# Patient Record
Sex: FEMALE | Race: WHITE | NOT HISPANIC OR LATINO | Employment: OTHER | URBAN - METROPOLITAN AREA
[De-identification: names, ages, dates, MRNs, and addresses within clinical notes are randomized per-mention and may not be internally consistent; named-entity substitution may affect disease eponyms.]

---

## 2017-08-07 ENCOUNTER — APPOINTMENT (OUTPATIENT)
Dept: WOMENS IMAGING | Facility: HOSPITAL | Age: 72
End: 2017-08-07

## 2017-08-07 PROCEDURE — G0202 SCR MAMMO BI INCL CAD: HCPCS | Performed by: RADIOLOGY

## 2017-08-07 PROCEDURE — 77063 BREAST TOMOSYNTHESIS BI: CPT | Performed by: RADIOLOGY

## 2017-08-07 PROCEDURE — MDREVIEWSP: Performed by: RADIOLOGY

## 2018-08-21 ENCOUNTER — APPOINTMENT (OUTPATIENT)
Dept: WOMENS IMAGING | Facility: HOSPITAL | Age: 73
End: 2018-08-21

## 2018-08-21 PROCEDURE — 77063 BREAST TOMOSYNTHESIS BI: CPT | Performed by: RADIOLOGY

## 2018-08-21 PROCEDURE — MDREVIEWSP: Performed by: RADIOLOGY

## 2018-08-21 PROCEDURE — 77067 SCR MAMMO BI INCL CAD: CPT | Performed by: RADIOLOGY

## 2019-06-19 RX ORDER — ATORVASTATIN CALCIUM 10 MG/1
TABLET, FILM COATED ORAL
Qty: 90 TABLET | Refills: 0 | Status: SHIPPED | OUTPATIENT
Start: 2019-06-19 | End: 2019-09-16 | Stop reason: SDUPTHER

## 2019-07-25 ENCOUNTER — TRANSCRIBE ORDERS (OUTPATIENT)
Dept: INTERNAL MEDICINE | Facility: CLINIC | Age: 74
End: 2019-07-25

## 2019-09-16 RX ORDER — ATORVASTATIN CALCIUM 10 MG/1
TABLET, FILM COATED ORAL
Qty: 90 TABLET | Refills: 0 | Status: SHIPPED | OUTPATIENT
Start: 2019-09-16 | End: 2019-12-11 | Stop reason: SDUPTHER

## 2019-10-10 ENCOUNTER — APPOINTMENT (OUTPATIENT)
Dept: WOMENS IMAGING | Facility: HOSPITAL | Age: 74
End: 2019-10-10

## 2019-10-10 PROCEDURE — 77063 BREAST TOMOSYNTHESIS BI: CPT | Performed by: RADIOLOGY

## 2019-10-10 PROCEDURE — 77067 SCR MAMMO BI INCL CAD: CPT | Performed by: RADIOLOGY

## 2019-10-10 PROCEDURE — MDREVIEWSP: Performed by: RADIOLOGY

## 2019-10-15 ENCOUNTER — TELEPHONE (OUTPATIENT)
Dept: INTERNAL MEDICINE | Facility: CLINIC | Age: 74
End: 2019-10-15

## 2019-10-15 RX ORDER — OMEPRAZOLE 40 MG/1
40 CAPSULE, DELAYED RELEASE ORAL DAILY
Qty: 30 CAPSULE | Refills: 1 | Status: SHIPPED | OUTPATIENT
Start: 2019-10-15 | End: 2019-10-15 | Stop reason: SDUPTHER

## 2019-10-15 RX ORDER — OMEPRAZOLE 40 MG/1
40 CAPSULE, DELAYED RELEASE ORAL DAILY
Qty: 90 CAPSULE | Refills: 1 | Status: SHIPPED | OUTPATIENT
Start: 2019-10-15 | End: 2020-12-10

## 2019-10-15 NOTE — TELEPHONE ENCOUNTER
Patient states she has severe heartburn and acid reflux and has never had anything like this before.  She has been taking Tums, and pepto bismol without relief.    She will be going out of town for 10 days and she would like a prescription like Nexium or Prilosec that is stronger than something over the counter.

## 2019-10-25 PROBLEM — E55.9 VITAMIN D DEFICIENCY: Status: ACTIVE | Noted: 2019-10-25

## 2019-10-25 PROBLEM — K12.0 APHTHOUS ULCER: Status: ACTIVE | Noted: 2019-10-25

## 2019-10-25 PROBLEM — R01.1 HEART MURMUR: Status: ACTIVE | Noted: 2019-10-25

## 2019-10-25 PROBLEM — E03.9 HYPOTHYROIDISM: Status: ACTIVE | Noted: 2019-10-25

## 2019-10-25 PROBLEM — I10 BENIGN ESSENTIAL HYPERTENSION: Status: ACTIVE | Noted: 2019-10-25

## 2019-10-25 PROBLEM — Z85.828 HISTORY OF SQUAMOUS CELL CARCINOMA OF SKIN: Status: ACTIVE | Noted: 2019-10-25

## 2019-10-25 PROBLEM — I73.9 PERIPHERAL VASCULAR DISEASE: Status: ACTIVE | Noted: 2019-10-25

## 2019-10-25 PROBLEM — R05.9 COUGH: Status: ACTIVE | Noted: 2019-10-25

## 2019-10-25 PROBLEM — R73.01 IMPAIRED FASTING GLUCOSE: Status: ACTIVE | Noted: 2019-10-25

## 2019-10-25 PROBLEM — S90.422A: Status: ACTIVE | Noted: 2019-10-25

## 2019-10-25 PROBLEM — Z00.00 MEDICARE ANNUAL WELLNESS VISIT, SUBSEQUENT: Status: ACTIVE | Noted: 2019-10-25

## 2019-10-25 PROBLEM — N64.4 NIPPLE PAIN: Status: ACTIVE | Noted: 2019-10-25

## 2019-10-25 PROBLEM — E78.5 HYPERLIPIDEMIA LDL GOAL <100: Status: ACTIVE | Noted: 2019-10-25

## 2019-10-30 RX ORDER — LISINOPRIL AND HYDROCHLOROTHIAZIDE 20; 12.5 MG/1; MG/1
TABLET ORAL
Qty: 90 TABLET | Refills: 0 | OUTPATIENT
Start: 2019-10-30

## 2019-10-30 RX ORDER — LISINOPRIL AND HYDROCHLOROTHIAZIDE 20; 12.5 MG/1; MG/1
TABLET ORAL
Qty: 30 TABLET | Refills: 0 | Status: SHIPPED | OUTPATIENT
Start: 2019-10-30 | End: 2019-11-26 | Stop reason: SDUPTHER

## 2019-11-13 ENCOUNTER — TELEPHONE (OUTPATIENT)
Dept: INTERNAL MEDICINE | Facility: CLINIC | Age: 74
End: 2019-11-13

## 2019-11-13 DIAGNOSIS — R73.01 IMPAIRED FASTING GLUCOSE: ICD-10-CM

## 2019-11-13 DIAGNOSIS — E78.5 HYPERLIPIDEMIA LDL GOAL <100: ICD-10-CM

## 2019-11-13 DIAGNOSIS — I10 BENIGN ESSENTIAL HYPERTENSION: Primary | ICD-10-CM

## 2019-11-13 DIAGNOSIS — E55.9 VITAMIN D DEFICIENCY: ICD-10-CM

## 2019-11-13 NOTE — TELEPHONE ENCOUNTER
Topher from [facility] called stating that patient wanted to know if she needs to make an appt to get her labs done   I informed her that she doesn't the orders just have to be put in and I will ask Dr. Roman to put the orders in and to also tell patient someone will give her a call when they are in     Needs orders put in

## 2019-11-13 NOTE — TELEPHONE ENCOUNTER
PT CALLED FOLLOW UP WITH LAB ORDERS SHE NEEDS TO GO TOMORROW TO HAVE THEM DRAWN SHE WILL BE OUT OF TOWN fRIDAY     PLEASE ADVISE AND GIVE PT  CALL BACK

## 2019-11-13 NOTE — TELEPHONE ENCOUNTER
She does need appt for her medciare wellness with me before the end of the year and when that is made let me know and I will put in order for her labs to ge and s tell her where she needs to go. Since she will do her labs before visit she can have afternoon appt

## 2019-11-14 ENCOUNTER — LAB (OUTPATIENT)
Dept: LAB | Facility: HOSPITAL | Age: 74
End: 2019-11-14

## 2019-11-14 DIAGNOSIS — R73.01 IMPAIRED FASTING GLUCOSE: ICD-10-CM

## 2019-11-14 DIAGNOSIS — E78.5 HYPERLIPIDEMIA LDL GOAL <100: ICD-10-CM

## 2019-11-14 DIAGNOSIS — I10 BENIGN ESSENTIAL HYPERTENSION: ICD-10-CM

## 2019-11-14 DIAGNOSIS — E55.9 VITAMIN D DEFICIENCY: ICD-10-CM

## 2019-11-14 LAB
25(OH)D3 SERPL-MCNC: 21.5 NG/ML (ref 30–100)
ALBUMIN SERPL-MCNC: 4.5 G/DL (ref 3.5–5.2)
ALBUMIN UR-MCNC: <1.2 MG/DL
ALBUMIN/GLOB SERPL: 1.5 G/DL
ALP SERPL-CCNC: 70 U/L (ref 39–117)
ALT SERPL W P-5'-P-CCNC: 12 U/L (ref 1–33)
ANION GAP SERPL CALCULATED.3IONS-SCNC: 14.5 MMOL/L (ref 5–15)
AST SERPL-CCNC: 21 U/L (ref 1–32)
BASOPHILS # BLD AUTO: 0.02 10*3/MM3 (ref 0–0.2)
BASOPHILS NFR BLD AUTO: 0.3 % (ref 0–1.5)
BILIRUB SERPL-MCNC: 0.5 MG/DL (ref 0.2–1.2)
BUN BLD-MCNC: 12 MG/DL (ref 8–23)
BUN/CREAT SERPL: 13.2 (ref 7–25)
CALCIUM SPEC-SCNC: 9.9 MG/DL (ref 8.6–10.5)
CHLORIDE SERPL-SCNC: 96 MMOL/L (ref 98–107)
CHOLEST SERPL-MCNC: 177 MG/DL (ref 0–200)
CO2 SERPL-SCNC: 26.5 MMOL/L (ref 22–29)
CREAT BLD-MCNC: 0.91 MG/DL (ref 0.57–1)
CREAT UR-MCNC: 143.9 MG/DL
DEPRECATED RDW RBC AUTO: 41.8 FL (ref 37–54)
EOSINOPHIL # BLD AUTO: 0.29 10*3/MM3 (ref 0–0.4)
EOSINOPHIL NFR BLD AUTO: 4.4 % (ref 0.3–6.2)
ERYTHROCYTE [DISTWIDTH] IN BLOOD BY AUTOMATED COUNT: 11.8 % (ref 12.3–15.4)
GFR SERPL CREATININE-BSD FRML MDRD: 61 ML/MIN/1.73
GLOBULIN UR ELPH-MCNC: 3.1 GM/DL
GLUCOSE BLD-MCNC: 93 MG/DL (ref 65–99)
HBA1C MFR BLD: 5.4 % (ref 4.8–5.6)
HCT VFR BLD AUTO: 37.1 % (ref 34–46.6)
HDLC SERPL-MCNC: 73 MG/DL (ref 40–60)
HGB BLD-MCNC: 12.7 G/DL (ref 12–15.9)
IMM GRANULOCYTES # BLD AUTO: 0.03 10*3/MM3 (ref 0–0.05)
IMM GRANULOCYTES NFR BLD AUTO: 0.5 % (ref 0–0.5)
LDLC SERPL CALC-MCNC: 85 MG/DL (ref 0–100)
LDLC/HDLC SERPL: 1.17 {RATIO}
LYMPHOCYTES # BLD AUTO: 1.72 10*3/MM3 (ref 0.7–3.1)
LYMPHOCYTES NFR BLD AUTO: 25.8 % (ref 19.6–45.3)
MCH RBC QN AUTO: 33.2 PG (ref 26.6–33)
MCHC RBC AUTO-ENTMCNC: 34.2 G/DL (ref 31.5–35.7)
MCV RBC AUTO: 96.9 FL (ref 79–97)
MICROALBUMIN/CREAT UR: NORMAL MG/G{CREAT}
MONOCYTES # BLD AUTO: 0.88 10*3/MM3 (ref 0.1–0.9)
MONOCYTES NFR BLD AUTO: 13.2 % (ref 5–12)
NEUTROPHILS # BLD AUTO: 3.72 10*3/MM3 (ref 1.7–7)
NEUTROPHILS NFR BLD AUTO: 55.8 % (ref 42.7–76)
NRBC BLD AUTO-RTO: 0 /100 WBC (ref 0–0.2)
PLATELET # BLD AUTO: 318 10*3/MM3 (ref 140–450)
PMV BLD AUTO: 9.4 FL (ref 6–12)
POTASSIUM BLD-SCNC: 4.4 MMOL/L (ref 3.5–5.2)
PROT SERPL-MCNC: 7.6 G/DL (ref 6–8.5)
RBC # BLD AUTO: 3.83 10*6/MM3 (ref 3.77–5.28)
SODIUM BLD-SCNC: 137 MMOL/L (ref 136–145)
T4 FREE SERPL-MCNC: 1.08 NG/DL (ref 0.93–1.7)
TRIGL SERPL-MCNC: 94 MG/DL (ref 0–150)
TSH SERPL DL<=0.05 MIU/L-ACNC: 6.74 UIU/ML (ref 0.27–4.2)
VLDLC SERPL-MCNC: 18.8 MG/DL (ref 5–40)
WBC NRBC COR # BLD: 6.66 10*3/MM3 (ref 3.4–10.8)

## 2019-11-14 PROCEDURE — 84439 ASSAY OF FREE THYROXINE: CPT

## 2019-11-14 PROCEDURE — 85025 COMPLETE CBC W/AUTO DIFF WBC: CPT

## 2019-11-14 PROCEDURE — 82306 VITAMIN D 25 HYDROXY: CPT

## 2019-11-14 PROCEDURE — 82043 UR ALBUMIN QUANTITATIVE: CPT

## 2019-11-14 PROCEDURE — 84443 ASSAY THYROID STIM HORMONE: CPT

## 2019-11-14 PROCEDURE — 80053 COMPREHEN METABOLIC PANEL: CPT

## 2019-11-14 PROCEDURE — 80061 LIPID PANEL: CPT

## 2019-11-14 PROCEDURE — 82570 ASSAY OF URINE CREATININE: CPT

## 2019-11-14 PROCEDURE — 83036 HEMOGLOBIN GLYCOSYLATED A1C: CPT

## 2019-11-19 ENCOUNTER — OFFICE VISIT (OUTPATIENT)
Dept: INTERNAL MEDICINE | Facility: CLINIC | Age: 74
End: 2019-11-19

## 2019-11-19 VITALS
DIASTOLIC BLOOD PRESSURE: 60 MMHG | HEIGHT: 68 IN | SYSTOLIC BLOOD PRESSURE: 122 MMHG | BODY MASS INDEX: 21.52 KG/M2 | WEIGHT: 142 LBS | HEART RATE: 80 BPM

## 2019-11-19 DIAGNOSIS — I10 BENIGN ESSENTIAL HYPERTENSION: ICD-10-CM

## 2019-11-19 DIAGNOSIS — R73.01 IMPAIRED FASTING GLUCOSE: ICD-10-CM

## 2019-11-19 DIAGNOSIS — E78.5 HYPERLIPIDEMIA LDL GOAL <100: ICD-10-CM

## 2019-11-19 DIAGNOSIS — I73.9 PERIPHERAL VASCULAR DISEASE (HCC): ICD-10-CM

## 2019-11-19 DIAGNOSIS — R42 DIZZY: ICD-10-CM

## 2019-11-19 DIAGNOSIS — Z23 NEED FOR VACCINATION: ICD-10-CM

## 2019-11-19 DIAGNOSIS — Z85.828 HISTORY OF SQUAMOUS CELL CARCINOMA OF SKIN: ICD-10-CM

## 2019-11-19 DIAGNOSIS — E55.9 VITAMIN D DEFICIENCY: ICD-10-CM

## 2019-11-19 DIAGNOSIS — L50.9 HIVES: ICD-10-CM

## 2019-11-19 DIAGNOSIS — Z00.00 MEDICARE ANNUAL WELLNESS VISIT, SUBSEQUENT: Primary | ICD-10-CM

## 2019-11-19 PROCEDURE — 93000 ELECTROCARDIOGRAM COMPLETE: CPT | Performed by: INTERNAL MEDICINE

## 2019-11-19 PROCEDURE — G0439 PPPS, SUBSEQ VISIT: HCPCS | Performed by: INTERNAL MEDICINE

## 2019-11-19 PROCEDURE — 99213 OFFICE O/P EST LOW 20 MIN: CPT | Performed by: INTERNAL MEDICINE

## 2019-11-19 RX ORDER — CETIRIZINE HYDROCHLORIDE 5 MG/1
5 TABLET ORAL DAILY
Qty: 30 TABLET | Refills: 11 | Status: SHIPPED | OUTPATIENT
Start: 2019-11-19 | End: 2020-12-10

## 2019-11-19 NOTE — PROGRESS NOTES
The ABCs of the Annual Wellness Visit  Subsequent Medicare Wellness Visit    Chief Complaint   Patient presents with   • Medicare Wellness-subsequent     Labs are done   • Hypertension   • Hyperlipidemia     HIVES and dizziness  Subjective   History of Present Illness:  Sandy Brownlee is a 73 y.o. female who presents for a Subsequent Medicare Wellness Visit.She has good hearing and declined hearing evaluation. She has good vision overall. She denies chest pain and her breathing is ok. She has good get up and go. Her clock was good. Her memory is good and her mood is good. She enjoys time with family. I have gone over her medications. Her colonoscopy was 2017 and will repeat in 2022 with Dr Hatch. Her mammogram was ok last month. She has some mild dizziness at times but has not passed out. She has acute issue of hives and itching. It is relieved some with benadryl.     HEALTH RISK ASSESSMENT    Recent Hospitalizations:  No hospitalization(s) within the last year.    Current Medical Providers:  Patient Care Team:  Sylvester Roman MD as PCP - General (Internal Medicine)    Smoking Status:  Social History     Tobacco Use   Smoking Status Never Smoker   Smokeless Tobacco Never Used       Alcohol Consumption:  Social History     Substance and Sexual Activity   Alcohol Use Yes   • Frequency: 4 or more times a week   • Drinks per session: 1 or 2   • Binge frequency: Never       Depression Screen:   PHQ-2/PHQ-9 Depression Screening 11/19/2019   Little interest or pleasure in doing things 0   Feeling down, depressed, or hopeless 0   Total Score 0       Fall Risk Screen:  FARHEEN Fall Risk Assessment was completed, and patient is at LOW risk for falls.Assessment completed on:11/19/2019    Health Habits and Functional and Cognitive Screening:  Functional & Cognitive Status 11/19/2019   Do you have difficulty preparing food and eating? No   Do you have difficulty bathing yourself, getting dressed or grooming yourself? No    Do you have difficulty using the toilet? No   Do you have difficulty moving around from place to place? No   Do you have trouble with steps or getting out of a bed or a chair? No   Current Diet Well Balanced Diet   Dental Exam Up to date   Eye Exam Up to date   Exercise (times per week) 4 times per week   Current Exercise Activities Include Light Weight/Kettebells   Do you need help using the phone?  No   Are you deaf or do you have serious difficulty hearing?  No   Do you need help with transportation? No   Do you need help shopping? No   Do you need help preparing meals?  No   Do you need help with housework?  No   Do you need help with laundry? No   Do you need help taking your medications? No   Do you need help managing money? No   Do you ever drive or ride in a car without wearing a seat belt? No   Have you felt unusual stress, anger or loneliness in the last month? No   Who do you live with? Spouse   If you need help, do you have trouble finding someone available to you? No   Have you been bothered in the last four weeks by sexual problems? No   Do you have difficulty concentrating, remembering or making decisions? No         Does the patient have evidence of cognitive impairment? No    Asprin use counseling:Does not need ASA (and currently is not on it)    Age-appropriate Screening Schedule:  Refer to the list below for future screening recommendations based on patient's age, sex and/or medical conditions. Orders for these recommended tests are listed in the plan section. The patient has been provided with a written plan.    Health Maintenance   Topic Date Due   • ZOSTER VACCINE (3 of 3) 10/15/2019   • LIPID PANEL  11/14/2020   • TDAP/TD VACCINES (2 - Td) 03/22/2021   • MAMMOGRAM  10/10/2021   • COLONOSCOPY  08/30/2027   • INFLUENZA VACCINE  Completed   • PNEUMOCOCCAL VACCINES (65+ LOW/MEDIUM RISK)  Completed          The following portions of the patient's history were reviewed and updated as appropriate:  allergies, current medications, past family history, past medical history, past social history, past surgical history and problem list.    Outpatient Medications Prior to Visit   Medication Sig Dispense Refill   • atorvastatin (LIPITOR) 10 MG tablet TAKE 1 TABLET BY MOUTH AT BEDTIME 90 tablet 0   • lisinopril-hydrochlorothiazide (PRINZIDE,ZESTORETIC) 20-12.5 MG per tablet TAKE 1 TABLET BY MOUTH EVERY DAY 30 tablet 0   • omeprazole (priLOSEC) 40 MG capsule TAKE 1 CAPSULE BY MOUTH DAILY 90 capsule 1     No facility-administered medications prior to visit.        Patient Active Problem List   Diagnosis   • Blister of great toe of left foot, initial encounter   • Aphthous ulcer   • Benign essential hypertension   • Cough   • Heart murmur   • History of squamous cell carcinoma of skin   • Hyperlipidemia LDL goal <100   • Hypothyroidism   • Impaired fasting glucose   • Medicare annual wellness visit, subsequent   • Nipple pain   • Peripheral vascular disease (CMS/HCC)   • Vitamin D deficiency       Advanced Care Planning:  Patient does not have an advance directive - information provided to the patient today    Review of Systems   Constitutional: Negative for chills, fatigue and fever.   HENT: Negative for congestion, ear pain, hearing loss and sinus pressure.    Eyes: Negative for visual disturbance.   Respiratory: Negative for cough, chest tightness, shortness of breath and wheezing.    Cardiovascular: Negative for chest pain and palpitations.   Gastrointestinal: Negative for abdominal pain, blood in stool and constipation.   Endocrine: Negative for polyuria.   Genitourinary: Negative for dysuria.   Skin: Negative for color change.   Allergic/Immunologic: Positive for environmental allergies.   Neurological: Negative for dizziness, speech difficulty and headaches.   Hematological: Negative for adenopathy.   Psychiatric/Behavioral: Negative for confusion and dysphoric mood. The patient is not nervous/anxious.   "      Compared to one year ago, the patient feels her physical health is the same.  Compared to one year ago, the patient feels her mental health is the same.    Reviewed chart for potential of high risk medication in the elderly: yes  Reviewed chart for potential of harmful drug interactions in the elderly:yes    Objective         Vitals:    11/19/19 1551   BP: 122/60   BP Location: Left arm   Patient Position: Sitting   Cuff Size: Adult   Pulse: 80   Weight: 64.4 kg (142 lb)   Height: 171.5 cm (67.5\")   PainSc: 0-No pain       Body mass index is 21.91 kg/m².  Discussed the patient's BMI with her. The BMI is in the acceptable range.    Physical Exam   Constitutional: She is oriented to person, place, and time. She appears well-developed and well-nourished.   HENT:   Head: Normocephalic.   Right Ear: External ear normal.   Left Ear: External ear normal.   Mouth/Throat: Oropharynx is clear and moist.   Eyes: Conjunctivae and EOM are normal.   Neck: No JVD present.   Cardiovascular: Normal rate, regular rhythm and normal heart sounds.   No murmur heard.  Bilateral carotids ok   Pulmonary/Chest: Effort normal and breath sounds normal. No respiratory distress.   Abdominal: Soft. Bowel sounds are normal. There is no tenderness.   Musculoskeletal: She exhibits no edema.   Lymphadenopathy:     She has no cervical adenopathy.   Neurological: She is alert and oriented to person, place, and time.   Good get up and go and good recall 3 of 3 and good clock and low TUG   Skin: Skin is warm and dry.   Psychiatric: She has a normal mood and affect. Her behavior is normal.   Nursing note and vitals reviewed.      Lab Results   Component Value Date    TRIG 94 11/14/2019    HDL 73 (H) 11/14/2019    LDL 85 11/14/2019    VLDL 18.8 11/14/2019    HGBA1C 5.40 11/14/2019      ECG 12 Lead  Date/Time: 11/19/2019 5:00 PM  Performed by: Sylvester Roman MD  Authorized by: Sylvester Roman MD   Comparison: compared with previous ECG from " 1/2/2018  Similar to previous ECG  Rhythm: sinus rhythm  Rate: normal  QRS axis: normal    Clinical impression: normal ECG          Assessment/Plan   Medicare Risks and Personalized Health Plan  CMS Preventative Services Quick Reference  Advance Directive Discussion  Cardiovascular risk  Diabetic Lab Screening   Fall Risk  Glaucoma Risk  Hearing Problem  Polypharmacy    The above risks/problems have been discussed with the patient.  Pertinent information has been shared with the patient in the After Visit Summary.  Follow up plans and orders are seen below in the Assessment/Plan Section.    Diagnoses and all orders for this visit:    1. Medicare annual wellness visit, subsequent (Primary)  Assessment & Plan:  She has good hearing and declined hearing evaluation. She has good vision overall. She denies chest pain and her breathing is ok. She has good get up and go. Her clock was good. Her memory is good and her mood is good. She enjoys time with family. I have gone over her medications. Her colonoscopy was 2017 and will repeat in 2022 with Dr Hatch. Her mammogram was ok last month. She has some mild dizziness at times but has not passed out. She has acute issue of hives and itching. It is relieved some with benadryl. Age-appropriate Counseling:  Discussed preventative medicine issues with patient including regular exercise, healthy diet, stress reduction, adequate sleep and recommended age-appropriate screening studies.  Immunizations reviewed.           2. Benign essential hypertension  Assessment & Plan:  Hypertension is improving with treatment.  Continue current treatment regimen.  Dietary sodium restriction.  Regular aerobic exercise.  Blood pressure will be reassessed at the next regular appointment.    Orders:  -     ECG 12 Lead  -     Duplex Carotid Ultrasound CAR; Future    3. Hives  Comments:  Acute issue and will add zyrtec and see Dr Pavon.   Orders:  -     cetirizine (zyrTEC) 5 MG tablet; Take 1 tablet by  mouth Daily.  Dispense: 30 tablet; Refill: 11    4. Vitamin D deficiency  Assessment & Plan:  Under replaced. Add 1000 IU of vitamin D 3 daily.       5. Impaired fasting glucose  Assessment & Plan:  Discussed decreasing bad carbohydrates, specifically sweets, breads, potatoes, corn and high caloric drinks (juices, sodas, sweet tea).  Also recommend increasing physical activity, ideally 150 minutes aerobic exercise weekly and resistance exercises 2-3x/week.      6. Hyperlipidemia LDL goal <100  Assessment & Plan:  Lipid abnormalities are unchanged.  Nutritional counseling was provided. and Pharmacotherapy as ordered.  Lipids will be reassessed in 1 year.    Orders:  -     Duplex Carotid Ultrasound CAR; Future    7. Peripheral vascular disease (CMS/HCC)  Assessment & Plan:  She gets regular exercise and denies claudication and take atorvastatin and proceed with carotid ultrasound.     Orders:  -     Duplex Carotid Ultrasound CAR; Future    8. Dizzy  Comments:  Acute issue and will get carotid ultrasound.   Orders:  -     Duplex Carotid Ultrasound CAR; Future    9. Need for vaccination  -     Cancel: Pneumococcal Polysaccharide Vaccine 23-Valent Greater Than or Equal To 3yo Subcutaneous / IM    10. History of squamous cell carcinoma of skin  Assessment & Plan:  Follow with Dr Sirisha Partida yearly.       Follow Up:  Return in about 1 year (around 11/19/2020) for Medicare Wellness.     An After Visit Summary and PPPS were given to the patient.

## 2019-11-20 NOTE — ASSESSMENT & PLAN NOTE
Hypertension is improving with treatment.  Continue current treatment regimen.  Dietary sodium restriction.  Regular aerobic exercise.  Blood pressure will be reassessed at the next regular appointment.

## 2019-11-20 NOTE — ASSESSMENT & PLAN NOTE
She has good hearing and declined hearing evaluation. She has good vision overall. She denies chest pain and her breathing is ok. She has good get up and go. Her clock was good. Her memory is good and her mood is good. She enjoys time with family. I have gone over her medications. Her colonoscopy was 2017 and will repeat in 2022 with Dr Hatch. Her mammogram was ok last month. She has some mild dizziness at times but has not passed out. She has acute issue of hives and itching. It is relieved some with benadryl. Age-appropriate Counseling:  Discussed preventative medicine issues with patient including regular exercise, healthy diet, stress reduction, adequate sleep and recommended age-appropriate screening studies.  Immunizations reviewed.

## 2019-11-20 NOTE — ASSESSMENT & PLAN NOTE
She gets regular exercise and denies claudication and take atorvastatin and proceed with carotid ultrasound.

## 2019-11-26 ENCOUNTER — HOSPITAL ENCOUNTER (OUTPATIENT)
Dept: CARDIOLOGY | Facility: HOSPITAL | Age: 74
Discharge: HOME | End: 2019-11-26
Admitting: INTERNAL MEDICINE

## 2019-11-26 ENCOUNTER — OFFICE (OUTPATIENT)
Dept: URBAN - METROPOLITAN AREA CLINIC 4 | Facility: CLINIC | Age: 74
End: 2019-11-26
Payer: COMMERCIAL

## 2019-11-26 VITALS — WEIGHT: 142 LBS | BODY MASS INDEX: 22.29 KG/M2 | HEIGHT: 67 IN

## 2019-11-26 VITALS — SYSTOLIC BLOOD PRESSURE: 146 MMHG | WEIGHT: 141 LBS | DIASTOLIC BLOOD PRESSURE: 75 MMHG | HEIGHT: 69 IN

## 2019-11-26 DIAGNOSIS — I73.9 PERIPHERAL VASCULAR DISEASE (HCC): ICD-10-CM

## 2019-11-26 DIAGNOSIS — L28.2 OTHER PRURIGO: ICD-10-CM

## 2019-11-26 DIAGNOSIS — K21.9 GASTRO-ESOPHAGEAL REFLUX DISEASE WITHOUT ESOPHAGITIS: ICD-10-CM

## 2019-11-26 DIAGNOSIS — R10.31 RIGHT LOWER QUADRANT PAIN: ICD-10-CM

## 2019-11-26 DIAGNOSIS — R42 DIZZY: ICD-10-CM

## 2019-11-26 DIAGNOSIS — R19.4 CHANGE IN BOWEL HABIT: ICD-10-CM

## 2019-11-26 DIAGNOSIS — K59.00 CONSTIPATION, UNSPECIFIED: ICD-10-CM

## 2019-11-26 DIAGNOSIS — E78.5 HYPERLIPIDEMIA LDL GOAL <100: ICD-10-CM

## 2019-11-26 DIAGNOSIS — I10 BENIGN ESSENTIAL HYPERTENSION: ICD-10-CM

## 2019-11-26 LAB
BH CV ECHO MEAS - BSA(HAYCOCK): 1.7 M^2
BH CV ECHO MEAS - BSA: 1.7 M^2
BH CV ECHO MEAS - BZI_BMI: 22.2 KILOGRAMS/M^2
BH CV ECHO MEAS - BZI_METRIC_HEIGHT: 170.2 CM
BH CV ECHO MEAS - BZI_METRIC_WEIGHT: 64.4 KG
BH CV XLRA MEAS LEFT CCA RATIO VEL: 69.1 CM/SEC
BH CV XLRA MEAS LEFT DIST CCA EDV: 17.6 CM/SEC
BH CV XLRA MEAS LEFT DIST CCA PSV: 69.8 CM/SEC
BH CV XLRA MEAS LEFT DIST ICA EDV: 32.1 CM/SEC
BH CV XLRA MEAS LEFT DIST ICA PSV: 99.2 CM/SEC
BH CV XLRA MEAS LEFT ICA RATIO VEL: 73.9 CM/SEC
BH CV XLRA MEAS LEFT ICA/CCA RATIO: 1.1
BH CV XLRA MEAS LEFT MID CCA EDV: 15.7 CM/SEC
BH CV XLRA MEAS LEFT MID CCA PSV: 66.6 CM/SEC
BH CV XLRA MEAS LEFT MID ICA EDV: 24 CM/SEC
BH CV XLRA MEAS LEFT MID ICA PSV: 74.3 CM/SEC
BH CV XLRA MEAS LEFT PROX CCA EDV: 20.1 CM/SEC
BH CV XLRA MEAS LEFT PROX CCA PSV: 96.8 CM/SEC
BH CV XLRA MEAS LEFT PROX ECA PSV: 63.5 CM/SEC
BH CV XLRA MEAS LEFT PROX ICA EDV: 14.5 CM/SEC
BH CV XLRA MEAS LEFT PROX ICA PSV: 46.4 CM/SEC
BH CV XLRA MEAS LEFT PROX SCLA PSV: 204.3 CM/SEC
BH CV XLRA MEAS LEFT VERTEBRAL A EDV: 13.3 CM/SEC
BH CV XLRA MEAS LEFT VERTEBRAL A PSV: 61.5 CM/SEC
BH CV XLRA MEAS RIGHT CCA RATIO VEL: 65.4 CM/SEC
BH CV XLRA MEAS RIGHT DIST CCA EDV: 17.6 CM/SEC
BH CV XLRA MEAS RIGHT DIST CCA PSV: 66 CM/SEC
BH CV XLRA MEAS RIGHT DIST ICA EDV: 23.9 CM/SEC
BH CV XLRA MEAS RIGHT DIST ICA PSV: 80.5 CM/SEC
BH CV XLRA MEAS RIGHT ICA RATIO VEL: 61.6 CM/SEC
BH CV XLRA MEAS RIGHT ICA/CCA RATIO: 0.94
BH CV XLRA MEAS RIGHT MID CCA EDV: 21.2 CM/SEC
BH CV XLRA MEAS RIGHT MID CCA PSV: 80.1 CM/SEC
BH CV XLRA MEAS RIGHT MID ICA EDV: 23.9 CM/SEC
BH CV XLRA MEAS RIGHT MID ICA PSV: 62.2 CM/SEC
BH CV XLRA MEAS RIGHT PROX CCA EDV: 14.1 CM/SEC
BH CV XLRA MEAS RIGHT PROX CCA PSV: 88 CM/SEC
BH CV XLRA MEAS RIGHT PROX ECA PSV: 71 CM/SEC
BH CV XLRA MEAS RIGHT PROX ICA EDV: 19.5 CM/SEC
BH CV XLRA MEAS RIGHT PROX ICA PSV: 68.5 CM/SEC
BH CV XLRA MEAS RIGHT PROX SCLA PSV: 180.7 CM/SEC
BH CV XLRA MEAS RIGHT VERTEBRAL A EDV: 17.6 CM/SEC
BH CV XLRA MEAS RIGHT VERTEBRAL A PSV: 86.7 CM/SEC
LEFT ARM BP: NORMAL MMHG
RIGHT ARM BP: NORMAL MMHG

## 2019-11-26 PROCEDURE — 93880 EXTRACRANIAL BILAT STUDY: CPT | Performed by: INTERNAL MEDICINE

## 2019-11-26 PROCEDURE — 99213 OFFICE O/P EST LOW 20 MIN: CPT | Performed by: NURSE PRACTITIONER

## 2019-11-26 PROCEDURE — 93880 EXTRACRANIAL BILAT STUDY: CPT

## 2019-11-26 RX ORDER — PANTOPRAZOLE SODIUM 20 MG/1
20 TABLET, DELAYED RELEASE ORAL
Qty: 30 | Refills: 5 | Status: ACTIVE
Start: 2019-11-26

## 2019-11-26 RX ORDER — LISINOPRIL AND HYDROCHLOROTHIAZIDE 20; 12.5 MG/1; MG/1
TABLET ORAL
Qty: 30 TABLET | Refills: 5 | Status: SHIPPED | OUTPATIENT
Start: 2019-11-26 | End: 2020-05-26

## 2019-12-11 RX ORDER — ATORVASTATIN CALCIUM 10 MG/1
TABLET, FILM COATED ORAL
Qty: 90 TABLET | Refills: 3 | Status: SHIPPED | OUTPATIENT
Start: 2019-12-11 | End: 2020-11-30

## 2020-03-04 RX ORDER — OSELTAMIVIR PHOSPHATE 75 MG/1
75 CAPSULE ORAL DAILY
Qty: 10 CAPSULE | Refills: 0 | Status: SHIPPED | OUTPATIENT
Start: 2020-03-04 | End: 2020-12-10

## 2020-03-04 NOTE — TELEPHONE ENCOUNTER
PATIENT HAS CALLED TO REQUEST TAMIFLU TO BE SENT TO HER PHARMACY. SHE STATES SHE WANTED TO GET IT AS A PRECAUTION. HER SYMPTOMS INCLUDE HEADACHE.   SHE WOULD LIKE IT SENT TO THE [facility] ON [address]    [phone]

## 2020-03-04 NOTE — TELEPHONE ENCOUNTER
If she thinks she has the flu, she should come in and be tested.  We prescribe tamiflu as a precaution when someone has had close contact with someone with the flu, but not for what she describes.

## 2020-03-04 NOTE — TELEPHONE ENCOUNTER
Pt is in [place] and states she had dinner with a friend who tested positive and wants it as precaution

## 2020-03-18 ENCOUNTER — TELEPHONE (OUTPATIENT)
Dept: INTERNAL MEDICINE | Facility: CLINIC | Age: 75
End: 2020-03-18

## 2020-03-18 RX ORDER — AMLODIPINE BESYLATE 5 MG/1
5 TABLET ORAL DAILY
Qty: 30 TABLET | Refills: 5 | Status: SHIPPED | OUTPATIENT
Start: 2020-03-18 | End: 2020-12-10

## 2020-03-18 NOTE — TELEPHONE ENCOUNTER
PT CALLED AND STATED THAT SHE WAS ADVISED BY ANOTHER MD THAT IF SHE WAS TO GET COVID19 THAT THE FOLLOWING MEDICATION lisinopril-hydrochlorothiazide (PRINZIDE,ZESTORETIC) 20-12.5 MG per tablet  COULD INCREASE THE SYMPTOMS  COVID19.  PATIENT WOULD LIKE FOR DR ALBARADO TO REVIEW AND RECOMMEND ANOTHER BP    PT CALL BACK [phone]    WINNIE VARGHESE - CONFIRMED

## 2020-03-18 NOTE — TELEPHONE ENCOUNTER
I will switch to amlodipine 5 mg but does not have some of the same advantages for heart and kidney protection so in a few months recommend switching back to lisinopril hctz

## 2020-05-26 RX ORDER — LISINOPRIL AND HYDROCHLOROTHIAZIDE 20; 12.5 MG/1; MG/1
TABLET ORAL
Qty: 30 TABLET | Refills: 5 | Status: SHIPPED | OUTPATIENT
Start: 2020-05-26 | End: 2020-10-20

## 2020-06-11 ENCOUNTER — TELEPHONE (OUTPATIENT)
Dept: INTERNAL MEDICINE | Facility: CLINIC | Age: 75
End: 2020-06-11

## 2020-06-11 NOTE — TELEPHONE ENCOUNTER
PT CALLED AND SAID SHE THINKS SHE HAS A BLADDER INFECTION; ONLY REPORTING BURNING DURING URINATION; PT WOULD LIKE SOMETHING CALLED INTO PHARMACY    [place]:     [address]

## 2020-10-05 ENCOUNTER — TELEPHONE (OUTPATIENT)
Dept: INTERNAL MEDICINE | Facility: CLINIC | Age: 75
End: 2020-10-05

## 2020-10-05 DIAGNOSIS — E78.5 HYPERLIPIDEMIA LDL GOAL <100: ICD-10-CM

## 2020-10-05 DIAGNOSIS — E55.9 VITAMIN D DEFICIENCY: ICD-10-CM

## 2020-10-05 DIAGNOSIS — I10 BENIGN ESSENTIAL HYPERTENSION: ICD-10-CM

## 2020-10-05 DIAGNOSIS — Z11.59 ENCOUNTER FOR HEPATITIS C SCREENING TEST FOR LOW RISK PATIENT: Primary | ICD-10-CM

## 2020-10-05 DIAGNOSIS — R73.01 IMPAIRED FASTING GLUCOSE: ICD-10-CM

## 2020-10-05 NOTE — TELEPHONE ENCOUNTER
PT WOULD LIKE TO HAVE LABS DOWN BEFORE HER WELLNESS VISIT  SCHEDULED IN December.    PLEASE ADVISE.  CALL BACK:[phone]

## 2020-10-20 RX ORDER — LISINOPRIL AND HYDROCHLOROTHIAZIDE 20; 12.5 MG/1; MG/1
TABLET ORAL
Qty: 30 TABLET | Refills: 5 | Status: SHIPPED | OUTPATIENT
Start: 2020-10-20 | End: 2021-12-01

## 2020-11-30 RX ORDER — ATORVASTATIN CALCIUM 10 MG/1
TABLET, FILM COATED ORAL
Qty: 90 TABLET | Refills: 0 | Status: SHIPPED | OUTPATIENT
Start: 2020-11-30 | End: 2021-03-08

## 2020-12-10 ENCOUNTER — OFFICE VISIT (OUTPATIENT)
Dept: INTERNAL MEDICINE | Facility: CLINIC | Age: 75
End: 2020-12-10

## 2020-12-10 VITALS
DIASTOLIC BLOOD PRESSURE: 62 MMHG | WEIGHT: 131 LBS | HEIGHT: 67 IN | HEART RATE: 79 BPM | TEMPERATURE: 97.1 F | BODY MASS INDEX: 20.56 KG/M2 | SYSTOLIC BLOOD PRESSURE: 128 MMHG

## 2020-12-10 DIAGNOSIS — I73.9 PERIPHERAL VASCULAR DISEASE (HCC): ICD-10-CM

## 2020-12-10 DIAGNOSIS — E78.5 HYPERLIPIDEMIA LDL GOAL <100: ICD-10-CM

## 2020-12-10 DIAGNOSIS — Z00.00 MEDICARE ANNUAL WELLNESS VISIT, SUBSEQUENT: Primary | ICD-10-CM

## 2020-12-10 DIAGNOSIS — I10 BENIGN ESSENTIAL HYPERTENSION: ICD-10-CM

## 2020-12-10 DIAGNOSIS — R73.01 IMPAIRED FASTING GLUCOSE: ICD-10-CM

## 2020-12-10 PROCEDURE — 93000 ELECTROCARDIOGRAM COMPLETE: CPT | Performed by: INTERNAL MEDICINE

## 2020-12-10 PROCEDURE — G0439 PPPS, SUBSEQ VISIT: HCPCS | Performed by: INTERNAL MEDICINE

## 2020-12-10 RX ORDER — VITAMIN B COMPLEX
CAPSULE ORAL DAILY
COMMUNITY
End: 2021-05-27

## 2020-12-10 RX ORDER — CHOLECALCIFEROL (VITAMIN D3) 125 MCG
500 CAPSULE ORAL DAILY
COMMUNITY
End: 2021-05-27

## 2020-12-10 RX ORDER — OMEGA-3S/DHA/EPA/FISH OIL/D3 300MG-1000
400 CAPSULE ORAL DAILY
COMMUNITY
End: 2021-04-30

## 2020-12-10 RX ORDER — ASPIRIN 81 MG/1
81 TABLET ORAL DAILY
Start: 2020-12-10

## 2020-12-10 NOTE — PROGRESS NOTES
QUICK REFERENCE INFORMATION:  The ABCs of the Annual Wellness Visit    Subsequent Medicare Wellness Visit    HEALTH RISK ASSESSMENT    1945    Recent Hospitalizations:  No hospitalization(s) within the last year..        Current Medical Providers:  Patient Care Team:  Sylvester Roman MD as PCP - General (Internal Medicine)  Sirisha Partida MD as Consulting Physician (Dermatology)  Daniel Pascal MD as Consulting Physician (Ophthalmology)        Smoking Status:  Social History     Tobacco Use   Smoking Status Never Smoker   Smokeless Tobacco Never Used       Alcohol Consumption:  Social History     Substance and Sexual Activity   Alcohol Use Yes   • Frequency: 4 or more times a week   • Drinks per session: 1 or 2   • Binge frequency: Never       Depression Screen:   PHQ-2/PHQ-9 Depression Screening 12/10/2020   Little interest or pleasure in doing things 0   Feeling down, depressed, or hopeless 0   Total Score 0       Health Habits and Functional and Cognitive Screening:  Functional & Cognitive Status 12/10/2020   Do you have difficulty preparing food and eating? No   Do you have difficulty bathing yourself, getting dressed or grooming yourself? No   Do you have difficulty using the toilet? No   Do you have difficulty moving around from place to place? No   Do you have trouble with steps or getting out of a bed or a chair? No   Current Diet Well Balanced Diet   Dental Exam Up to date   Eye Exam Up to date   Exercise (times per week) 7 times per week   Current Exercise Activities Include Walking   Do you need help using the phone?  No   Are you deaf or do you have serious difficulty hearing?  No   Do you need help with transportation? No   Do you need help shopping? No   Do you need help preparing meals?  No   Do you need help with housework?  No   Do you need help with laundry? No   Do you need help taking your medications? No   Do you need help managing money? No   Do you ever drive or ride in a  car without wearing a seat belt? Yes   Have you felt unusual stress, anger or loneliness in the last month? No   Who do you live with? Spouse   If you need help, do you have trouble finding someone available to you? No   Have you been bothered in the last four weeks by sexual problems? No   Do you have difficulty concentrating, remembering or making decisions? No       Fall Risk Screen:  FARHEEN Fall Risk Assessment was completed, and patient is at LOW risk for falls.Assessment completed on:12/10/2020    ACE III MINI        Does the patient have evidence of cognitive impairment? Yes    Aspirin use counseling: Taking ASA appropriately as indicated    Recent Lab Results:  CMP:  Lab Results   Component Value Date    BUN 12 11/14/2019    CREATININE 0.91 11/14/2019    EGFRIFNONA 61 11/14/2019    BCR 13.2 11/14/2019     11/14/2019    K 4.4 11/14/2019    CO2 26.5 11/14/2019    CALCIUM 9.9 11/14/2019    ALBUMIN 4.50 11/14/2019    BILITOT 0.5 11/14/2019    ALKPHOS 70 11/14/2019    AST 21 11/14/2019    ALT 12 11/14/2019     HbA1c:  Lab Results   Component Value Date    HGBA1C 5.40 11/14/2019     Microalbumin:  Lab Results   Component Value Date    MICROALBUR <1.2 11/14/2019     Lipid Panel  Lab Results   Component Value Date    CHOL 177 11/14/2019    TRIG 94 11/14/2019    HDL 73 (H) 11/14/2019    LDL 85 11/14/2019    AST 21 11/14/2019    ALT 12 11/14/2019       Visual Acuity:  No exam data present    Age-appropriate Screening Schedule:  Refer to the list below for future screening recommendations based on patient's age, sex and/or medical conditions. Orders for these recommended tests are listed in the plan section. The patient has been provided with a written plan.    Health Maintenance   Topic Date Due   • ZOSTER VACCINE (3 of 3) 10/15/2019   • LIPID PANEL  11/14/2020   • TDAP/TD VACCINES (2 - Td) 03/22/2021   • MAMMOGRAM  11/11/2022   • COLONOSCOPY  08/30/2027   • INFLUENZA VACCINE  Completed        Subjective    History of Present Illness    Sandy Brownlee is a 74 y.o. female who presents for a Subsequent Wellness Visit.She denies chest pain or shortness of air.     CHRONIC CONDITIONS    The following portions of the patient's history were reviewed and updated as appropriate: allergies, current medications, past family history, past medical history, past social history, past surgical history and problem list.    Outpatient Medications Prior to Visit   Medication Sig Dispense Refill   • atorvastatin (LIPITOR) 10 MG tablet TAKE 1 TABLET BY MOUTH EVERY NIGHT AT BEDTIME 90 tablet 0   • B Complex Vitamins (vitamin b complex) capsule capsule Take  by mouth Daily.     • cholecalciferol (VITAMIN D3) 10 MCG (400 UNIT) tablet Take 400 Units by mouth Daily.     • lisinopril-hydrochlorothiazide (PRINZIDE,ZESTORETIC) 20-12.5 MG per tablet TAKE 1 TABLET BY MOUTH DAILY 30 tablet 5   • magnesium oxide (MAGOX) 400 (241.3 Mg) MG tablet tablet Take 400 mg by mouth Daily.     • vitamin B-12 (CYANOCOBALAMIN) 500 MCG tablet Take 500 mcg by mouth Daily.     • amLODIPine (NORVASC) 5 MG tablet Take 1 tablet by mouth Daily. 30 tablet 5   • cetirizine (zyrTEC) 5 MG tablet Take 1 tablet by mouth Daily. 30 tablet 11   • omeprazole (priLOSEC) 40 MG capsule TAKE 1 CAPSULE BY MOUTH DAILY 90 capsule 1   • oseltamivir (TAMIFLU) 75 MG capsule Take 1 capsule by mouth Daily. 10 capsule 0     No facility-administered medications prior to visit.        Patient Active Problem List   Diagnosis   • Blister of great toe of left foot, initial encounter   • Aphthous ulcer   • Benign essential hypertension   • Cough   • Heart murmur   • History of squamous cell carcinoma of skin   • Hyperlipidemia LDL goal <100   • Hypothyroidism   • Impaired fasting glucose   • Medicare annual wellness visit, subsequent   • Nipple pain   • Peripheral vascular disease (CMS/HCC)   • Vitamin D deficiency       Advance Care Planning:  ACP discussion was held with the patient during  this visit. Patient has an advance directive in EMR which is still valid.     Identification of Risk Factors:  Risk factors include: Advance Directive Discussion  Cardiovascular risk  Fall Risk  Osteoprorosis Risk  Polypharmacy.    Review of Systems   Constitutional: Negative for chills and fever.   HENT: Negative for hearing loss, postnasal drip and sore throat.    Respiratory: Negative for cough and shortness of breath.    Cardiovascular: Negative for chest pain and palpitations.   Gastrointestinal: Negative for abdominal pain, nausea and vomiting.   Musculoskeletal: Negative for back pain and gait problem.   Skin: Negative for rash.   Neurological: Negative for dizziness, weakness, light-headedness and headaches.   Hematological: Negative for adenopathy.   Psychiatric/Behavioral: Negative for dysphoric mood and sleep disturbance. The patient is not nervous/anxious.    All other systems reviewed and are negative.      Compared to one year ago, the patient feels her physical health is the same.  Compared to one year ago, the patient feels her mental health is the same.    Objective     Physical Exam  Vitals signs and nursing note reviewed.   Constitutional:       Appearance: Normal appearance. She is well-developed.   HENT:      Head: Normocephalic.      Right Ear: Tympanic membrane and ear canal normal.      Left Ear: Tympanic membrane and ear canal normal.   Eyes:      Extraocular Movements: Extraocular movements intact.      Conjunctiva/sclera: Conjunctivae normal.   Cardiovascular:      Rate and Rhythm: Normal rate and regular rhythm.      Heart sounds: Normal heart sounds.   Pulmonary:      Effort: Pulmonary effort is normal. No respiratory distress.      Breath sounds: Normal breath sounds.   Abdominal:      General: Bowel sounds are normal.      Palpations: Abdomen is soft.      Tenderness: There is no abdominal tenderness.   Musculoskeletal:         General: No swelling.   Skin:     General: Skin is warm  "and dry.   Neurological:      General: No focal deficit present.      Mental Status: She is alert and oriented to person, place, and time.      Comments: Good get up and go and good clock and good recall 3 of 3   Psychiatric:         Mood and Affect: Mood normal.         Behavior: Behavior normal.            ECG 12 Lead    Date/Time: 12/10/2020 11:19 AM  Performed by: Sylvester Roman MD  Authorized by: Sylvester Roman MD   Comparison: compared with previous ECG from 11/25/2019  Similar to previous ECG  Rhythm: sinus rhythm  Rate: normal  QRS axis: normal    Clinical impression: normal ECG             Vitals:    12/10/20 1021   BP: 128/62   Pulse: 79   Temp: 97.1 °F (36.2 °C)   Weight: 59.4 kg (131 lb)   Height: 170.2 cm (67.01\")   PainSc: 0-No pain       Patient's Body mass index is 20.51 kg/m². BMI is within normal parameters. No follow-up required..      Assessment/Plan   Problem List Items Addressed This Visit        Cardiovascular and Mediastinum    Benign essential hypertension    Current Assessment & Plan     Hypertension is improving with treatment.  Continue current treatment regimen.  Regular aerobic exercise.  Blood pressure will be reassessed at the next regular appointment.         Relevant Medications    lisinopril-hydrochlorothiazide (PRINZIDE,ZESTORETIC) 20-12.5 MG per tablet    Other Relevant Orders    ECG 12 Lead    Hyperlipidemia LDL goal <100    Current Assessment & Plan     Lipid abnormalities are improving with treatment.  Nutritional counseling was provided. and Pharmacotherapy as ordered.  Lipids will be reassessed in 1 year.         Relevant Medications    atorvastatin (LIPITOR) 10 MG tablet    Peripheral vascular disease (CMS/HCC)    Current Assessment & Plan     Doing ok and with exercise.             Endocrine    Impaired fasting glucose    Current Assessment & Plan     Discussed decreasing bad carbohydrates, specifically sweets, breads, potatoes, corn and high caloric drinks " (juices, sodas, sweet tea).  Also recommend increasing physical activity, ideally 150 minutes aerobic exercise weekly and resistance exercises 2-3x/week.            Other    Medicare annual wellness visit, subsequent - Primary    Current Assessment & Plan     She has good get up and go and good hearing. Her vision is ok. She is doing ok with medications and her memory is good with 3 of 3 recall and good clock. She saw Dr Partida for skin exam and ok. Her mood is good overall. Age-appropriate Counseling:  Discussed preventative medicine issues with patient including regular exercise, healthy diet, stress reduction, adequate sleep and recommended age-appropriate screening studies.  Immunizations reviewed.                  Patient Self-Management and Personalized Health Advice  The patient has been provided with information about: diet, exercise, weight management, prevention of cardiac or vascular disease and fall prevention and preventive services including:   · Annual Wellness Visit (AWV).    Outpatient Encounter Medications as of 12/10/2020   Medication Sig Dispense Refill   • atorvastatin (LIPITOR) 10 MG tablet TAKE 1 TABLET BY MOUTH EVERY NIGHT AT BEDTIME 90 tablet 0   • B Complex Vitamins (vitamin b complex) capsule capsule Take  by mouth Daily.     • cholecalciferol (VITAMIN D3) 10 MCG (400 UNIT) tablet Take 400 Units by mouth Daily.     • lisinopril-hydrochlorothiazide (PRINZIDE,ZESTORETIC) 20-12.5 MG per tablet TAKE 1 TABLET BY MOUTH DAILY 30 tablet 5   • magnesium oxide (MAGOX) 400 (241.3 Mg) MG tablet tablet Take 400 mg by mouth Daily.     • vitamin B-12 (CYANOCOBALAMIN) 500 MCG tablet Take 500 mcg by mouth Daily.     • aspirin (aspirin) 81 MG EC tablet Take 1 tablet by mouth Daily.     • [DISCONTINUED] amLODIPine (NORVASC) 5 MG tablet Take 1 tablet by mouth Daily. 30 tablet 5   • [DISCONTINUED] cetirizine (zyrTEC) 5 MG tablet Take 1 tablet by mouth Daily. 30 tablet 11   • [DISCONTINUED] omeprazole  (priLOSEC) 40 MG capsule TAKE 1 CAPSULE BY MOUTH DAILY 90 capsule 1   • [DISCONTINUED] oseltamivir (TAMIFLU) 75 MG capsule Take 1 capsule by mouth Daily. 10 capsule 0     No facility-administered encounter medications on file as of 12/10/2020.        Reviewed use of high risk medication in the elderly: yes  Reviewed for potential of harmful drug interactions in the elderly: yes    Follow Up:  Return in about 1 year (around 12/10/2021) for Medicare Wellness.     There are no Patient Instructions on file for this visit.    An After Visit Summary and PPPS with all of these plans were given to the patient.

## 2020-12-11 NOTE — ASSESSMENT & PLAN NOTE
She has good get up and go and good hearing. Her vision is ok. She is doing ok with medications and her memory is good with 3 of 3 recall and good clock. She saw Dr Partida for skin exam and ok. Her mood is good overall. Age-appropriate Counseling:  Discussed preventative medicine issues with patient including regular exercise, healthy diet, stress reduction, adequate sleep and recommended age-appropriate screening studies.  Immunizations reviewed.

## 2021-01-05 ENCOUNTER — LAB (OUTPATIENT)
Dept: LAB | Facility: HOSPITAL | Age: 76
End: 2021-01-05

## 2021-01-05 DIAGNOSIS — Z11.59 ENCOUNTER FOR HEPATITIS C SCREENING TEST FOR LOW RISK PATIENT: ICD-10-CM

## 2021-01-05 DIAGNOSIS — R73.01 IMPAIRED FASTING GLUCOSE: ICD-10-CM

## 2021-01-05 DIAGNOSIS — E55.9 VITAMIN D DEFICIENCY: ICD-10-CM

## 2021-01-05 DIAGNOSIS — E78.5 HYPERLIPIDEMIA LDL GOAL <100: ICD-10-CM

## 2021-01-05 DIAGNOSIS — I10 BENIGN ESSENTIAL HYPERTENSION: ICD-10-CM

## 2021-01-05 LAB
ALBUMIN UR-MCNC: <1.2 MG/DL
CREAT UR-MCNC: 129.6 MG/DL
HBA1C MFR BLD: 5.1 % (ref 4.8–5.6)
MICROALBUMIN/CREAT UR: NORMAL MG/G{CREAT}

## 2021-01-05 PROCEDURE — 82306 VITAMIN D 25 HYDROXY: CPT

## 2021-01-05 PROCEDURE — 84439 ASSAY OF FREE THYROXINE: CPT

## 2021-01-05 PROCEDURE — 84443 ASSAY THYROID STIM HORMONE: CPT

## 2021-01-05 PROCEDURE — 86803 HEPATITIS C AB TEST: CPT

## 2021-01-05 PROCEDURE — 82043 UR ALBUMIN QUANTITATIVE: CPT

## 2021-01-05 PROCEDURE — 83036 HEMOGLOBIN GLYCOSYLATED A1C: CPT

## 2021-01-05 PROCEDURE — 82570 ASSAY OF URINE CREATININE: CPT

## 2021-01-05 PROCEDURE — 80061 LIPID PANEL: CPT

## 2021-01-05 PROCEDURE — 80053 COMPREHEN METABOLIC PANEL: CPT

## 2021-01-05 PROCEDURE — 85025 COMPLETE CBC W/AUTO DIFF WBC: CPT

## 2021-01-06 LAB
25(OH)D3 SERPL-MCNC: 30.1 NG/ML (ref 30–100)
ALBUMIN SERPL-MCNC: 4.9 G/DL (ref 3.5–5.2)
ALBUMIN/GLOB SERPL: 2.1 G/DL
ALP SERPL-CCNC: 74 U/L (ref 39–117)
ALT SERPL W P-5'-P-CCNC: 11 U/L (ref 1–33)
ANION GAP SERPL CALCULATED.3IONS-SCNC: 11.1 MMOL/L (ref 5–15)
AST SERPL-CCNC: 26 U/L (ref 1–32)
BASOPHILS # BLD AUTO: 0.07 10*3/MM3 (ref 0–0.2)
BASOPHILS NFR BLD AUTO: 1.5 % (ref 0–1.5)
BILIRUB SERPL-MCNC: 0.5 MG/DL (ref 0–1.2)
BUN SERPL-MCNC: 18 MG/DL (ref 8–23)
BUN/CREAT SERPL: 23.4 (ref 7–25)
CALCIUM SPEC-SCNC: 9.7 MG/DL (ref 8.6–10.5)
CHLORIDE SERPL-SCNC: 97 MMOL/L (ref 98–107)
CHOLEST SERPL-MCNC: 207 MG/DL (ref 0–200)
CO2 SERPL-SCNC: 27.9 MMOL/L (ref 22–29)
CREAT SERPL-MCNC: 0.77 MG/DL (ref 0.57–1)
DEPRECATED RDW RBC AUTO: 41 FL (ref 37–54)
EOSINOPHIL # BLD AUTO: 0.17 10*3/MM3 (ref 0–0.4)
EOSINOPHIL NFR BLD AUTO: 3.6 % (ref 0.3–6.2)
ERYTHROCYTE [DISTWIDTH] IN BLOOD BY AUTOMATED COUNT: 11.7 % (ref 12.3–15.4)
GFR SERPL CREATININE-BSD FRML MDRD: 73 ML/MIN/1.73
GLOBULIN UR ELPH-MCNC: 2.3 GM/DL
GLUCOSE SERPL-MCNC: 82 MG/DL (ref 65–99)
HCT VFR BLD AUTO: 39.7 % (ref 34–46.6)
HCV AB SER DONR QL: NORMAL
HDLC SERPL-MCNC: 119 MG/DL (ref 40–60)
HGB BLD-MCNC: 13.7 G/DL (ref 12–15.9)
IMM GRANULOCYTES # BLD AUTO: 0.01 10*3/MM3 (ref 0–0.05)
IMM GRANULOCYTES NFR BLD AUTO: 0.2 % (ref 0–0.5)
LDLC SERPL CALC-MCNC: 77 MG/DL (ref 0–100)
LDLC/HDLC SERPL: 0.64 {RATIO}
LYMPHOCYTES # BLD AUTO: 2.11 10*3/MM3 (ref 0.7–3.1)
LYMPHOCYTES NFR BLD AUTO: 44.5 % (ref 19.6–45.3)
MCH RBC QN AUTO: 33.5 PG (ref 26.6–33)
MCHC RBC AUTO-ENTMCNC: 34.5 G/DL (ref 31.5–35.7)
MCV RBC AUTO: 97.1 FL (ref 79–97)
MONOCYTES # BLD AUTO: 0.5 10*3/MM3 (ref 0.1–0.9)
MONOCYTES NFR BLD AUTO: 10.5 % (ref 5–12)
NEUTROPHILS NFR BLD AUTO: 1.88 10*3/MM3 (ref 1.7–7)
NEUTROPHILS NFR BLD AUTO: 39.7 % (ref 42.7–76)
NRBC BLD AUTO-RTO: 0.2 /100 WBC (ref 0–0.2)
PLATELET # BLD AUTO: 311 10*3/MM3 (ref 140–450)
PMV BLD AUTO: 9.4 FL (ref 6–12)
POTASSIUM SERPL-SCNC: 4.2 MMOL/L (ref 3.5–5.2)
PROT SERPL-MCNC: 7.2 G/DL (ref 6–8.5)
RBC # BLD AUTO: 4.09 10*6/MM3 (ref 3.77–5.28)
SODIUM SERPL-SCNC: 136 MMOL/L (ref 136–145)
T4 FREE SERPL-MCNC: 0.99 NG/DL (ref 0.93–1.7)
TRIGL SERPL-MCNC: 58 MG/DL (ref 0–150)
TSH SERPL DL<=0.05 MIU/L-ACNC: 7.66 UIU/ML (ref 0.27–4.2)
VLDLC SERPL-MCNC: 11 MG/DL (ref 5–40)
WBC # BLD AUTO: 4.74 10*3/MM3 (ref 3.4–10.8)

## 2021-03-08 RX ORDER — ATORVASTATIN CALCIUM 10 MG/1
TABLET, FILM COATED ORAL
Qty: 90 TABLET | Refills: 1 | Status: SHIPPED | OUTPATIENT
Start: 2021-03-08 | End: 2021-07-06

## 2021-04-30 ENCOUNTER — OFFICE VISIT (OUTPATIENT)
Dept: INTERNAL MEDICINE | Facility: CLINIC | Age: 76
End: 2021-04-30

## 2021-04-30 ENCOUNTER — TELEPHONE (OUTPATIENT)
Dept: INTERNAL MEDICINE | Facility: CLINIC | Age: 76
End: 2021-04-30

## 2021-04-30 VITALS
HEART RATE: 61 BPM | BODY MASS INDEX: 20.72 KG/M2 | HEIGHT: 67 IN | SYSTOLIC BLOOD PRESSURE: 121 MMHG | OXYGEN SATURATION: 99 % | DIASTOLIC BLOOD PRESSURE: 70 MMHG | TEMPERATURE: 99.1 F | WEIGHT: 132 LBS

## 2021-04-30 DIAGNOSIS — N76.0 ACUTE VAGINITIS: Primary | ICD-10-CM

## 2021-04-30 PROCEDURE — 99213 OFFICE O/P EST LOW 20 MIN: CPT | Performed by: PHYSICIAN ASSISTANT

## 2021-04-30 NOTE — TELEPHONE ENCOUNTER
Caller: Sandy Brownlee    Relationship to patient: Self    Best call back number: [phone]    How long have you been experiencing symptoms: BURNING AROUND HER VAGINA    Is it the symptoms constant or intermittent: [x] Constant  [] Intermittent    What therapies/medications have you tried: VASELINE.  PATIENT STATES THAT SHE IS HAVING VAGINAL IRRITATION AND WOULD LIKE TO SEE WHAT DR ALBARADO RECOMMENDED.    If a prescription is needed, what is your preferred pharmacy: [facility]

## 2021-04-30 NOTE — PROGRESS NOTES
"Patient Care Team:  Sylvester Roman MD as PCP - General (Internal Medicine)  Sirisha Partida MD as Consulting Physician (Dermatology)  Daniel Pascal MD as Consulting Physician (Ophthalmology)    Chief Complaint::   Chief Complaint   Patient presents with   • vaginal irritation        Subjective     HPI  Vaginitis  This is a new problem. The current episode started in the past 7 days. The problem occurs daily. The problem has been unchanged. Pertinent negatives include no abdominal pain, change in bowel habit or rash. Nothing aggravates the symptoms. Treatments tried: vaseline. The treatment provided mild relief.         The following portions of the patient's history were reviewed and updated as appropriate: active problem list, medication list, allergies, family history, social history    Review of Systems:   Review of Systems   Constitutional: Negative for activity change.   Gastrointestinal: Negative for abdominal pain and change in bowel habit.   Genitourinary: Positive for vaginal pain. Negative for decreased urine volume, difficulty urinating, dyspareunia, dysuria, frequency, genital sores, pelvic pain, pelvic pressure, urgency, urinary incontinence, vaginal bleeding and vaginal discharge.   Skin: Negative for rash.       Vital Signs  Vitals:    04/30/21 1600   BP: 121/70   BP Location: Left arm   Patient Position: Sitting   Cuff Size: Adult   Pulse: 61   Temp: 99.1 °F (37.3 °C)   TempSrc: Temporal   SpO2: 99%   Weight: 59.9 kg (132 lb)   Height: 170.2 cm (67.01\")   PainSc:   4     Body mass index is 20.67 kg/m².    Labs  No visits with results within 3 Month(s) from this visit.   Latest known visit with results is:   Lab on 01/05/2021   Component Date Value Ref Range Status   • Hepatitis C Ab 01/05/2021 Non-Reactive  Non-Reactive Final   • 25 Hydroxy, Vitamin D 01/05/2021 30.1  30.0 - 100.0 ng/ml Final   • Glucose 01/05/2021 82  65 - 99 mg/dL Final   • BUN 01/05/2021 18  8 - 23 mg/dL Final   • " Creatinine 01/05/2021 0.77  0.57 - 1.00 mg/dL Final   • Sodium 01/05/2021 136  136 - 145 mmol/L Final   • Potassium 01/05/2021 4.2  3.5 - 5.2 mmol/L Final   • Chloride 01/05/2021 97* 98 - 107 mmol/L Final   • CO2 01/05/2021 27.9  22.0 - 29.0 mmol/L Final   • Calcium 01/05/2021 9.7  8.6 - 10.5 mg/dL Final   • Total Protein 01/05/2021 7.2  6.0 - 8.5 g/dL Final   • Albumin 01/05/2021 4.90  3.50 - 5.20 g/dL Final   • ALT (SGPT) 01/05/2021 11  1 - 33 U/L Final   • AST (SGOT) 01/05/2021 26  1 - 32 U/L Final   • Alkaline Phosphatase 01/05/2021 74  39 - 117 U/L Final   • Total Bilirubin 01/05/2021 0.5  0.0 - 1.2 mg/dL Final   • eGFR Non  Amer 01/05/2021 73  >60 mL/min/1.73 Final   • Globulin 01/05/2021 2.3  gm/dL Final   • A/G Ratio 01/05/2021 2.1  g/dL Final   • BUN/Creatinine Ratio 01/05/2021 23.4  7.0 - 25.0 Final   • Anion Gap 01/05/2021 11.1  5.0 - 15.0 mmol/L Final   • Hemoglobin A1C 01/05/2021 5.10  4.80 - 5.60 % Final   • Total Cholesterol 01/05/2021 207* 0 - 200 mg/dL Final   • Triglycerides 01/05/2021 58  0 - 150 mg/dL Final   • HDL Cholesterol 01/05/2021 119* 40 - 60 mg/dL Final   • LDL Cholesterol  01/05/2021 77  0 - 100 mg/dL Final   • VLDL Cholesterol 01/05/2021 11  5 - 40 mg/dL Final   • LDL/HDL Ratio 01/05/2021 0.64   Final   • Microalbumin/Creatinine Ratio 01/05/2021    Final    Unable to calculate   • Creatinine, Urine 01/05/2021 129.6  mg/dL Final   • Microalbumin, Urine 01/05/2021 <1.2  mg/dL Final   • TSH 01/05/2021 7.660* 0.270 - 4.200 uIU/mL Final   • WBC 01/05/2021 4.74  3.40 - 10.80 10*3/mm3 Final   • RBC 01/05/2021 4.09  3.77 - 5.28 10*6/mm3 Final   • Hemoglobin 01/05/2021 13.7  12.0 - 15.9 g/dL Final   • Hematocrit 01/05/2021 39.7  34.0 - 46.6 % Final   • MCV 01/05/2021 97.1* 79.0 - 97.0 fL Final   • MCH 01/05/2021 33.5* 26.6 - 33.0 pg Final   • MCHC 01/05/2021 34.5  31.5 - 35.7 g/dL Final   • RDW 01/05/2021 11.7* 12.3 - 15.4 % Final   • RDW-SD 01/05/2021 41.0  37.0 - 54.0 fl Final   • MPV  01/05/2021 9.4  6.0 - 12.0 fL Final   • Platelets 01/05/2021 311  140 - 450 10*3/mm3 Final   • Neutrophil % 01/05/2021 39.7* 42.7 - 76.0 % Final   • Lymphocyte % 01/05/2021 44.5  19.6 - 45.3 % Final   • Monocyte % 01/05/2021 10.5  5.0 - 12.0 % Final   • Eosinophil % 01/05/2021 3.6  0.3 - 6.2 % Final   • Basophil % 01/05/2021 1.5  0.0 - 1.5 % Final   • Immature Grans % 01/05/2021 0.2  0.0 - 0.5 % Final   • Neutrophils, Absolute 01/05/2021 1.88  1.70 - 7.00 10*3/mm3 Final   • Lymphocytes, Absolute 01/05/2021 2.11  0.70 - 3.10 10*3/mm3 Final   • Monocytes, Absolute 01/05/2021 0.50  0.10 - 0.90 10*3/mm3 Final   • Eosinophils, Absolute 01/05/2021 0.17  0.00 - 0.40 10*3/mm3 Final   • Basophils, Absolute 01/05/2021 0.07  0.00 - 0.20 10*3/mm3 Final   • Immature Grans, Absolute 01/05/2021 0.01  0.00 - 0.05 10*3/mm3 Final   • nRBC 01/05/2021 0.2  0.0 - 0.2 /100 WBC Final   • Free T4 01/05/2021 0.99  0.93 - 1.70 ng/dL Final       Imaging  No radiology results for the last 30 days.      Current Outpatient Medications:   •  aspirin (aspirin) 81 MG EC tablet, Take 1 tablet by mouth Daily., Disp:  , Rfl:   •  atorvastatin (LIPITOR) 10 MG tablet, TAKE 1 TABLET BY MOUTH EVERY NIGHT AT BEDTIME, Disp: 90 tablet, Rfl: 1  •  B Complex Vitamins (vitamin b complex) capsule capsule, Take  by mouth Daily., Disp: , Rfl:   •  lisinopril-hydrochlorothiazide (PRINZIDE,ZESTORETIC) 20-12.5 MG per tablet, TAKE 1 TABLET BY MOUTH DAILY, Disp: 30 tablet, Rfl: 5  •  magnesium oxide (MAGOX) 400 (241.3 Mg) MG tablet tablet, Take 400 mg by mouth Daily., Disp: , Rfl:   •  vitamin B-12 (CYANOCOBALAMIN) 500 MCG tablet, Take 500 mcg by mouth Daily., Disp: , Rfl:     Physical Exam:    Physical Exam  Vitals and nursing note reviewed.   Constitutional:       Appearance: She is normal weight.   HENT:      Head: Normocephalic and atraumatic.   Abdominal:      General: Abdomen is flat. Bowel sounds are normal.   Genitourinary:     General: Normal vulva.       Vagina: Vaginal discharge present. No erythema, tenderness or bleeding.   Skin:     General: Skin is warm and dry.   Neurological:      General: No focal deficit present.      Mental Status: She is alert and oriented to person, place, and time. Mental status is at baseline.   Psychiatric:         Mood and Affect: Mood normal.         Behavior: Behavior normal.         Thought Content: Thought content normal.         Judgment: Judgment normal.         Procedures        Assessment/Plan   Problem List Items Addressed This Visit        Genitourinary and Reproductive     Acute vaginitis - Primary    Overview     Yellow-brown discharge noted  Culture obtained.  Will call patient with results on Monday/Tuesday.  Okay to use Vaseline or Aquaphor externally.         Relevant Orders    OneSwab - Kit, Vagina          Return if symptoms worsen or fail to improve.    Plan of care reviewed with patient at the conclusion of today's visit. Education was provided regarding diagnosis, management, and any prescribed or recommended OTC medications.Patient verbalizes understanding of and agreement with management plan.       Mar Soto PA-C    Please note that portions of this note were completed with a voice recognition program. Efforts were made to edit the dictations, but occasionally words are mistranscribed.

## 2021-05-05 ENCOUNTER — TELEPHONE (OUTPATIENT)
Dept: INTERNAL MEDICINE | Facility: CLINIC | Age: 76
End: 2021-05-05

## 2021-05-05 NOTE — TELEPHONE ENCOUNTER
Caller: Sandy Brownlee    Relationship: Self    Best call back number:[phone]    Caller requesting test results: SARMAD    What test was performed: LAB    Additional notes: LOOKING FOR RESULTS OF 04/30/21 LAB RESULTS

## 2021-05-06 ENCOUNTER — TELEPHONE (OUTPATIENT)
Dept: INTERNAL MEDICINE | Facility: CLINIC | Age: 76
End: 2021-05-06

## 2021-05-06 ENCOUNTER — OFFICE VISIT (OUTPATIENT)
Dept: INTERNAL MEDICINE | Facility: CLINIC | Age: 76
End: 2021-05-06

## 2021-05-06 VITALS
DIASTOLIC BLOOD PRESSURE: 70 MMHG | BODY MASS INDEX: 20.72 KG/M2 | HEART RATE: 68 BPM | OXYGEN SATURATION: 98 % | HEIGHT: 67 IN | SYSTOLIC BLOOD PRESSURE: 108 MMHG | WEIGHT: 132 LBS | TEMPERATURE: 97.5 F

## 2021-05-06 DIAGNOSIS — N76.0 ACUTE VAGINITIS: Primary | ICD-10-CM

## 2021-05-06 NOTE — PROGRESS NOTES
"Patient Care Team:  Sylvester Roman MD as PCP - General (Internal Medicine)  Sirisha Partida MD as Consulting Physician (Dermatology)  Daniel Pascal MD as Consulting Physician (Ophthalmology)    Chief Complaint::   Chief Complaint   Patient presents with   • repeat culture        Subjective     HPI  Anita is a 75-year-old female who presents today for follow-up of vaginitis.  A culture was performed on 4/30/2021.  However, there were complications with picking up the culture and results are not available.  She returns for repeat culture.  She continues to have vaginal burning and slight yellow discharge.        The following portions of the patient's history were reviewed and updated as appropriate: active problem list, medication list, allergies, family history, social history    Review of Systems:   Review of Systems   Constitutional: Negative for activity change and appetite change.   Eyes: Negative for blurred vision and double vision.   Respiratory: Negative for shortness of breath.    Cardiovascular: Negative for chest pain, palpitations and leg swelling.   Gastrointestinal: Negative for abdominal distention and abdominal pain.   Endocrine: Negative for cold intolerance and heat intolerance.   Genitourinary: Positive for vaginal discharge and vaginal pain. Negative for breast discharge, breast lump, breast pain and vaginal bleeding.   Hematological: Negative for adenopathy. Does not bruise/bleed easily.       Vital Signs  Vitals:    05/06/21 1557   BP: 108/70   BP Location: Left arm   Patient Position: Sitting   Cuff Size: Adult   Pulse: 68   Temp: 97.5 °F (36.4 °C)   TempSrc: Temporal   SpO2: 98%   Weight: 59.9 kg (132 lb)   Height: 170.2 cm (67.01\")   PainSc: 0-No pain     Body mass index is 20.67 kg/m².    Labs  No visits with results within 3 Month(s) from this visit.   Latest known visit with results is:   Lab on 01/05/2021   Component Date Value Ref Range Status   • Hepatitis C Ab " 01/05/2021 Non-Reactive  Non-Reactive Final   • 25 Hydroxy, Vitamin D 01/05/2021 30.1  30.0 - 100.0 ng/ml Final   • Glucose 01/05/2021 82  65 - 99 mg/dL Final   • BUN 01/05/2021 18  8 - 23 mg/dL Final   • Creatinine 01/05/2021 0.77  0.57 - 1.00 mg/dL Final   • Sodium 01/05/2021 136  136 - 145 mmol/L Final   • Potassium 01/05/2021 4.2  3.5 - 5.2 mmol/L Final   • Chloride 01/05/2021 97* 98 - 107 mmol/L Final   • CO2 01/05/2021 27.9  22.0 - 29.0 mmol/L Final   • Calcium 01/05/2021 9.7  8.6 - 10.5 mg/dL Final   • Total Protein 01/05/2021 7.2  6.0 - 8.5 g/dL Final   • Albumin 01/05/2021 4.90  3.50 - 5.20 g/dL Final   • ALT (SGPT) 01/05/2021 11  1 - 33 U/L Final   • AST (SGOT) 01/05/2021 26  1 - 32 U/L Final   • Alkaline Phosphatase 01/05/2021 74  39 - 117 U/L Final   • Total Bilirubin 01/05/2021 0.5  0.0 - 1.2 mg/dL Final   • eGFR Non  Amer 01/05/2021 73  >60 mL/min/1.73 Final   • Globulin 01/05/2021 2.3  gm/dL Final   • A/G Ratio 01/05/2021 2.1  g/dL Final   • BUN/Creatinine Ratio 01/05/2021 23.4  7.0 - 25.0 Final   • Anion Gap 01/05/2021 11.1  5.0 - 15.0 mmol/L Final   • Hemoglobin A1C 01/05/2021 5.10  4.80 - 5.60 % Final   • Total Cholesterol 01/05/2021 207* 0 - 200 mg/dL Final   • Triglycerides 01/05/2021 58  0 - 150 mg/dL Final   • HDL Cholesterol 01/05/2021 119* 40 - 60 mg/dL Final   • LDL Cholesterol  01/05/2021 77  0 - 100 mg/dL Final   • VLDL Cholesterol 01/05/2021 11  5 - 40 mg/dL Final   • LDL/HDL Ratio 01/05/2021 0.64   Final   • Microalbumin/Creatinine Ratio 01/05/2021    Final    Unable to calculate   • Creatinine, Urine 01/05/2021 129.6  mg/dL Final   • Microalbumin, Urine 01/05/2021 <1.2  mg/dL Final   • TSH 01/05/2021 7.660* 0.270 - 4.200 uIU/mL Final   • WBC 01/05/2021 4.74  3.40 - 10.80 10*3/mm3 Final   • RBC 01/05/2021 4.09  3.77 - 5.28 10*6/mm3 Final   • Hemoglobin 01/05/2021 13.7  12.0 - 15.9 g/dL Final   • Hematocrit 01/05/2021 39.7  34.0 - 46.6 % Final   • MCV 01/05/2021 97.1* 79.0 - 97.0  fL Final   • MCH 01/05/2021 33.5* 26.6 - 33.0 pg Final   • MCHC 01/05/2021 34.5  31.5 - 35.7 g/dL Final   • RDW 01/05/2021 11.7* 12.3 - 15.4 % Final   • RDW-SD 01/05/2021 41.0  37.0 - 54.0 fl Final   • MPV 01/05/2021 9.4  6.0 - 12.0 fL Final   • Platelets 01/05/2021 311  140 - 450 10*3/mm3 Final   • Neutrophil % 01/05/2021 39.7* 42.7 - 76.0 % Final   • Lymphocyte % 01/05/2021 44.5  19.6 - 45.3 % Final   • Monocyte % 01/05/2021 10.5  5.0 - 12.0 % Final   • Eosinophil % 01/05/2021 3.6  0.3 - 6.2 % Final   • Basophil % 01/05/2021 1.5  0.0 - 1.5 % Final   • Immature Grans % 01/05/2021 0.2  0.0 - 0.5 % Final   • Neutrophils, Absolute 01/05/2021 1.88  1.70 - 7.00 10*3/mm3 Final   • Lymphocytes, Absolute 01/05/2021 2.11  0.70 - 3.10 10*3/mm3 Final   • Monocytes, Absolute 01/05/2021 0.50  0.10 - 0.90 10*3/mm3 Final   • Eosinophils, Absolute 01/05/2021 0.17  0.00 - 0.40 10*3/mm3 Final   • Basophils, Absolute 01/05/2021 0.07  0.00 - 0.20 10*3/mm3 Final   • Immature Grans, Absolute 01/05/2021 0.01  0.00 - 0.05 10*3/mm3 Final   • nRBC 01/05/2021 0.2  0.0 - 0.2 /100 WBC Final   • Free T4 01/05/2021 0.99  0.93 - 1.70 ng/dL Final       Imaging  No radiology results for the last 30 days.      Current Outpatient Medications:   •  aspirin (aspirin) 81 MG EC tablet, Take 1 tablet by mouth Daily., Disp:  , Rfl:   •  atorvastatin (LIPITOR) 10 MG tablet, TAKE 1 TABLET BY MOUTH EVERY NIGHT AT BEDTIME, Disp: 90 tablet, Rfl: 1  •  B Complex Vitamins (vitamin b complex) capsule capsule, Take  by mouth Daily., Disp: , Rfl:   •  lisinopril-hydrochlorothiazide (PRINZIDE,ZESTORETIC) 20-12.5 MG per tablet, TAKE 1 TABLET BY MOUTH DAILY, Disp: 30 tablet, Rfl: 5  •  magnesium oxide (MAGOX) 400 (241.3 Mg) MG tablet tablet, Take 400 mg by mouth Daily., Disp: , Rfl:   •  vitamin B-12 (CYANOCOBALAMIN) 500 MCG tablet, Take 500 mcg by mouth Daily., Disp: , Rfl:     Physical Exam:    Physical Exam  Constitutional:       Appearance: Normal appearance. She  is normal weight.   HENT:      Head: Normocephalic and atraumatic.   Genitourinary:     Vagina: Vaginal discharge present.   Neurological:      Mental Status: She is alert.   Psychiatric:         Mood and Affect: Mood normal.         Behavior: Behavior normal.         Thought Content: Thought content normal.         Judgment: Judgment normal.         Procedures        Assessment/Plan   Problem List Items Addressed This Visit        Genitourinary and Reproductive     Acute vaginitis - Primary    Overview     Yellow-brown discharge noted  Repeat culture obtained.    Okay to use Vaseline or Aquaphor externally.         Relevant Orders    Anaerobic Culture - Swab, Vagina    Culture, Routine - Swab, Vagina    Gardnerella vaginalis, Trichomonas vaginalis, Candida albicans, DNA - Swab, Vagina          No follow-ups on file.    Plan of care reviewed with patient at the conclusion of today's visit. Education was provided regarding diagnosis, management, and any prescribed or recommended OTC medications.Patient verbalizes understanding of and agreement with management plan.       Mar Soto PA-C    Please note that portions of this note were completed with a voice recognition program. Efforts were made to edit the dictations, but occasionally words are mistranscribed.

## 2021-05-07 ENCOUNTER — LAB (OUTPATIENT)
Dept: LAB | Facility: HOSPITAL | Age: 76
End: 2021-05-07

## 2021-05-07 DIAGNOSIS — N76.0 ACUTE VAGINITIS: ICD-10-CM

## 2021-05-07 PROCEDURE — 87070 CULTURE OTHR SPECIMN AEROBIC: CPT

## 2021-05-07 PROCEDURE — 87205 SMEAR GRAM STAIN: CPT

## 2021-05-07 PROCEDURE — 87076 CULTURE ANAEROBE IDENT EACH: CPT

## 2021-05-07 PROCEDURE — 87147 CULTURE TYPE IMMUNOLOGIC: CPT

## 2021-05-08 DIAGNOSIS — N76.0 ACUTE VAGINITIS: Primary | ICD-10-CM

## 2021-05-08 RX ORDER — AMOXICILLIN AND CLAVULANATE POTASSIUM 875; 125 MG/1; MG/1
1 TABLET, FILM COATED ORAL 2 TIMES DAILY
Qty: 14 TABLET | Refills: 0 | Status: SHIPPED | OUTPATIENT
Start: 2021-05-08 | End: 2021-05-15

## 2021-05-09 LAB
BACTERIA SPEC AEROBE CULT: ABNORMAL
BACTERIA SPEC AEROBE CULT: ABNORMAL
GRAM STN SPEC: ABNORMAL
GRAM STN SPEC: ABNORMAL
STREP GROUPING: ABNORMAL

## 2021-05-10 ENCOUNTER — TELEPHONE (OUTPATIENT)
Dept: INTERNAL MEDICINE | Facility: CLINIC | Age: 76
End: 2021-05-10

## 2021-05-10 NOTE — TELEPHONE ENCOUNTER
Swab collected on 5/7 for vaginitis was not collected with the correct swab thus no results will be given (needed special media).

## 2021-05-11 DIAGNOSIS — N76.0 GARDNERELLA VAGINALIS INFECTION: Primary | ICD-10-CM

## 2021-05-11 DIAGNOSIS — B96.89 GARDNERELLA VAGINALIS INFECTION: Primary | ICD-10-CM

## 2021-05-11 RX ORDER — METRONIDAZOLE 7.5 MG/G
GEL VAGINAL NIGHTLY
Qty: 70 G | Refills: 0 | Status: SHIPPED | OUTPATIENT
Start: 2021-05-11 | End: 2021-05-27

## 2021-05-12 DIAGNOSIS — N76.0 ACUTE VAGINITIS: ICD-10-CM

## 2021-05-13 ENCOUNTER — TELEPHONE (OUTPATIENT)
Dept: INTERNAL MEDICINE | Facility: CLINIC | Age: 76
End: 2021-05-13

## 2021-05-13 DIAGNOSIS — Z01.419 GYNECOLOGIC EXAM NORMAL: Primary | ICD-10-CM

## 2021-05-13 RX ORDER — FLUCONAZOLE 150 MG/1
150 TABLET ORAL ONCE
Qty: 1 TABLET | Refills: 0 | Status: SHIPPED | OUTPATIENT
Start: 2021-05-13 | End: 2021-05-13

## 2021-05-13 NOTE — TELEPHONE ENCOUNTER
Caller: Sandy Brownlee    Relationship: Self    Best call back number: [phone]     What medication are you requesting: SOMETHING FOR VAGINAL YEAST INFECTION    If a prescription is needed, what is your preferred pharmacy and phone number: [id number] - [place], [place] - [address] AT [facility] [address] . [facility] [phone] [facility] [phone]      Additional notes: PLEASE CALL PATIENT ABOUT THIS

## 2021-05-13 NOTE — TELEPHONE ENCOUNTER
Patient verbalized understanding and states she will like for your to refer her to a gynecologist

## 2021-05-26 ENCOUNTER — TELEPHONE (OUTPATIENT)
Dept: INTERNAL MEDICINE | Facility: CLINIC | Age: 76
End: 2021-05-26

## 2021-05-26 NOTE — TELEPHONE ENCOUNTER
Caller: Sandy Brownlee    Relationship: Self    Best call back number: [phone]    What is the best time to reach you: ANYTIME     Who are you requesting to speak with (clinical staff, provider,  specific staff member): DOCTOR KHAN NURSE         What was the call regarding: THE PATIENT STATES THAT DOCTOR ALBARADO REFERRED HER TO A GYNECOLOGIST SHE NEEDS TO FIND OUT WHEN THAT APPOINTMENT IS     Do you require a callback: YES

## 2021-05-27 ENCOUNTER — OFFICE VISIT (OUTPATIENT)
Dept: OBSTETRICS AND GYNECOLOGY | Facility: CLINIC | Age: 76
End: 2021-05-27

## 2021-05-27 VITALS
DIASTOLIC BLOOD PRESSURE: 76 MMHG | HEIGHT: 67 IN | BODY MASS INDEX: 20.81 KG/M2 | WEIGHT: 132.6 LBS | SYSTOLIC BLOOD PRESSURE: 112 MMHG

## 2021-05-27 DIAGNOSIS — B37.31 YEAST VAGINITIS: Primary | ICD-10-CM

## 2021-05-27 DIAGNOSIS — N95.2 VAGINAL ATROPHY: ICD-10-CM

## 2021-05-27 LAB — WET PREP GENITAL: NORMAL

## 2021-05-27 PROCEDURE — 87210 SMEAR WET MOUNT SALINE/INK: CPT | Performed by: NURSE PRACTITIONER

## 2021-05-27 PROCEDURE — 99203 OFFICE O/P NEW LOW 30 MIN: CPT | Performed by: NURSE PRACTITIONER

## 2021-05-27 RX ORDER — FLUCONAZOLE 150 MG/1
TABLET ORAL
Qty: 3 TABLET | Refills: 1 | Status: SHIPPED | OUTPATIENT
Start: 2021-05-27 | End: 2021-12-13

## 2021-05-27 RX ORDER — MULTIPLE VITAMINS W/ MINERALS TAB 9MG-400MCG
1 TAB ORAL DAILY
COMMUNITY

## 2021-05-27 NOTE — PROGRESS NOTES
Chief Complaint   Patient presents with   • Vaginitis         Subjective   HPI  Sandy Brownlee is a 75 y.o. female, , who presents with evaluation of vaginal discharge.  She describes this as white and thick.    She states she has experienced vaginal discharge, burning, itching x 3 week.  She was on an antibiotic per her PCP. She began itching and took one Diflucan tab.  Symptoms improved but are still a problem. Patient notes aggravating factors include bathing/baths/showers.      Marital Status: .  New Partners since last visit: no.  Desires STD Screening: no.    She denies pelvic pain, fever, vaginal bleeding.     Additional OB/GYN History   Last Pap : unsure    History of abnormal Pap smear: no  OB History        2    Para   2    Term   1            AB        Living   2       SAB        TAB        Ectopic        Molar        Multiple        Live Births   2                The additional following portions of the patient's history were reviewed and updated as appropriate: allergies, current medications, past family history, past medical history, past social history, past surgical history and problem list.    Review of Systems   Constitutional: Negative for fatigue and fever.   Respiratory: Negative for cough and shortness of breath.    Cardiovascular: Negative for chest pain and leg swelling.   Gastrointestinal: Negative.    Genitourinary: Positive for dyspareunia, vaginal discharge and vaginal pain. Negative for breast discharge, breast lump, breast pain, difficulty urinating, dysuria, frequency, genital sores, pelvic pain, urgency, urinary incontinence and vaginal bleeding.   Musculoskeletal: Negative for back pain and gait problem.   Neurological: Negative for dizziness and headache.   Psychiatric/Behavioral: Negative for depressed mood. The patient is not nervous/anxious.      All other systems reviewed and are negative.     I have reviewed and agree with the HPI, ROS, and  "historical information as entered above. Parul García, APRN    Objective   /76   Ht 170.2 cm (67\")   Wt 60.1 kg (132 lb 9.6 oz)   Breastfeeding No   BMI 20.77 kg/m²     Physical Exam  Vitals and nursing note reviewed. Exam conducted with a chaperone present.   Constitutional:       General: She is not in acute distress.     Appearance: Normal appearance. She is normal weight. She is not ill-appearing.   Pulmonary:      Effort: Pulmonary effort is normal.   Abdominal:      General: There is no distension.      Palpations: Abdomen is soft. There is no mass.      Tenderness: There is no abdominal tenderness. There is no guarding or rebound.      Hernia: No hernia is present.   Genitourinary:     General: Normal vulva.      Vagina: Vaginal discharge present.      Uterus: Absent.       Comments: Scant amt white/yellow discharge.  Vaginal atrophy noted.  Neurological:      Mental Status: She is alert.         Wet mount performed? Yes.  +yeast    Assessment/Plan     Assessment and Plan    Problem List Items Addressed This Visit     None      Visit Diagnoses     Yeast vaginitis    -  Primary    Relevant Medications    fluconazole (Diflucan) 150 MG tablet    Other Relevant Orders    POC Wet Prep (Completed)    Vaginal atrophy              D/w pt take Diflucan every 3 days x3.  Call prn no relief within one week.  Reviewed options for atrophy; pt declines at this time.  She will continue Vaseline prn.        Parul García, APRN  05/27/2021  "

## 2021-07-06 RX ORDER — ATORVASTATIN CALCIUM 10 MG/1
TABLET, FILM COATED ORAL
Qty: 90 TABLET | Refills: 1 | Status: SHIPPED | OUTPATIENT
Start: 2021-07-06 | End: 2021-12-28

## 2021-12-01 RX ORDER — LISINOPRIL AND HYDROCHLOROTHIAZIDE 20; 12.5 MG/1; MG/1
TABLET ORAL
Qty: 30 TABLET | Refills: 5 | Status: SHIPPED | OUTPATIENT
Start: 2021-12-01 | End: 2022-05-16

## 2021-12-01 NOTE — TELEPHONE ENCOUNTER
Rx Refill Note  Requested Prescriptions     Pending Prescriptions Disp Refills   • lisinopril-hydrochlorothiazide (PRINZIDE,ZESTORETIC) 20-12.5 MG per tablet [Pharmacy Med Name: LISINOPRIL-HCTZ 20/12.5MG TABLETS] 30 tablet 5     Sig: TAKE 1 TABLET BY MOUTH DAILY      Last office visit with prescribing clinician: 12/10/2020      Next office visit with prescribing clinician: 12/13/2021            Adriana Uribe RN  12/01/21, 14:15 EST

## 2021-12-13 ENCOUNTER — OFFICE VISIT (OUTPATIENT)
Dept: INTERNAL MEDICINE | Facility: CLINIC | Age: 76
End: 2021-12-13

## 2021-12-13 VITALS
DIASTOLIC BLOOD PRESSURE: 58 MMHG | SYSTOLIC BLOOD PRESSURE: 128 MMHG | BODY MASS INDEX: 21.03 KG/M2 | TEMPERATURE: 98.2 F | WEIGHT: 134 LBS | HEIGHT: 67 IN | HEART RATE: 84 BPM

## 2021-12-13 DIAGNOSIS — I73.9 PERIPHERAL VASCULAR DISEASE (HCC): ICD-10-CM

## 2021-12-13 DIAGNOSIS — E55.9 VITAMIN D DEFICIENCY: ICD-10-CM

## 2021-12-13 DIAGNOSIS — Z00.00 MEDICARE ANNUAL WELLNESS VISIT, SUBSEQUENT: Primary | ICD-10-CM

## 2021-12-13 DIAGNOSIS — I10 BENIGN ESSENTIAL HYPERTENSION: ICD-10-CM

## 2021-12-13 DIAGNOSIS — E78.5 HYPERLIPIDEMIA LDL GOAL <100: ICD-10-CM

## 2021-12-13 DIAGNOSIS — R41.3 MEMORY LOSS: ICD-10-CM

## 2021-12-13 DIAGNOSIS — R73.01 IMPAIRED FASTING GLUCOSE: ICD-10-CM

## 2021-12-13 DIAGNOSIS — R42 DIZZINESS: ICD-10-CM

## 2021-12-13 DIAGNOSIS — R25.2 CRAMPS OF LEFT LOWER EXTREMITY: ICD-10-CM

## 2021-12-13 PROCEDURE — 1126F AMNT PAIN NOTED NONE PRSNT: CPT | Performed by: INTERNAL MEDICINE

## 2021-12-13 PROCEDURE — 93000 ELECTROCARDIOGRAM COMPLETE: CPT | Performed by: INTERNAL MEDICINE

## 2021-12-13 PROCEDURE — G0439 PPPS, SUBSEQ VISIT: HCPCS | Performed by: INTERNAL MEDICINE

## 2021-12-13 PROCEDURE — 1159F MED LIST DOCD IN RCRD: CPT | Performed by: INTERNAL MEDICINE

## 2021-12-13 PROCEDURE — 1170F FXNL STATUS ASSESSED: CPT | Performed by: INTERNAL MEDICINE

## 2021-12-13 PROCEDURE — 99214 OFFICE O/P EST MOD 30 MIN: CPT | Performed by: INTERNAL MEDICINE

## 2021-12-13 RX ORDER — DONEPEZIL HYDROCHLORIDE 5 MG/1
5 TABLET, FILM COATED ORAL NIGHTLY
Qty: 90 TABLET | Refills: 1 | Status: SHIPPED | OUTPATIENT
Start: 2021-12-13 | End: 2022-03-28

## 2021-12-13 NOTE — ASSESSMENT & PLAN NOTE
She is following with Dr Sirisha Partida for skin survey a few weeks ago. Her mood is good overall and her hearing is good overall and had good eye exam last month. She has mild memory loss. She has 2 of 3 recall. Her MMSE was 27/30. Age-appropriate Counseling:  Discussed preventative medicine issues with patient including regular exercise, healthy diet, stress reduction, adequate sleep and recommended age-appropriate screening studies.  Immunizations reviewed.

## 2021-12-13 NOTE — PROGRESS NOTES
QUICK REFERENCE INFORMATION:  The ABCs of the Annual Wellness Visit    Subsequent Medicare Wellness Visit    HEALTH RISK ASSESSMENT    1945    Recent Hospitalizations:  No hospitalization(s) within the last year..        Current Medical Providers:  Patient Care Team:  Sylvester Roman MD as PCP - General (Internal Medicine)  Sirisha Partida MD as Consulting Physician (Dermatology)  Daniel Pascal MD as Consulting Physician (Ophthalmology)        Smoking Status:  Social History     Tobacco Use   Smoking Status Never Smoker   Smokeless Tobacco Never Used       Alcohol Consumption:  Social History     Substance and Sexual Activity   Alcohol Use Yes   • Alcohol/week: 2.0 standard drinks   • Types: 2 Glasses of wine per week    Comment: nightly        Depression Screen:   PHQ-2/PHQ-9 Depression Screening 12/13/2021   Little interest or pleasure in doing things 0   Feeling down, depressed, or hopeless 0   Total Score 0       Health Habits and Functional and Cognitive Screening:  Functional & Cognitive Status 12/13/2021   Do you have difficulty preparing food and eating? No   Do you have difficulty bathing yourself, getting dressed or grooming yourself? No   Do you have difficulty using the toilet? No   Do you have difficulty moving around from place to place? No   Do you have trouble with steps or getting out of a bed or a chair? No   Current Diet Well Balanced Diet   Dental Exam Up to date   Eye Exam Up to date   Exercise (times per week) 5 times per week   Current Exercises Include Walking   Current Exercise Activities Include -   Do you need help using the phone?  No   Are you deaf or do you have serious difficulty hearing?  No   Do you need help with transportation? No   Do you need help shopping? No   Do you need help preparing meals?  No   Do you need help with housework?  No   Do you need help with laundry? No   Do you need help taking your medications? No   Do you need help managing money? No    Do you ever drive or ride in a car without wearing a seat belt? No   Have you felt unusual stress, anger or loneliness in the last month? No   Who do you live with? Spouse   If you need help, do you have trouble finding someone available to you? No   Have you been bothered in the last four weeks by sexual problems? No   Do you have difficulty concentrating, remembering or making decisions? No       Fall Risk Screen:  FARHEEN Fall Risk Assessment was completed, and patient is at LOW risk for falls.Assessment completed on:12/13/2021    ACE III MINI        Does the patient have evidence of cognitive impairment? Yes    Aspirin use counseling: Taking ASA appropriately as indicated    Recent Lab Results:  CMP:  Lab Results   Component Value Date    BUN 18 01/05/2021    CREATININE 0.77 01/05/2021    EGFRIFNONA 73 01/05/2021    BCR 23.4 01/05/2021     01/05/2021    K 4.2 01/05/2021    CO2 27.9 01/05/2021    CALCIUM 9.7 01/05/2021    ALBUMIN 4.90 01/05/2021    BILITOT 0.5 01/05/2021    ALKPHOS 74 01/05/2021    AST 26 01/05/2021    ALT 11 01/05/2021     HbA1c:  Lab Results   Component Value Date    HGBA1C 5.10 01/05/2021    HGBA1C 5.40 11/14/2019     Microalbumin:  Lab Results   Component Value Date    MICROALBUR <1.2 01/05/2021     Lipid Panel  Lab Results   Component Value Date    CHOL 207 (H) 01/05/2021    TRIG 58 01/05/2021     (H) 01/05/2021    LDL 77 01/05/2021    AST 26 01/05/2021    ALT 11 01/05/2021       Visual Acuity:  No exam data present    Age-appropriate Screening Schedule:  Refer to the list below for future screening recommendations based on patient's age, sex and/or medical conditions. Orders for these recommended tests are listed in the plan section. The patient has been provided with a written plan.    Health Maintenance   Topic Date Due   • DXA SCAN  Never done   • ZOSTER VACCINE (3 of 3) 10/15/2019   • TDAP/TD VACCINES (2 - Td or Tdap) 03/22/2021   • LIPID PANEL  01/05/2022   • INFLUENZA  VACCINE  Completed        Subjective   History of Present Illness    Sandy Brownlee is a 75 y.o. female who presents for a Subsequent Wellness Visit.She has had couple days of dizziiness with activity at times. She has some memory loss.. She denies headaches     CHRONIC CONDITIONS    The following portions of the patient's history were reviewed and updated as appropriate: allergies, current medications, past family history, past medical history, past social history, past surgical history and problem list.    Outpatient Medications Prior to Visit   Medication Sig Dispense Refill   • aspirin (aspirin) 81 MG EC tablet Take 1 tablet by mouth Daily.     • atorvastatin (LIPITOR) 10 MG tablet TAKE 1 TABLET BY MOUTH EVERY NIGHT AT BEDTIME 90 tablet 1   • lisinopril-hydrochlorothiazide (PRINZIDE,ZESTORETIC) 20-12.5 MG per tablet TAKE 1 TABLET BY MOUTH DAILY 30 tablet 5   • multivitamin with minerals tablet tablet Take 1 tablet by mouth Daily.     • psyllium (KONSYL) 28.3 % pack pack Take 1 packet by mouth Daily.     • fluconazole (Diflucan) 150 MG tablet 1 po every 3 days x 3 3 tablet 1     No facility-administered medications prior to visit.       Patient Active Problem List   Diagnosis   • Blister of great toe of left foot, initial encounter   • Aphthous ulcer   • Benign essential hypertension   • Cough   • Heart murmur   • History of squamous cell carcinoma of skin   • Hyperlipidemia LDL goal <100   • Hypothyroidism   • Impaired fasting glucose   • Medicare annual wellness visit, subsequent   • Nipple pain   • Peripheral vascular disease (HCC)   • Vitamin D deficiency   • Acute vaginitis   • Dizziness   • Memory loss       Advance Care Planning:  ACP discussion was held with the patient during this visit. Patient has an advance directive in EMR which is still valid.     Identification of Risk Factors:  Risk factors include: Advance Directive Discussion  Polypharmacy.    Review of Systems    Compared to one year ago,  "the patient feels her physical health is worse.  Compared to one year ago, the patient feels her mental health is worse.    Objective     Physical Exam  Vitals and nursing note reviewed.   Constitutional:       Appearance: Normal appearance. She is well-developed.   HENT:      Head: Normocephalic.      Right Ear: Tympanic membrane and ear canal normal.      Left Ear: Tympanic membrane and ear canal normal.   Eyes:      Extraocular Movements: Extraocular movements intact.      Conjunctiva/sclera: Conjunctivae normal.   Neck:      Vascular: No carotid bruit.   Cardiovascular:      Rate and Rhythm: Normal rate and regular rhythm.      Heart sounds: Normal heart sounds.   Pulmonary:      Effort: Pulmonary effort is normal. No respiratory distress.      Breath sounds: Normal breath sounds.   Abdominal:      General: Bowel sounds are normal.      Palpations: Abdomen is soft.      Tenderness: There is no abdominal tenderness.   Skin:     General: Skin is warm and dry.   Neurological:      General: No focal deficit present.      Mental Status: She is alert and oriented to person, place, and time.      Comments: Her get up and go is good and MMSE 27/30   Psychiatric:         Mood and Affect: Mood normal.         Behavior: Behavior normal.            ECG 12 Lead    Date/Time: 12/13/2021 10:14 PM  Performed by: Sylvester Roman MD  Authorized by: Sylvester Roman MD   Comparison: compared with previous ECG from 12/11/2020  Similar to previous ECG  Rhythm: sinus rhythm  Rate: normal  QRS axis: normal    Clinical impression: normal ECG             Vitals:    12/13/21 1037   BP: 128/58   Pulse: 84   Temp: 98.2 °F (36.8 °C)   Weight: 60.8 kg (134 lb)   Height: 170.2 cm (67.01\")   PainSc: 0-No pain       Patient's Body mass index is 20.98 kg/m². indicating that she is within normal range (BMI 18.5-24.9). No BMI management plan needed..      Assessment/Plan   Problem List Items Addressed This Visit        Cardiac and Vasculature "    Benign essential hypertension    Current Assessment & Plan     Hypertension is unchanged.  Continue current treatment regimen.  Regular aerobic exercise.  Blood pressure will be reassessed at the next regular appointment.         Relevant Medications    lisinopril-hydrochlorothiazide (PRINZIDE,ZESTORETIC) 20-12.5 MG per tablet    Other Relevant Orders    Comprehensive Metabolic Panel    Microalbumin / Creatinine Urine Ratio - Urine, Clean Catch    ECG 12 Lead    Hyperlipidemia LDL goal <100    Current Assessment & Plan     Lipid abnormalities are unchanged.  Nutritional counseling was provided. and Pharmacotherapy as ordered.  Lipids will be reassessed in 6 months.         Relevant Medications    atorvastatin (LIPITOR) 10 MG tablet    Other Relevant Orders    Lipid Panel    TSH Rfx On Abnormal To Free T4    High Sensitivity CRP    Duplex Carotid Ultrasound CAR    Peripheral vascular disease (HCC)    Current Assessment & Plan     She denies leg pain with activity and will proceed with carotid ultrasound.             Endocrine and Metabolic    Impaired fasting glucose    Current Assessment & Plan     Discussed decreasing bad carbohydrates, specifically sweets, breads, potatoes, corn and high caloric drinks (juices, sodas, sweet tea).  Also recommend increasing physical activity, ideally 150 minutes aerobic exercise weekly and resistance exercises 2-3x/week.         Relevant Orders    Hemoglobin A1c    Vitamin D deficiency    Relevant Orders    Vitamin D 25 Hydroxy       Health Encounters    Medicare annual wellness visit, subsequent - Primary    Current Assessment & Plan     She is following with Dr Sirisha Partida for skin survey a few weeks ago. Her mood is good overall and her hearing is good overall and had good eye exam last month. She has mild memory loss. She has 2 of 3 recall. Her MMSE was 27/30. Age-appropriate Counseling:  Discussed preventative medicine issues with patient including regular exercise,  healthy diet, stress reduction, adequate sleep and recommended age-appropriate screening studies.  Immunizations reviewed.                 Neuro    Memory loss    Current Assessment & Plan     Proceed with labs, scan and begin aricept.          Relevant Medications    donepezil (Aricept) 5 MG tablet    Other Relevant Orders    Vitamin B12    CT Head Without Contrast       Symptoms and Signs    Dizziness    Current Assessment & Plan     Acute issue and will proceed with labs. Proceed with CT scan of head and carotid ultrasound.          Relevant Orders    CBC & Differential    CT Head Without Contrast    Duplex Carotid Ultrasound CAR      Other Visit Diagnoses     Cramps of left lower extremity        Follow up labs.     Relevant Orders    Magnesium        Patient Self-Management and Personalized Health Advice  The patient has been provided with information about: diet, exercise, weight management and prevention of cardiac or vascular disease and preventive services including:   · Annual Wellness Visit (AWV).    Outpatient Encounter Medications as of 12/13/2021   Medication Sig Dispense Refill   • aspirin (aspirin) 81 MG EC tablet Take 1 tablet by mouth Daily.     • atorvastatin (LIPITOR) 10 MG tablet TAKE 1 TABLET BY MOUTH EVERY NIGHT AT BEDTIME 90 tablet 1   • lisinopril-hydrochlorothiazide (PRINZIDE,ZESTORETIC) 20-12.5 MG per tablet TAKE 1 TABLET BY MOUTH DAILY 30 tablet 5   • multivitamin with minerals tablet tablet Take 1 tablet by mouth Daily.     • psyllium (KONSYL) 28.3 % pack pack Take 1 packet by mouth Daily.     • donepezil (Aricept) 5 MG tablet Take 1 tablet by mouth Every Night. 90 tablet 1   • [DISCONTINUED] fluconazole (Diflucan) 150 MG tablet 1 po every 3 days x 3 3 tablet 1     No facility-administered encounter medications on file as of 12/13/2021.       Reviewed use of high risk medication in the elderly: yes  Reviewed for potential of harmful drug interactions in the elderly: yes    Follow  Up:  Return in about 6 months (around 6/13/2022) for Recheck.     There are no Patient Instructions on file for this visit.    An After Visit Summary and PPPS with all of these plans were given to the patient.

## 2021-12-14 ENCOUNTER — LAB (OUTPATIENT)
Dept: LAB | Facility: HOSPITAL | Age: 76
End: 2021-12-14

## 2021-12-14 ENCOUNTER — TELEPHONE (OUTPATIENT)
Dept: INTERNAL MEDICINE | Facility: CLINIC | Age: 76
End: 2021-12-14

## 2021-12-14 DIAGNOSIS — R73.01 IMPAIRED FASTING GLUCOSE: ICD-10-CM

## 2021-12-14 DIAGNOSIS — I10 BENIGN ESSENTIAL HYPERTENSION: ICD-10-CM

## 2021-12-14 DIAGNOSIS — R42 DIZZINESS: ICD-10-CM

## 2021-12-14 DIAGNOSIS — E78.5 HYPERLIPIDEMIA LDL GOAL <100: ICD-10-CM

## 2021-12-14 DIAGNOSIS — E55.9 VITAMIN D DEFICIENCY: ICD-10-CM

## 2021-12-14 DIAGNOSIS — R25.2 CRAMPS OF LEFT LOWER EXTREMITY: ICD-10-CM

## 2021-12-14 DIAGNOSIS — R41.3 MEMORY LOSS: ICD-10-CM

## 2021-12-14 LAB
25(OH)D3 SERPL-MCNC: 21.3 NG/ML
ALBUMIN SERPL-MCNC: 4.7 G/DL (ref 3.5–5.2)
ALBUMIN UR-MCNC: 2.1 MG/DL
ALBUMIN/GLOB SERPL: 1.7 G/DL
ALP SERPL-CCNC: 80 U/L (ref 39–117)
ALT SERPL W P-5'-P-CCNC: 16 U/L (ref 1–33)
ANION GAP SERPL CALCULATED.3IONS-SCNC: 16 MMOL/L (ref 5–15)
AST SERPL-CCNC: 31 U/L (ref 1–32)
BASOPHILS # BLD AUTO: 0.05 10*3/MM3 (ref 0–0.2)
BASOPHILS NFR BLD AUTO: 0.9 % (ref 0–1.5)
BILIRUB SERPL-MCNC: 0.6 MG/DL (ref 0–1.2)
BUN SERPL-MCNC: 14 MG/DL (ref 8–23)
BUN/CREAT SERPL: 19.2 (ref 7–25)
CALCIUM SPEC-SCNC: 10.1 MG/DL (ref 8.6–10.5)
CHLORIDE SERPL-SCNC: 102 MMOL/L (ref 98–107)
CHOLEST SERPL-MCNC: 214 MG/DL (ref 0–200)
CO2 SERPL-SCNC: 26 MMOL/L (ref 22–29)
CREAT SERPL-MCNC: 0.73 MG/DL (ref 0.57–1)
CREAT UR-MCNC: 276.5 MG/DL
CRP SERPL-MCNC: 0.28 MG/DL (ref 0.01–0.5)
DEPRECATED RDW RBC AUTO: 41.6 FL (ref 37–54)
EOSINOPHIL # BLD AUTO: 0.15 10*3/MM3 (ref 0–0.4)
EOSINOPHIL NFR BLD AUTO: 2.8 % (ref 0.3–6.2)
ERYTHROCYTE [DISTWIDTH] IN BLOOD BY AUTOMATED COUNT: 11.8 % (ref 12.3–15.4)
GFR SERPL CREATININE-BSD FRML MDRD: 78 ML/MIN/1.73
GLOBULIN UR ELPH-MCNC: 2.7 GM/DL
GLUCOSE SERPL-MCNC: 87 MG/DL (ref 65–99)
HBA1C MFR BLD: 5.35 % (ref 4.8–5.6)
HCT VFR BLD AUTO: 39.2 % (ref 34–46.6)
HDLC SERPL-MCNC: 125 MG/DL (ref 40–60)
HGB BLD-MCNC: 13.2 G/DL (ref 12–15.9)
IMM GRANULOCYTES # BLD AUTO: 0.02 10*3/MM3 (ref 0–0.05)
IMM GRANULOCYTES NFR BLD AUTO: 0.4 % (ref 0–0.5)
LDLC SERPL CALC-MCNC: 79 MG/DL (ref 0–100)
LDLC/HDLC SERPL: 0.63 {RATIO}
LYMPHOCYTES # BLD AUTO: 2.09 10*3/MM3 (ref 0.7–3.1)
LYMPHOCYTES NFR BLD AUTO: 39.5 % (ref 19.6–45.3)
MAGNESIUM SERPL-MCNC: 2.1 MG/DL (ref 1.6–2.4)
MCH RBC QN AUTO: 32.8 PG (ref 26.6–33)
MCHC RBC AUTO-ENTMCNC: 33.7 G/DL (ref 31.5–35.7)
MCV RBC AUTO: 97.3 FL (ref 79–97)
MICROALBUMIN/CREAT UR: 7.6 MG/G
MONOCYTES # BLD AUTO: 0.54 10*3/MM3 (ref 0.1–0.9)
MONOCYTES NFR BLD AUTO: 10.2 % (ref 5–12)
NEUTROPHILS NFR BLD AUTO: 2.44 10*3/MM3 (ref 1.7–7)
NEUTROPHILS NFR BLD AUTO: 46.2 % (ref 42.7–76)
NRBC BLD AUTO-RTO: 0 /100 WBC (ref 0–0.2)
PLATELET # BLD AUTO: 299 10*3/MM3 (ref 140–450)
PMV BLD AUTO: 9.7 FL (ref 6–12)
POTASSIUM SERPL-SCNC: 5 MMOL/L (ref 3.5–5.2)
PROT SERPL-MCNC: 7.4 G/DL (ref 6–8.5)
RBC # BLD AUTO: 4.03 10*6/MM3 (ref 3.77–5.28)
SODIUM SERPL-SCNC: 144 MMOL/L (ref 136–145)
T4 FREE SERPL-MCNC: 1.02 NG/DL (ref 0.93–1.7)
TRIGL SERPL-MCNC: 52 MG/DL (ref 0–150)
TSH SERPL DL<=0.05 MIU/L-ACNC: 10 UIU/ML (ref 0.27–4.2)
VIT B12 BLD-MCNC: 244 PG/ML (ref 211–946)
VLDLC SERPL-MCNC: 10 MG/DL (ref 5–40)
WBC NRBC COR # BLD: 5.29 10*3/MM3 (ref 3.4–10.8)

## 2021-12-14 PROCEDURE — 84439 ASSAY OF FREE THYROXINE: CPT

## 2021-12-14 PROCEDURE — 83036 HEMOGLOBIN GLYCOSYLATED A1C: CPT

## 2021-12-14 PROCEDURE — 83735 ASSAY OF MAGNESIUM: CPT

## 2021-12-14 PROCEDURE — 82043 UR ALBUMIN QUANTITATIVE: CPT

## 2021-12-14 PROCEDURE — 82570 ASSAY OF URINE CREATININE: CPT

## 2021-12-14 PROCEDURE — 80061 LIPID PANEL: CPT

## 2021-12-14 PROCEDURE — 82306 VITAMIN D 25 HYDROXY: CPT

## 2021-12-14 PROCEDURE — 84443 ASSAY THYROID STIM HORMONE: CPT

## 2021-12-14 PROCEDURE — 85025 COMPLETE CBC W/AUTO DIFF WBC: CPT

## 2021-12-14 PROCEDURE — 86141 C-REACTIVE PROTEIN HS: CPT

## 2021-12-14 PROCEDURE — 80053 COMPREHEN METABOLIC PANEL: CPT

## 2021-12-14 PROCEDURE — 82607 VITAMIN B-12: CPT

## 2021-12-14 NOTE — TELEPHONE ENCOUNTER
Caller: Sandy Brownlee    Relationship: Self    Best call back number: [phone]    What form or medical record are you requesting: COPY OF LAB RESULTS FROM TODAY    Who is requesting this form or medical record from you: PATIENT    How would you like to receive the form or medical records (pick-up, mail, fax): PICKUP  If fax, what is the fax number:   If mail, what is the address:     If pick-up, provide patient with address and location details    Timeframe paperwork needed: ASAP    Additional notes:

## 2021-12-14 NOTE — TELEPHONE ENCOUNTER
Results from today's labs are not in yet. Spoke with patient and advised to wait a few days. Patient was looking on mychart at last years labs and thought the results were in.

## 2021-12-16 ENCOUNTER — TELEPHONE (OUTPATIENT)
Dept: INTERNAL MEDICINE | Facility: CLINIC | Age: 76
End: 2021-12-16

## 2021-12-16 DIAGNOSIS — E03.9 HYPOTHYROIDISM, UNSPECIFIED TYPE: Primary | ICD-10-CM

## 2021-12-16 RX ORDER — LEVOTHYROXINE SODIUM 0.03 MG/1
25 TABLET ORAL
Qty: 90 TABLET | Refills: 1 | Status: SHIPPED | OUTPATIENT
Start: 2021-12-16 | End: 2022-07-26

## 2021-12-16 NOTE — TELEPHONE ENCOUNTER
Caller: Sandy Brownlee    Relationship: Self    Best call back number: [phone]    What form or medical record are you requesting: LAB TEST RESULTS    Who is requesting this form or medical record from you: PATIENT     How would you like to receive the form or medical records (pick-up, mail, fax):    If fax, what is the fax number:   If mail, what is the address:   If pick-up, provide patient with address and location details    Timeframe paperwork needed: ASAP     Additional notes: PATIENT STATES THAT SHE CANNOT ACCESS TEST RESULTS ON [employer]

## 2021-12-16 NOTE — TELEPHONE ENCOUNTER
Lab letter read to pt and she wants the low dose thyroid medicine sent in. She does not need a return call.

## 2021-12-16 NOTE — TELEPHONE ENCOUNTER
Caller: Sandy Brwonlee    Relationship: Self    Best call back number: [phone]    What is the medical concern/diagnosis:     What specialty or service is being requested: ENDOCRINOLOGY     What is the provider, practice or medical service name: [facility]    What is the office location: [address] [place] [address]    What is the office phone number: [phone]    Any additional details:

## 2021-12-16 NOTE — TELEPHONE ENCOUNTER
Patient would like to have results to lab work - does not look like it has been properly reviewed - please advise

## 2021-12-17 ENCOUNTER — TRANSCRIBE ORDERS (OUTPATIENT)
Dept: ADMINISTRATIVE | Facility: HOSPITAL | Age: 76
End: 2021-12-17

## 2021-12-28 RX ORDER — ATORVASTATIN CALCIUM 10 MG/1
TABLET, FILM COATED ORAL
Qty: 90 TABLET | Refills: 1 | Status: SHIPPED | OUTPATIENT
Start: 2021-12-28 | End: 2022-08-15

## 2022-03-26 DIAGNOSIS — R41.3 MEMORY LOSS: ICD-10-CM

## 2022-03-28 RX ORDER — DONEPEZIL HYDROCHLORIDE 5 MG/1
5 TABLET, FILM COATED ORAL NIGHTLY
Qty: 90 TABLET | Refills: 1 | Status: SHIPPED | OUTPATIENT
Start: 2022-03-28 | End: 2022-12-12 | Stop reason: SDUPTHER

## 2022-05-16 RX ORDER — LISINOPRIL AND HYDROCHLOROTHIAZIDE 20; 12.5 MG/1; MG/1
TABLET ORAL
Qty: 30 TABLET | Refills: 5 | Status: SHIPPED | OUTPATIENT
Start: 2022-05-16 | End: 2022-12-12 | Stop reason: SDUPTHER

## 2022-07-26 RX ORDER — LEVOTHYROXINE SODIUM 0.03 MG/1
25 TABLET ORAL
Qty: 90 TABLET | Refills: 1 | Status: SHIPPED | OUTPATIENT
Start: 2022-07-26 | End: 2022-11-06

## 2022-07-26 NOTE — TELEPHONE ENCOUNTER
Rx Refill Note  Requested Prescriptions     Pending Prescriptions Disp Refills   • levothyroxine (SYNTHROID, LEVOTHROID) 25 MCG tablet [Pharmacy Med Name: LEVOTHYROXINE 0.025MG (25MCG) TAB] 90 tablet 1     Sig: TAKE 1 TABLET BY MOUTH EVERY MORNING      Last office visit with prescribing clinician: 12/13/2021      Next office visit with prescribing clinician: Visit date not found            Ronda Flores LPN  07/26/22, 16:49 EDT

## 2022-08-15 RX ORDER — ATORVASTATIN CALCIUM 10 MG/1
TABLET, FILM COATED ORAL
Qty: 90 TABLET | Refills: 1 | Status: SHIPPED | OUTPATIENT
Start: 2022-08-15 | End: 2022-12-12 | Stop reason: SDUPTHER

## 2022-08-25 ENCOUNTER — AMBULATORY SURGICAL CENTER (OUTPATIENT)
Dept: URBAN - METROPOLITAN AREA SURGERY 10 | Facility: SURGERY | Age: 77
End: 2022-08-25

## 2022-08-25 DIAGNOSIS — K64.1 SECOND DEGREE HEMORRHOIDS: ICD-10-CM

## 2022-08-25 DIAGNOSIS — K57.30 DIVERTICULOSIS OF LARGE INTESTINE WITHOUT PERFORATION OR ABS: ICD-10-CM

## 2022-08-25 DIAGNOSIS — Z12.11 ENCOUNTER FOR SCREENING FOR MALIGNANT NEOPLASM OF COLON: ICD-10-CM

## 2022-08-25 PROCEDURE — 45378 DIAGNOSTIC COLONOSCOPY: CPT | Mod: PT | Performed by: INTERNAL MEDICINE

## 2022-11-06 RX ORDER — LEVOTHYROXINE SODIUM 0.03 MG/1
25 TABLET ORAL
Qty: 90 TABLET | Refills: 1 | Status: SHIPPED | OUTPATIENT
Start: 2022-11-06 | End: 2022-12-12 | Stop reason: SDUPTHER

## 2022-11-06 NOTE — TELEPHONE ENCOUNTER
Rx Refill Note  Requested Prescriptions     Pending Prescriptions Disp Refills   • levothyroxine (SYNTHROID, LEVOTHROID) 25 MCG tablet [Pharmacy Med Name: LEVOTHYROXINE 0.025MG (25MCG) TAB] 90 tablet 1     Sig: TAKE 1 TABLET BY MOUTH EVERY MORNING      Last office visit with prescribing clinician: 12/13/2021      Next office visit with prescribing clinician: 5/4/2023            Kaelyn Mendez LPN  11/06/22, 11:52 EST

## 2022-11-23 ENCOUNTER — TELEPHONE (OUTPATIENT)
Dept: INTERNAL MEDICINE | Facility: CLINIC | Age: 77
End: 2022-11-23

## 2022-11-23 RX ORDER — ONDANSETRON 8 MG/1
8 TABLET, ORALLY DISINTEGRATING ORAL EVERY 8 HOURS PRN
Qty: 20 TABLET | Refills: 0 | Status: SHIPPED | OUTPATIENT
Start: 2022-11-23 | End: 2022-12-12

## 2022-11-24 NOTE — TELEPHONE ENCOUNTER
She has had viral syndrome with some cough and congestion and nausea for 5 days similar to what her  has except he is doing much better   And will take theraflu and ibuprofen and if worsens go to ER

## 2022-12-12 ENCOUNTER — OFFICE VISIT (OUTPATIENT)
Dept: INTERNAL MEDICINE | Facility: CLINIC | Age: 77
End: 2022-12-12

## 2022-12-12 VITALS
SYSTOLIC BLOOD PRESSURE: 118 MMHG | DIASTOLIC BLOOD PRESSURE: 60 MMHG | HEART RATE: 66 BPM | TEMPERATURE: 96.9 F | HEIGHT: 67 IN | WEIGHT: 130 LBS | BODY MASS INDEX: 20.4 KG/M2

## 2022-12-12 DIAGNOSIS — E83.39 LOW PHOSPHATE LEVELS: ICD-10-CM

## 2022-12-12 DIAGNOSIS — D64.9 ANEMIA, UNSPECIFIED TYPE: ICD-10-CM

## 2022-12-12 DIAGNOSIS — M62.81 GENERALIZED MUSCLE WEAKNESS: ICD-10-CM

## 2022-12-12 DIAGNOSIS — E44.1 MILD PROTEIN-CALORIE MALNUTRITION: ICD-10-CM

## 2022-12-12 DIAGNOSIS — I73.9 PERIPHERAL VASCULAR DISEASE: ICD-10-CM

## 2022-12-12 DIAGNOSIS — J18.9 PNEUMONIA OF BOTH LOWER LOBES DUE TO INFECTIOUS ORGANISM: ICD-10-CM

## 2022-12-12 DIAGNOSIS — E55.9 VITAMIN D DEFICIENCY: ICD-10-CM

## 2022-12-12 DIAGNOSIS — E78.5 HYPERLIPIDEMIA LDL GOAL <100: ICD-10-CM

## 2022-12-12 DIAGNOSIS — R41.3 MEMORY LOSS: ICD-10-CM

## 2022-12-12 DIAGNOSIS — J96.01 ACUTE RESPIRATORY FAILURE WITH HYPOXIA: Primary | ICD-10-CM

## 2022-12-12 DIAGNOSIS — I10 BENIGN ESSENTIAL HYPERTENSION: ICD-10-CM

## 2022-12-12 PROCEDURE — 1111F DSCHRG MED/CURRENT MED MERGE: CPT | Performed by: INTERNAL MEDICINE

## 2022-12-12 PROCEDURE — 99496 TRANSJ CARE MGMT HIGH F2F 7D: CPT | Performed by: INTERNAL MEDICINE

## 2022-12-12 RX ORDER — LISINOPRIL AND HYDROCHLOROTHIAZIDE 20; 12.5 MG/1; MG/1
1 TABLET ORAL DAILY
Qty: 90 TABLET | Refills: 1 | Status: SHIPPED | OUTPATIENT
Start: 2022-12-12

## 2022-12-12 RX ORDER — LEVOTHYROXINE SODIUM 0.03 MG/1
25 TABLET ORAL
Qty: 90 TABLET | Refills: 1 | Status: SHIPPED | OUTPATIENT
Start: 2022-12-12 | End: 2023-01-04

## 2022-12-12 RX ORDER — ATORVASTATIN CALCIUM 10 MG/1
10 TABLET, FILM COATED ORAL
Qty: 90 TABLET | Refills: 1 | Status: SHIPPED | OUTPATIENT
Start: 2022-12-12 | End: 2022-12-13 | Stop reason: SDUPTHER

## 2022-12-12 RX ORDER — DONEPEZIL HYDROCHLORIDE 5 MG/1
5 TABLET, FILM COATED ORAL NIGHTLY
Qty: 90 TABLET | Refills: 1 | Status: SHIPPED | OUTPATIENT
Start: 2022-12-12

## 2022-12-12 NOTE — PROGRESS NOTES
Chief Complaint   Patient presents with   • Pneumonia   • Hospital Follow Up Visit     Counseling was given to patient and family for the following topics: diagnostic results, instructions for management, risk factor reductions, prognosis, patient and family education, impressions, risks and benefits of treatment options and importance of treatment compliance . Total time of the encounter was 70 minutes    History of Present Illness  76 y.o. female presents for follow up from [facility] from 11/272022 to 12/2/2022 for respiratory failure. She was in [facility] from 12/2 to 12/10/2022. She has weakness and worse memory loss. Her breathing is improved but was short of breath with walking yesterday. She had acute issue of malnutrition and low albumin.     Review of Systems   Constitutional: Positive for fatigue. Negative for chills, diaphoresis and fever.   Respiratory: Positive for cough and shortness of breath.    Cardiovascular: Negative for chest pain and palpitations.   Gastrointestinal: Negative for abdominal pain.   Musculoskeletal: Positive for gait problem.   Neurological: Positive for weakness.   Psychiatric/Behavioral: Positive for decreased concentration. Negative for dysphoric mood.     .    PMSFH:  The following portions of the patient's history were reviewed and updated as appropriate: allergies, current medications, past family history, past medical history, past social history, past surgical history and problem list.      Current Outpatient Medications:   •  aspirin (aspirin) 81 MG EC tablet, Take 1 tablet by mouth Daily., Disp:  , Rfl:   •  atorvastatin (LIPITOR) 10 MG tablet, Take 1 tablet by mouth every night at bedtime., Disp: 90 tablet, Rfl: 1  •  donepezil (ARICEPT) 5 MG tablet, Take 1 tablet by mouth Every Night., Disp: 90 tablet, Rfl: 1  •  levothyroxine (SYNTHROID, LEVOTHROID) 25 MCG tablet, Take 1 tablet by mouth Every Morning., Disp: 90 tablet, Rfl: 1  •   "lisinopril-hydrochlorothiazide (PRINZIDE,ZESTORETIC) 20-12.5 MG per tablet, Take 1 tablet by mouth Daily., Disp: 90 tablet, Rfl: 1  •  psyllium (KONSYL) 28.3 % pack pack, Take 1 packet by mouth Daily., Disp: , Rfl:   •  multivitamin with minerals tablet tablet, Take 1 tablet by mouth Daily., Disp: , Rfl:     VITALS:  /60   Pulse 66   Temp 96.9 °F (36.1 °C)   Ht 170.2 cm (67.01\")   Wt 59 kg (130 lb)   BMI 20.36 kg/m²     Physical Exam  Constitutional:       Appearance: Normal appearance.   HENT:      Right Ear: Tympanic membrane and ear canal normal.      Left Ear: Tympanic membrane and ear canal normal.   Eyes:      Conjunctiva/sclera: Conjunctivae normal.   Neck:      Vascular: No carotid bruit.   Cardiovascular:      Rate and Rhythm: Normal rate and regular rhythm.   Pulmonary:      Comments: Some mild rales of left lower lobe  Abdominal:      Palpations: Abdomen is soft.      Tenderness: There is no abdominal tenderness.   Neurological:      General: No focal deficit present.      Mental Status: She is alert and oriented to person, place, and time.      Comments: Slow get up and go    Psychiatric:         Mood and Affect: Mood normal.         Behavior: Behavior normal.       Current outpatient and discharge medications have been reconciled for the patient.  Reviewed by: Sylvester Roman MD  LABS:    CMP:  Lab Results   Component Value Date    BUN 14 12/14/2021    CREATININE 0.73 12/14/2021    EGFRIFNONA 78 12/14/2021     12/14/2021    K 5.0 12/14/2021     12/14/2021    CALCIUM 10.1 12/14/2021    ALBUMIN 4.70 12/14/2021    BILITOT 0.6 12/14/2021    ALKPHOS 80 12/14/2021    AST 31 12/14/2021    ALT 16 12/14/2021     CBC:  Lab Results   Component Value Date    WBC 5.29 12/14/2021    RBC 4.03 12/14/2021    HGB 13.2 12/14/2021    HCT 39.2 12/14/2021    MCV 97.3 (H) 12/14/2021    MCH 32.8 12/14/2021    MCHC 33.7 12/14/2021    RDW 11.8 (L) 12/14/2021     12/14/2021     TSH:  Lab Results "   Component Value Date    TSH 10.000 (H) 12/14/2021     HGBA1C(MOST RECENT):  Lab Results   Component Value Date    HGBA1C 5.35 12/14/2021     Procedures         ASSESSMENT/PLAN:  Problems Addressed this Visit        Cardiac and Vasculature    Benign essential hypertension     Hypertension is unchanged.  Continue current treatment regimen.  Regular aerobic exercise.  Blood pressure will be reassessed 3 weeks .         Relevant Medications    lisinopril-hydrochlorothiazide (PRINZIDE,ZESTORETIC) 20-12.5 MG per tablet    Other Relevant Orders    Comprehensive Metabolic Panel    Lipid Panel    Microalbumin / Creatinine Urine Ratio - Urine, Clean Catch    Hyperlipidemia LDL goal <100     Lipid abnormalities are unchanged.  Nutritional counseling was provided. and Pharmacotherapy as ordered.  Lipids will be reassessed 3 weeks .         Relevant Medications    atorvastatin (LIPITOR) 10 MG tablet    Other Relevant Orders    CBC & Differential    High Sensitivity CRP    TSH Rfx On Abnormal To Free T4    Peripheral vascular disease (HCC)     On aspirin and atorvastatin and denies calf pain with walking.             Endocrine and Metabolic    Vitamin D deficiency    Relevant Orders    Vitamin D,25-Hydroxy       Musculoskeletal and Injuries    Generalized muscle weakness     See Torres  tomorrow and PT later in week.             Neuro    Memory loss     Acute issue of worsening of memory loss with pneumonia. She will start aricept and went over benefits, and side effects.         Relevant Medications    donepezil (ARICEPT) 5 MG tablet    Other Relevant Orders    Vitamin B12       Pulmonary and Pneumonias    Acute respiratory failure with hypoxia (HCC) - Primary     76 y.o. female presents for [facility] [place] [place] from 11/[facility] to 12/2/2022 for respiratory failure. She was in [facility] from 12/2 to 12/10/2022. She has weakness and worse memory loss. Her breathing is improved but was short of breath with  walking yesterday. She had acute issue of malnutrition and low albumin. I have gone over her medications and her hospitalization and follows up with she and her  and they voiced understanding. She will go to rehab at PT and with her  [facility]. She declined OT or nursing. She is highly complex and has some mild rales in her left lower lobe. She is much improved and will do chest xray in 3 weeks. I explained that I did not want to give her more antibiotics unless strong reason as would further mess up her microbiome and byt doing that mess up her memory. I will see her in a few weeks to further reduce issues.          Pneumonia of both lower lobes due to infectious organism     76 y.o. female presents for follow up from [facility] from 11/272022 to 12/2/2022 for respiratory failure. She was in [facility] from 12/2 to 12/10/2022. She has weakness and worse memory loss. Her breathing is improved but was short of breath with walking yesterday. She had acute issue of malnutrition and low albumin. I have gone over her medications and her hospitalization and follows up with she and her  and they voiced understanding. She will go to rehab at PT and with her  [facility]. She declined OT or nursing. She is highly complex and has some mild rales in her left lower lobe. She is much improved and will do chest xray in 3 weeks. I explained that I did not want to give her more antibiotics unless strong reason as would further mess up her microbiome and byt doing that mess up her memory. I will see her in a few weeks to further reduce issues.          Relevant Orders    XR Chest PA & Lateral   Other Visit Diagnoses     Mild protein-calorie malnutrition (HCC)        Her albumin was quite low in hospital. Proceed with more labs    Relevant Orders    Magnesium    Vitamin D,25-Hydroxy    Prealbumin    Anemia, unspecified type        Acute issue and will proceed with labs.     Relevant Orders    CBC &  Differential    Folate    Vitamin B12    Iron Profile    Ferritin    Reticulocytes    Low phosphate levels        Follow labs.     Relevant Orders    Phosphorus      Diagnoses       Codes Comments    Acute respiratory failure with hypoxia (HCC)    -  Primary ICD-10-CM: J96.01  ICD-9-CM: 518.81     Pneumonia of both lower lobes due to infectious organism     ICD-10-CM: J18.9  ICD-9-CM: 486     Generalized muscle weakness     ICD-10-CM: M62.81  ICD-9-CM: 728.87     Memory loss     ICD-10-CM: R41.3  ICD-9-CM: 780.93     Peripheral vascular disease (HCC)     ICD-10-CM: I73.9  ICD-9-CM: 443.9     Hyperlipidemia LDL goal <100     ICD-10-CM: E78.5  ICD-9-CM: 272.4     Benign essential hypertension     ICD-10-CM: I10  ICD-9-CM: 401.1     Mild protein-calorie malnutrition (HCC)     ICD-10-CM: E44.1  ICD-9-CM: 263.1 Her albumin was quite low in hospital. Proceed with more labs    Anemia, unspecified type     ICD-10-CM: D64.9  ICD-9-CM: 285.9 Acute issue and will proceed with labs.     Low phosphate levels     ICD-10-CM: E83.39  ICD-9-CM: 275.3 Follow labs.     Vitamin D deficiency     ICD-10-CM: E55.9  ICD-9-CM: 268.9           FOLLOW-UP:  Return in about 23 days (around 1/4/2023).      Electronically signed by:    Sylvester Roman MD

## 2022-12-13 DIAGNOSIS — R41.3 MEMORY LOSS: ICD-10-CM

## 2022-12-13 RX ORDER — ATORVASTATIN CALCIUM 10 MG/1
10 TABLET, FILM COATED ORAL
Qty: 90 TABLET | Refills: 1 | Status: SHIPPED | OUTPATIENT
Start: 2022-12-13

## 2022-12-13 NOTE — TELEPHONE ENCOUNTER
Caller: Sandy Brownlee JOSH    Relationship: Self    Best call back number:    [phone]          Requested Prescriptions:   Requested Prescriptions     Pending Prescriptions Disp Refills   • atorvastatin (LIPITOR) 10 MG tablet 90 tablet 1     Sig: Take 1 tablet by mouth every night at bedtime.        Pharmacy where request should be sent: [id number] - [place], [place] - [address] AT [facility] [address]  [address] [facility] [facility] [phone] [facility] [phone]      Additional details provided by patient: PATIENT WAS HOSPITALIZED OUTSIDE OF [place], VERY ILL, WHEN SHE WENT IN SHE HAD HER MEDICATIONS,WHEN SHE DISCHARGED SHE COULD FIND HER MEDICATIONS. THAT IS WHY SHE IS ASKING FOR A REFILL. PLEASE PRIOR AUTHORIZE SO INSURANCE WILL PAY.     Does the patient have less than a 3 day supply:  [x] Yes  [] No    Would you like a call back once the refill request has been completed: [] Yes [x] No    If the office needs to give you a call back, can they leave a voicemail: [x] Yes [] No    Ml Tovar Rep   12/13/22 12:41 EST

## 2022-12-13 NOTE — ASSESSMENT & PLAN NOTE
Acute issue of worsening of memory loss with pneumonia. She will start aricept and went over benefits, and side effects.

## 2022-12-13 NOTE — ASSESSMENT & PLAN NOTE
Hypertension is unchanged.  Continue current treatment regimen.  Regular aerobic exercise.  Blood pressure will be reassessed 3 weeks .

## 2022-12-13 NOTE — ASSESSMENT & PLAN NOTE
76 y.o. female presents for follow up from [facility] from 11/272022 to 12/2/2022 for respiratory failure. She was in [facility] from 12/2 to 12/10/2022. She has weakness and worse memory loss. Her breathing is improved but was short of breath with walking yesterday. She had acute issue of malnutrition and low albumin. I have gone over her medications and her hospitalization and follows up with she and her  and they voiced understanding. She will go to rehab at  and with her  [facility]. She declined OT or nursing. She is highly complex and has some mild rales in her left lower lobe. She is much improved and will do chest xray in 3 weeks. I explained that I did not want to give her more antibiotics unless strong reason as would further mess up her microbiome and byt doing that mess up her memory. I will see her in a few weeks to further reduce issues.

## 2022-12-13 NOTE — ASSESSMENT & PLAN NOTE
Lipid abnormalities are unchanged.  Nutritional counseling was provided. and Pharmacotherapy as ordered.  Lipids will be reassessed 3 weeks .

## 2022-12-13 NOTE — ASSESSMENT & PLAN NOTE
76 y.o. female presents for follow up from [facility] from 11/272022 to 12/2/2022 for respiratory failure. She was in [facility] from 12/2 to 12/10/2022. She has weakness and worse memory loss. Her breathing is improved but was short of breath with walking yesterday. She had acute issue of malnutrition and low albumin. I have gone over her medications and her hospitalization and follows up with she and her  and they voiced understanding. She will go to rehab at  and with her  [facility]. She declined OT or nursing. She is highly complex and has some mild rales in her left lower lobe. She is much improved and will do chest xray in 3 weeks. I explained that I did not want to give her more antibiotics unless strong reason as would further mess up her microbiome and byt doing that mess up her memory. I will see her in a few weeks to further reduce issues.

## 2022-12-29 ENCOUNTER — LAB (OUTPATIENT)
Dept: LAB | Facility: HOSPITAL | Age: 77
End: 2022-12-29
Payer: MEDICARE

## 2022-12-29 DIAGNOSIS — E83.39 LOW PHOSPHATE LEVELS: ICD-10-CM

## 2022-12-29 DIAGNOSIS — I10 BENIGN ESSENTIAL HYPERTENSION: ICD-10-CM

## 2022-12-29 DIAGNOSIS — E78.5 HYPERLIPIDEMIA LDL GOAL <100: ICD-10-CM

## 2022-12-29 DIAGNOSIS — E55.9 VITAMIN D DEFICIENCY: ICD-10-CM

## 2022-12-29 DIAGNOSIS — D64.9 ANEMIA, UNSPECIFIED TYPE: ICD-10-CM

## 2022-12-29 DIAGNOSIS — E44.1 MILD PROTEIN-CALORIE MALNUTRITION: ICD-10-CM

## 2022-12-29 DIAGNOSIS — R41.3 MEMORY LOSS: ICD-10-CM

## 2022-12-29 LAB
25(OH)D3 SERPL-MCNC: 20.6 NG/ML (ref 30–100)
ALBUMIN SERPL-MCNC: 4.1 G/DL (ref 3.5–5.2)
ALBUMIN/GLOB SERPL: 1.5 G/DL
ALP SERPL-CCNC: 90 U/L (ref 39–117)
ALT SERPL W P-5'-P-CCNC: 11 U/L (ref 1–33)
ANION GAP SERPL CALCULATED.3IONS-SCNC: 12 MMOL/L (ref 5–15)
AST SERPL-CCNC: 21 U/L (ref 1–32)
BASOPHILS # BLD AUTO: 0.09 10*3/MM3 (ref 0–0.2)
BASOPHILS NFR BLD AUTO: 1.4 % (ref 0–1.5)
BILIRUB SERPL-MCNC: 0.5 MG/DL (ref 0–1.2)
BUN SERPL-MCNC: 12 MG/DL (ref 8–23)
BUN/CREAT SERPL: 15 (ref 7–25)
CALCIUM SPEC-SCNC: 9.7 MG/DL (ref 8.6–10.5)
CHLORIDE SERPL-SCNC: 102 MMOL/L (ref 98–107)
CHOLEST SERPL-MCNC: 204 MG/DL (ref 0–200)
CO2 SERPL-SCNC: 27 MMOL/L (ref 22–29)
CREAT SERPL-MCNC: 0.8 MG/DL (ref 0.57–1)
CRP SERPL-MCNC: 0.19 MG/DL (ref 0.01–0.5)
DEPRECATED RDW RBC AUTO: 47.7 FL (ref 37–54)
EGFRCR SERPLBLD CKD-EPI 2021: 76 ML/MIN/1.73
EOSINOPHIL # BLD AUTO: 0.28 10*3/MM3 (ref 0–0.4)
EOSINOPHIL NFR BLD AUTO: 4.5 % (ref 0.3–6.2)
ERYTHROCYTE [DISTWIDTH] IN BLOOD BY AUTOMATED COUNT: 13.2 % (ref 12.3–15.4)
FERRITIN SERPL-MCNC: 172 NG/ML (ref 13–150)
FOLATE SERPL-MCNC: 4.99 NG/ML (ref 4.78–24.2)
GLOBULIN UR ELPH-MCNC: 2.8 GM/DL
GLUCOSE SERPL-MCNC: 115 MG/DL (ref 65–99)
HCT VFR BLD AUTO: 38 % (ref 34–46.6)
HDLC SERPL-MCNC: 83 MG/DL (ref 40–60)
HGB BLD-MCNC: 12.5 G/DL (ref 12–15.9)
IMM GRANULOCYTES # BLD AUTO: 0.01 10*3/MM3 (ref 0–0.05)
IMM GRANULOCYTES NFR BLD AUTO: 0.2 % (ref 0–0.5)
IRON 24H UR-MRATE: 97 MCG/DL (ref 37–145)
IRON SATN MFR SERPL: 23 % (ref 20–50)
LDLC SERPL CALC-MCNC: 104 MG/DL (ref 0–100)
LDLC/HDLC SERPL: 1.22 {RATIO}
LYMPHOCYTES # BLD AUTO: 2.42 10*3/MM3 (ref 0.7–3.1)
LYMPHOCYTES NFR BLD AUTO: 38.9 % (ref 19.6–45.3)
MAGNESIUM SERPL-MCNC: 2.3 MG/DL (ref 1.6–2.4)
MCH RBC QN AUTO: 32.6 PG (ref 26.6–33)
MCHC RBC AUTO-ENTMCNC: 32.9 G/DL (ref 31.5–35.7)
MCV RBC AUTO: 99 FL (ref 79–97)
MONOCYTES # BLD AUTO: 0.54 10*3/MM3 (ref 0.1–0.9)
MONOCYTES NFR BLD AUTO: 8.7 % (ref 5–12)
NEUTROPHILS NFR BLD AUTO: 2.88 10*3/MM3 (ref 1.7–7)
NEUTROPHILS NFR BLD AUTO: 46.3 % (ref 42.7–76)
NRBC BLD AUTO-RTO: 0 /100 WBC (ref 0–0.2)
PHOSPHATE SERPL-MCNC: 4.1 MG/DL (ref 2.5–4.5)
PLATELET # BLD AUTO: 283 10*3/MM3 (ref 140–450)
PMV BLD AUTO: 9.1 FL (ref 6–12)
POTASSIUM SERPL-SCNC: 4.7 MMOL/L (ref 3.5–5.2)
PREALB SERPL-MCNC: 21.7 MG/DL (ref 20–40)
PROT SERPL-MCNC: 6.9 G/DL (ref 6–8.5)
RBC # BLD AUTO: 3.84 10*6/MM3 (ref 3.77–5.28)
RETICS # AUTO: 0.07 10*6/MM3 (ref 0.02–0.13)
RETICS/RBC NFR AUTO: 1.9 % (ref 0.7–1.9)
SODIUM SERPL-SCNC: 141 MMOL/L (ref 136–145)
T4 FREE SERPL-MCNC: 1.04 NG/DL (ref 0.93–1.7)
TIBC SERPL-MCNC: 425 MCG/DL (ref 298–536)
TRANSFERRIN SERPL-MCNC: 285 MG/DL (ref 200–360)
TRIGL SERPL-MCNC: 99 MG/DL (ref 0–150)
TSH SERPL DL<=0.05 MIU/L-ACNC: 5.83 UIU/ML (ref 0.27–4.2)
VIT B12 BLD-MCNC: 833 PG/ML (ref 211–946)
VLDLC SERPL-MCNC: 17 MG/DL (ref 5–40)
WBC NRBC COR # BLD: 6.22 10*3/MM3 (ref 3.4–10.8)

## 2022-12-29 PROCEDURE — 84466 ASSAY OF TRANSFERRIN: CPT

## 2022-12-29 PROCEDURE — 85025 COMPLETE CBC W/AUTO DIFF WBC: CPT

## 2022-12-29 PROCEDURE — 84134 ASSAY OF PREALBUMIN: CPT

## 2022-12-29 PROCEDURE — 82728 ASSAY OF FERRITIN: CPT

## 2022-12-29 PROCEDURE — 84443 ASSAY THYROID STIM HORMONE: CPT

## 2022-12-29 PROCEDURE — 82746 ASSAY OF FOLIC ACID SERUM: CPT

## 2022-12-29 PROCEDURE — 84100 ASSAY OF PHOSPHORUS: CPT

## 2022-12-29 PROCEDURE — 84439 ASSAY OF FREE THYROXINE: CPT

## 2022-12-29 PROCEDURE — 36415 COLL VENOUS BLD VENIPUNCTURE: CPT

## 2022-12-29 PROCEDURE — 86141 C-REACTIVE PROTEIN HS: CPT

## 2022-12-29 PROCEDURE — 85045 AUTOMATED RETICULOCYTE COUNT: CPT

## 2022-12-29 PROCEDURE — 82306 VITAMIN D 25 HYDROXY: CPT

## 2022-12-29 PROCEDURE — 82607 VITAMIN B-12: CPT

## 2022-12-29 PROCEDURE — 80061 LIPID PANEL: CPT

## 2022-12-29 PROCEDURE — 83735 ASSAY OF MAGNESIUM: CPT

## 2022-12-29 PROCEDURE — 83540 ASSAY OF IRON: CPT

## 2022-12-29 PROCEDURE — 80053 COMPREHEN METABOLIC PANEL: CPT

## 2022-12-31 DIAGNOSIS — E78.5 HYPERLIPIDEMIA LDL GOAL <100: Primary | ICD-10-CM

## 2022-12-31 DIAGNOSIS — R73.01 IMPAIRED FASTING GLUCOSE: ICD-10-CM

## 2023-01-04 ENCOUNTER — HOSPITAL ENCOUNTER (OUTPATIENT)
Dept: GENERAL RADIOLOGY | Facility: HOSPITAL | Age: 78
Discharge: HOME | End: 2023-01-04
Payer: MEDICARE

## 2023-01-04 ENCOUNTER — OFFICE VISIT (OUTPATIENT)
Dept: INTERNAL MEDICINE | Facility: CLINIC | Age: 78
End: 2023-01-04
Payer: MEDICARE

## 2023-01-04 ENCOUNTER — LAB (OUTPATIENT)
Dept: LAB | Facility: HOSPITAL | Age: 78
End: 2023-01-04
Payer: MEDICARE

## 2023-01-04 VITALS
SYSTOLIC BLOOD PRESSURE: 130 MMHG | TEMPERATURE: 98.2 F | WEIGHT: 136 LBS | BODY MASS INDEX: 21.35 KG/M2 | DIASTOLIC BLOOD PRESSURE: 70 MMHG | HEIGHT: 67 IN | HEART RATE: 74 BPM

## 2023-01-04 DIAGNOSIS — I73.9 PERIPHERAL VASCULAR DISEASE: ICD-10-CM

## 2023-01-04 DIAGNOSIS — I10 BENIGN ESSENTIAL HYPERTENSION: Primary | ICD-10-CM

## 2023-01-04 DIAGNOSIS — E03.9 ACQUIRED HYPOTHYROIDISM: ICD-10-CM

## 2023-01-04 DIAGNOSIS — E78.5 HYPERLIPIDEMIA LDL GOAL <100: ICD-10-CM

## 2023-01-04 DIAGNOSIS — J96.01 ACUTE RESPIRATORY FAILURE WITH HYPOXIA: ICD-10-CM

## 2023-01-04 DIAGNOSIS — E55.9 VITAMIN D DEFICIENCY: ICD-10-CM

## 2023-01-04 DIAGNOSIS — J18.9 PNEUMONIA OF BOTH LOWER LOBES DUE TO INFECTIOUS ORGANISM: ICD-10-CM

## 2023-01-04 DIAGNOSIS — E44.1 MILD PROTEIN-CALORIE MALNUTRITION: ICD-10-CM

## 2023-01-04 DIAGNOSIS — R73.01 IMPAIRED FASTING GLUCOSE: ICD-10-CM

## 2023-01-04 DIAGNOSIS — R79.89 ELEVATED HOMOCYSTEINE: ICD-10-CM

## 2023-01-04 PROCEDURE — 83090 ASSAY OF HOMOCYSTEINE: CPT

## 2023-01-04 PROCEDURE — 83036 HEMOGLOBIN GLYCOSYLATED A1C: CPT

## 2023-01-04 PROCEDURE — 99215 OFFICE O/P EST HI 40 MIN: CPT | Performed by: INTERNAL MEDICINE

## 2023-01-04 PROCEDURE — 71046 X-RAY EXAM CHEST 2 VIEWS: CPT

## 2023-01-04 RX ORDER — LEVOTHYROXINE SODIUM 0.05 MG/1
50 TABLET ORAL DAILY
Qty: 90 TABLET | Refills: 1 | Status: SHIPPED | OUTPATIENT
Start: 2023-01-04

## 2023-01-04 NOTE — ASSESSMENT & PLAN NOTE
Lipid abnormalities are unchanged.  Nutritional counseling was provided. and Pharmacotherapy as ordered.  Lipids will be reassessed in 6 months.

## 2023-01-04 NOTE — ASSESSMENT & PLAN NOTE
Hypertension is unchanged.  Continue current treatment regimen.  Regular aerobic exercise.  Blood pressure will be reassessed at the next regular appointment.

## 2023-01-04 NOTE — ASSESSMENT & PLAN NOTE
Mildly elevated blood sugar and will get HBA1c. Discussed decreasing bad carbohydrates, specifically sweets, breads, potatoes, corn and high caloric drinks (juices, sodas, sweet tea).  Also recommend increasing physical activity, ideally 150 minutes aerobic exercise weekly and resistance exercises 2-3x/week.

## 2023-01-04 NOTE — PROGRESS NOTES
Chief Complaint   Patient presents with   • Memory Loss   Counseling was given to patient and family for the following topics: diagnostic results, instructions for management, risk factor reductions, prognosis, patient and family education, impressions, risks and benefits of treatment options and importance of treatment compliance . Total time of the encounter was 45 minutes       History of Present Illness  77 y.o. female presents for follow up of hospital. Her cough is much improved but sometimes has some congestion and productive cough. Her breathing is overall ok. She has mild memory loss and has had some mild malnutrition since her hospitalization.     Review of Systems   Constitutional: Negative for chills and fever.   HENT: Positive for postnasal drip.    Respiratory: Positive for cough. Negative for shortness of breath and wheezing.    Cardiovascular: Negative for chest pain and palpitations.   Gastrointestinal: Negative for abdominal pain, nausea and vomiting.   Skin: Negative for rash.   Neurological: Negative for dizziness, light-headedness and headaches.   Psychiatric/Behavioral: Positive for decreased concentration. Negative for dysphoric mood. The patient is not nervous/anxious.    All other systems reviewed and are negative.    .    PMSFH:  The following portions of the patient's history were reviewed and updated as appropriate: allergies, current medications, past family history, past medical history, past social history, past surgical history and problem list.      Current Outpatient Medications:   •  aspirin (aspirin) 81 MG EC tablet, Take 1 tablet by mouth Daily., Disp:  , Rfl:   •  atorvastatin (LIPITOR) 10 MG tablet, Take 1 tablet by mouth every night at bedtime., Disp: 90 tablet, Rfl: 1  •  donepezil (ARICEPT) 5 MG tablet, Take 1 tablet by mouth Every Night., Disp: 90 tablet, Rfl: 1  •  lisinopril-hydrochlorothiazide (PRINZIDE,ZESTORETIC) 20-12.5 MG per tablet, Take 1 tablet by mouth Daily.,  Disp: 90 tablet, Rfl: 1  •  multivitamin with minerals tablet tablet, Take 1 tablet by mouth Daily., Disp: , Rfl:   •  psyllium (KONSYL) 28.3 % pack pack, Take 1 packet by mouth Daily., Disp: , Rfl:   •  l-methylfolate-algae-B6-B12 (METANX) 3-90.314-2-35 MG capsule capsule, Take 1 capsule by mouth 2 (Two) Times a Day., Disp: 180 capsule, Rfl: 3  •  levothyroxine (Synthroid) 50 MCG tablet, Take 1 tablet by mouth Daily., Disp: 90 tablet, Rfl: 1    VITALS:  /70   Pulse 74   Temp 98.2 °F (36.8 °C)   Ht 170.2 cm (67.01\")   Wt 61.7 kg (136 lb)   BMI 21.30 kg/m²     Physical Exam  Vitals and nursing note reviewed.   Constitutional:       Appearance: Normal appearance. She is well-developed.   HENT:      Head: Normocephalic.   Eyes:      Extraocular Movements: Extraocular movements intact.      Conjunctiva/sclera: Conjunctivae normal.   Cardiovascular:      Rate and Rhythm: Normal rate and regular rhythm.   Pulmonary:      Effort: Pulmonary effort is normal. No respiratory distress.      Breath sounds: Normal breath sounds.   Abdominal:      General: Bowel sounds are normal.      Palpations: Abdomen is soft.      Tenderness: There is no abdominal tenderness.   Skin:     General: Skin is warm and dry.   Neurological:      General: No focal deficit present.      Mental Status: She is alert and oriented to person, place, and time.   Psychiatric:         Mood and Affect: Mood normal.         Behavior: Behavior normal.         LABS:    CMP:  Lab Results   Component Value Date    BUN 12 12/29/2022    CREATININE 0.80 12/29/2022    EGFRIFNONA 78 12/14/2021     12/29/2022    K 4.7 12/29/2022     12/29/2022    CALCIUM 9.7 12/29/2022    ALBUMIN 4.1 12/29/2022    BILITOT 0.5 12/29/2022    ALKPHOS 90 12/29/2022    AST 21 12/29/2022    ALT 11 12/29/2022     CBC:  Lab Results   Component Value Date    WBC 6.22 12/29/2022    RBC 3.84 12/29/2022    HGB 12.5 12/29/2022    HCT 38.0 12/29/2022    MCV 99.0 (H) 12/29/2022     MCH 32.6 12/29/2022    MCHC 32.9 12/29/2022    RDW 13.2 12/29/2022     12/29/2022     TSH:  Lab Results   Component Value Date    TSH 5.830 (H) 12/29/2022     HGBA1C(MOST RECENT):  Lab Results   Component Value Date    HGBA1C 5.20 01/04/2023     Procedures         ASSESSMENT/PLAN:  Problems Addressed this Visit        Cardiac and Vasculature    Benign essential hypertension - Primary     Hypertension is unchanged.  Continue current treatment regimen.  Regular aerobic exercise.  Blood pressure will be reassessed at the next regular appointment.         Hyperlipidemia LDL goal <100     Lipid abnormalities are unchanged.  Nutritional counseling was provided. and Pharmacotherapy as ordered.  Lipids will be reassessed in 6 months.         Relevant Orders    Homocysteine (Completed)    Peripheral vascular disease (HCC)     She is on lipitor and aspirin             Endocrine and Metabolic    Hypothyroidism     Acute issue of under replaced. Increase dose synthroid 25 to 50 mcg per day         Relevant Medications    levothyroxine (Synthroid) 50 MCG tablet    Impaired fasting glucose     Mildly elevated blood sugar and will get HBA1c. Discussed decreasing bad carbohydrates, specifically sweets, breads, potatoes, corn and high caloric drinks (juices, sodas, sweet tea).  Also recommend increasing physical activity, ideally 150 minutes aerobic exercise weekly and resistance exercises 2-3x/week.         Relevant Orders    Hemoglobin A1c (Completed)    Vitamin D deficiency     Acute issue of low and suggest 4000 IU daily for 2 months and then 2000 IU per day.            Pulmonary and Pneumonias    Acute respiratory failure with hypoxia (HCC)     She is much better and will get follow up chest xray today.             Symptoms and Signs    Elevated homocysteine    Relevant Medications    l-methylfolate-algae-B6-B12 (METANX) 3-90.314-2-35 MG capsule capsule   Other Visit Diagnoses     Mild protein-calorie malnutrition  (HCC)        Her prelbumin was borderline low.       Diagnoses       Codes Comments    Benign essential hypertension    -  Primary ICD-10-CM: I10  ICD-9-CM: 401.1     Impaired fasting glucose     ICD-10-CM: R73.01  ICD-9-CM: 790.21     Hyperlipidemia LDL goal <100     ICD-10-CM: E78.5  ICD-9-CM: 272.4     Vitamin D deficiency     ICD-10-CM: E55.9  ICD-9-CM: 268.9     Mild protein-calorie malnutrition (HCC)     ICD-10-CM: E44.1  ICD-9-CM: 263.1 Her prelbumin was borderline low.     Peripheral vascular disease (HCC)     ICD-10-CM: I73.9  ICD-9-CM: 443.9     Acute respiratory failure with hypoxia (HCC)     ICD-10-CM: J96.01  ICD-9-CM: 518.81     Acquired hypothyroidism     ICD-10-CM: E03.9  ICD-9-CM: 244.9     Elevated homocysteine     ICD-10-CM: R79.89  ICD-9-CM: 790.6           FOLLOW-UP:  Return in about 4 months (around 5/4/2023) for Medicare Wellness, Labs this visit.      Electronically signed by:    Sylvester Roman MD

## 2023-01-05 PROBLEM — R79.89 ELEVATED HOMOCYSTEINE: Status: ACTIVE | Noted: 2023-01-05

## 2023-01-05 LAB
HBA1C MFR BLD: 5.2 % (ref 4.8–5.6)
HCYS SERPL-MCNC: 13.5 UMOL/L (ref 0–15)

## 2023-01-05 RX ORDER — L-METHYLFOLATE-ALGAE-VIT B12-B6 CAP 3-90.314-2-35 MG 3-90.314-2-35 MG
1 CAP ORAL 2 TIMES DAILY
Qty: 180 CAPSULE | Refills: 3 | Status: SHIPPED | OUTPATIENT
Start: 2023-01-05

## 2023-01-27 ENCOUNTER — TELEPHONE (OUTPATIENT)
Dept: INTERNAL MEDICINE | Facility: CLINIC | Age: 78
End: 2023-01-27
Payer: MEDICARE

## 2023-01-27 NOTE — TELEPHONE ENCOUNTER
PATIENT HAS CALLED STATING SHE HAS QUESTIONS FOR DR. ALBARADO ON A NEW MEDICATION SHE HAS BEEN PRESCRIBED. PATIENT HAS SOME CONCERNS ABOUT SIDE EFFECTS. PATIENT IS REQUESTING A CALL BACK AFTER 5 O'CLOCK TODAY.    CALL BACK NUMBER [phone]

## 2023-01-27 NOTE — TELEPHONE ENCOUNTER
I called her and reassured her that the metanx is a healthy vitamin and unlikely to have side effects

## 2023-04-24 ENCOUNTER — TELEPHONE (OUTPATIENT)
Dept: INTERNAL MEDICINE | Facility: CLINIC | Age: 78
End: 2023-04-24

## 2023-04-24 DIAGNOSIS — I10 BENIGN ESSENTIAL HYPERTENSION: Primary | ICD-10-CM

## 2023-04-24 DIAGNOSIS — R73.01 IMPAIRED FASTING GLUCOSE: ICD-10-CM

## 2023-04-24 DIAGNOSIS — E78.5 HYPERLIPIDEMIA LDL GOAL <100: ICD-10-CM

## 2023-04-24 DIAGNOSIS — E55.9 VITAMIN D DEFICIENCY: ICD-10-CM

## 2023-04-24 DIAGNOSIS — R41.3 MEMORY LOSS: ICD-10-CM

## 2023-04-24 NOTE — TELEPHONE ENCOUNTER
"Caller: Sandy Brownlee \"Agueda\"    Relationship: Self    Best call back number: [phone]    What orders are you requesting (i.e. lab or imaging): LABS     In what timeframe would the patient need to come in: ASAP    Where [facility] your [facility]imaging services[facility] [address]     Additional notes: PATIENT WANTS TO COMPLETE ANNUAL LABS   "

## 2023-04-25 ENCOUNTER — LAB (OUTPATIENT)
Dept: LAB | Facility: HOSPITAL | Age: 78
End: 2023-04-25
Payer: MEDICARE

## 2023-04-25 DIAGNOSIS — E78.5 HYPERLIPIDEMIA LDL GOAL <100: ICD-10-CM

## 2023-04-25 DIAGNOSIS — I10 BENIGN ESSENTIAL HYPERTENSION: ICD-10-CM

## 2023-04-25 DIAGNOSIS — R41.3 MEMORY LOSS: ICD-10-CM

## 2023-04-25 DIAGNOSIS — R73.01 IMPAIRED FASTING GLUCOSE: ICD-10-CM

## 2023-04-25 DIAGNOSIS — E55.9 VITAMIN D DEFICIENCY: ICD-10-CM

## 2023-04-25 LAB
25(OH)D3 SERPL-MCNC: 71.5 NG/ML (ref 30–100)
ALBUMIN SERPL-MCNC: 4.6 G/DL (ref 3.5–5.2)
ALBUMIN/GLOB SERPL: 1.8 G/DL
ALP SERPL-CCNC: 69 U/L (ref 39–117)
ALT SERPL W P-5'-P-CCNC: 15 U/L (ref 1–33)
ANION GAP SERPL CALCULATED.3IONS-SCNC: 11.4 MMOL/L (ref 5–15)
AST SERPL-CCNC: 21 U/L (ref 1–32)
BASOPHILS # BLD AUTO: 0.04 10*3/MM3 (ref 0–0.2)
BASOPHILS NFR BLD AUTO: 0.9 % (ref 0–1.5)
BILIRUB SERPL-MCNC: 0.4 MG/DL (ref 0–1.2)
BUN SERPL-MCNC: 18 MG/DL (ref 8–23)
BUN/CREAT SERPL: 19.1 (ref 7–25)
CALCIUM SPEC-SCNC: 10.6 MG/DL (ref 8.6–10.5)
CHLORIDE SERPL-SCNC: 106 MMOL/L (ref 98–107)
CHOLEST SERPL-MCNC: 191 MG/DL (ref 0–200)
CO2 SERPL-SCNC: 26.6 MMOL/L (ref 22–29)
CREAT SERPL-MCNC: 0.94 MG/DL (ref 0.57–1)
CRP SERPL-MCNC: 0.13 MG/DL (ref 0.01–0.5)
DEPRECATED RDW RBC AUTO: 39.5 FL (ref 37–54)
EGFRCR SERPLBLD CKD-EPI 2021: 62.6 ML/MIN/1.73
EOSINOPHIL # BLD AUTO: 0.16 10*3/MM3 (ref 0–0.4)
EOSINOPHIL NFR BLD AUTO: 3.5 % (ref 0.3–6.2)
ERYTHROCYTE [DISTWIDTH] IN BLOOD BY AUTOMATED COUNT: 11.8 % (ref 12.3–15.4)
FOLATE SERPL-MCNC: >20 NG/ML (ref 4.78–24.2)
GLOBULIN UR ELPH-MCNC: 2.6 GM/DL
GLUCOSE SERPL-MCNC: 92 MG/DL (ref 65–99)
HBA1C MFR BLD: 5.9 % (ref 4.8–5.6)
HCT VFR BLD AUTO: 36.9 % (ref 34–46.6)
HCYS SERPL-MCNC: 8.4 UMOL/L (ref 0–15)
HDLC SERPL-MCNC: 100 MG/DL (ref 40–60)
HGB BLD-MCNC: 13 G/DL (ref 12–15.9)
IMM GRANULOCYTES # BLD AUTO: 0.01 10*3/MM3 (ref 0–0.05)
IMM GRANULOCYTES NFR BLD AUTO: 0.2 % (ref 0–0.5)
LDLC SERPL CALC-MCNC: 81 MG/DL (ref 0–100)
LDLC/HDLC SERPL: 0.81 {RATIO}
LYMPHOCYTES # BLD AUTO: 1.67 10*3/MM3 (ref 0.7–3.1)
LYMPHOCYTES NFR BLD AUTO: 36.8 % (ref 19.6–45.3)
MCH RBC QN AUTO: 32.4 PG (ref 26.6–33)
MCHC RBC AUTO-ENTMCNC: 35.2 G/DL (ref 31.5–35.7)
MCV RBC AUTO: 92 FL (ref 79–97)
MONOCYTES # BLD AUTO: 0.44 10*3/MM3 (ref 0.1–0.9)
MONOCYTES NFR BLD AUTO: 9.7 % (ref 5–12)
NEUTROPHILS NFR BLD AUTO: 2.22 10*3/MM3 (ref 1.7–7)
NEUTROPHILS NFR BLD AUTO: 48.9 % (ref 42.7–76)
NRBC BLD AUTO-RTO: 0 /100 WBC (ref 0–0.2)
PLATELET # BLD AUTO: 304 10*3/MM3 (ref 140–450)
PMV BLD AUTO: 9.5 FL (ref 6–12)
POTASSIUM SERPL-SCNC: 4.5 MMOL/L (ref 3.5–5.2)
PROT SERPL-MCNC: 7.2 G/DL (ref 6–8.5)
RBC # BLD AUTO: 4.01 10*6/MM3 (ref 3.77–5.28)
SODIUM SERPL-SCNC: 144 MMOL/L (ref 136–145)
TRIGL SERPL-MCNC: 51 MG/DL (ref 0–150)
TSH SERPL DL<=0.05 MIU/L-ACNC: 1.94 UIU/ML (ref 0.27–4.2)
VIT B12 BLD-MCNC: 1001 PG/ML (ref 211–946)
VLDLC SERPL-MCNC: 10 MG/DL (ref 5–40)
WBC NRBC COR # BLD: 4.54 10*3/MM3 (ref 3.4–10.8)

## 2023-04-25 PROCEDURE — 82306 VITAMIN D 25 HYDROXY: CPT

## 2023-04-25 PROCEDURE — 80053 COMPREHEN METABOLIC PANEL: CPT

## 2023-04-25 PROCEDURE — 85025 COMPLETE CBC W/AUTO DIFF WBC: CPT

## 2023-04-25 PROCEDURE — 80061 LIPID PANEL: CPT

## 2023-04-25 PROCEDURE — 83036 HEMOGLOBIN GLYCOSYLATED A1C: CPT

## 2023-04-25 PROCEDURE — 82607 VITAMIN B-12: CPT

## 2023-04-25 PROCEDURE — 84443 ASSAY THYROID STIM HORMONE: CPT

## 2023-04-25 PROCEDURE — 86141 C-REACTIVE PROTEIN HS: CPT

## 2023-04-25 PROCEDURE — 82746 ASSAY OF FOLIC ACID SERUM: CPT

## 2023-04-25 PROCEDURE — 83090 ASSAY OF HOMOCYSTEINE: CPT

## 2023-05-04 ENCOUNTER — OFFICE VISIT (OUTPATIENT)
Dept: INTERNAL MEDICINE | Facility: CLINIC | Age: 78
End: 2023-05-04
Payer: MEDICARE

## 2023-05-04 VITALS
HEIGHT: 67 IN | DIASTOLIC BLOOD PRESSURE: 58 MMHG | TEMPERATURE: 98.2 F | WEIGHT: 139 LBS | SYSTOLIC BLOOD PRESSURE: 104 MMHG | BODY MASS INDEX: 21.82 KG/M2 | HEART RATE: 88 BPM

## 2023-05-04 DIAGNOSIS — E03.9 ACQUIRED HYPOTHYROIDISM: ICD-10-CM

## 2023-05-04 DIAGNOSIS — R41.3 MEMORY LOSS: ICD-10-CM

## 2023-05-04 DIAGNOSIS — R79.89 ELEVATED HOMOCYSTEINE: ICD-10-CM

## 2023-05-04 DIAGNOSIS — I73.9 PERIPHERAL VASCULAR DISEASE: ICD-10-CM

## 2023-05-04 DIAGNOSIS — M25.551 PAIN OF BOTH HIP JOINTS: ICD-10-CM

## 2023-05-04 DIAGNOSIS — I10 BENIGN ESSENTIAL HYPERTENSION: ICD-10-CM

## 2023-05-04 DIAGNOSIS — E78.5 HYPERLIPIDEMIA LDL GOAL <100: ICD-10-CM

## 2023-05-04 DIAGNOSIS — Z00.00 MEDICARE ANNUAL WELLNESS VISIT, SUBSEQUENT: Primary | ICD-10-CM

## 2023-05-04 DIAGNOSIS — Z85.828 HISTORY OF SQUAMOUS CELL CARCINOMA OF SKIN: ICD-10-CM

## 2023-05-04 DIAGNOSIS — M79.10 MYALGIA: ICD-10-CM

## 2023-05-04 DIAGNOSIS — M25.552 PAIN OF BOTH HIP JOINTS: ICD-10-CM

## 2023-05-04 DIAGNOSIS — R73.01 IMPAIRED FASTING GLUCOSE: ICD-10-CM

## 2023-05-04 NOTE — ASSESSMENT & PLAN NOTE
Acute issue and will get labs and do exercises with [facility]   
Acute issue of elevated HBA1c and discussed resuming Konsyl and  decreasing bad carbohydrates, specifically sweets, breads, potatoes, corn and high caloric drinks (juices, sodas, sweet tea).  Also recommend increasing physical activity, ideally 150 minutes aerobic exercise weekly and resistance exercises 2-3x/week.  
Acute issue was low and then she recently reduce back to 50 mcg dose due to pelvic pain. Recheck labs later this month.   
Her mood is good overall. Good get up and go and good recall 3 of 3 and good clock. See Sirisha Partida for skin survey today and see dr Nava over the summer. Age-appropriate Counseling:  Discussed preventative medicine issues with patient including regular exercise, healthy diet, stress reduction, adequate sleep and recommended age-appropriate screening studies.  Immunizations reviewed.       
Hypertension is unchanged.  Continue current treatment regimen.  Regular aerobic exercise.  Blood pressure will be reassessed at the next regular appointment.  
Improved off aricept and doing better with diet and exercise and getting over her hospitalization and taking the metanx   
It has normalized on metanax   
Lipid abnormalities are improving with treatment.  Nutritional counseling was provided. and Pharmacotherapy as ordered.  Lipids will be reassessed in 1 year Add coenzyme q 10 300 mg per day .  
Resume baby aspirin with warm water and continue exercise and atorvastatin   
See Sirisha Partida today  
Samantha Kaplan

## 2023-05-04 NOTE — PROGRESS NOTES
QUICK REFERENCE INFORMATION:  The ABCs of the Annual Wellness Visit    Subsequent Medicare Wellness Visit    HEALTH RISK ASSESSMENT    1945    Recent Hospitalizations:  Recently treated at the following:  Other: hospital in [facility] .        Current Medical Providers:  Patient Care Team:  Sylvester Roman MD as PCP - General (Internal Medicine)  Sirisha Partida MD as Consulting Physician (Dermatology)  Daniel Pascal MD as Consulting Physician (Ophthalmology)        Smoking Status:  Social History     Tobacco Use   Smoking Status Never   Smokeless Tobacco Never       Alcohol Consumption:  Social History     Substance and Sexual Activity   Alcohol Use Yes   • Alcohol/week: 2.0 standard drinks   • Types: 2 Glasses of wine per week    Comment: nightly        Depression Screen:       2023     9:24 AM   PHQ-2/PHQ-9 Depression Screening   Little Interest or Pleasure in Doing Things 0-->not at all   Feeling Down, Depressed or Hopeless 0-->not at all   PHQ-9: Brief Depression Severity Measure Score 0       Health Habits and Functional and Cognitive Screenin/4/2023     9:23 AM   Functional & Cognitive Status   Do you have difficulty preparing food and eating? No   Do you have difficulty bathing yourself, getting dressed or grooming yourself? No   Do you have difficulty using the toilet? No   Do you have difficulty moving around from place to place? No   Do you have trouble with steps or getting out of a bed or a chair? No   Current Diet Well Balanced Diet   Dental Exam Up to date   Eye Exam Up to date   Current Exercises Include Walking   Do you need help using the phone?  No   Are you deaf or do you have serious difficulty hearing?  No   Do you need help with transportation? No   Do you need help shopping? No   Do you need help preparing meals?  No   Do you need help with housework?  No   Do you need help with laundry? No   Do you need help taking your medications? No   Do you need help  managing money? No   Do you ever drive or ride in a car without wearing a seat belt? No   Have you felt unusual stress, anger or loneliness in the last month? No   Who do you live with? Spouse   If you need help, do you have trouble finding someone available to you? No   Have you been bothered in the last four weeks by sexual problems? No   Do you have difficulty concentrating, remembering or making decisions? No       Fall Risk Screen:  CAROLYNADI Fall Risk Assessment was completed, and patient is at LOW risk for falls.Assessment completed on:5/4/2023    ACE III MINI        Does the patient have evidence of cognitive impairment? Yes    Aspirin use counseling: Start ASA 81 mg daily     Recent Lab Results:  CMP:  Lab Results   Component Value Date    BUN 18 04/25/2023    CREATININE 0.94 04/25/2023    EGFRIFNONA 78 12/14/2021    BCR 19.1 04/25/2023     04/25/2023    K 4.5 04/25/2023    CO2 26.6 04/25/2023    CALCIUM 10.6 (H) 04/25/2023    ALBUMIN 4.6 04/25/2023    BILITOT 0.4 04/25/2023    ALKPHOS 69 04/25/2023    AST 21 04/25/2023    ALT 15 04/25/2023     HbA1c:  Lab Results   Component Value Date    HGBA1C 5.90 (H) 04/25/2023    HGBA1C 5.20 01/04/2023     Microalbumin:  Lab Results   Component Value Date    MICROALBUR 2.1 12/14/2021     Lipid Panel  Lab Results   Component Value Date    CHOL 191 04/25/2023    TRIG 51 04/25/2023     (H) 04/25/2023    LDL 81 04/25/2023    AST 21 04/25/2023    ALT 15 04/25/2023       Visual Acuity:  No results found.    Age-appropriate Screening Schedule:  Refer to the list below for future screening recommendations based on patient's age, sex and/or medical conditions. Orders for these recommended tests are listed in the plan section. The patient has been provided with a written plan.    Health Maintenance   Topic Date Due   • ZOSTER VACCINE (3 of 3) 10/15/2019   • TDAP/TD VACCINES (2 - Td or Tdap) 03/22/2021   • COVID-19 Vaccine (4 - Booster for Moderna series) 10/10/2021    • ANNUAL WELLNESS VISIT  12/13/2022   • INFLUENZA VACCINE  08/01/2023   • LIPID PANEL  04/25/2024   • DXA SCAN  04/11/2025   • HEPATITIS C SCREENING  Completed   • Pneumococcal Vaccine 65+  Completed   • COLORECTAL CANCER SCREENING  Discontinued        Subjective   History of Present Illness    Sandy Brownlee is a 77 y.o. female who presents for a Subsequent Wellness Visit.She has had abnormal thyroid and blood sugar testing> She has also had some pelvic pain and muscle aches. It has improved on lower dose of thyroid medicine     CHRONIC CONDITIONS    The following portions of the patient's history were reviewed and updated as appropriate: allergies, current medications, past family history, past medical history, past social history, past surgical history and problem list.    Outpatient Medications Prior to Visit   Medication Sig Dispense Refill   • atorvastatin (LIPITOR) 10 MG tablet Take 1 tablet by mouth every night at bedtime. 90 tablet 1   • l-methylfolate-algae-B6-B12 (METANX) 3-90.314-2-35 MG capsule capsule Take 1 capsule by mouth 2 (Two) Times a Day. 180 capsule 3   • levothyroxine (Synthroid) 50 MCG tablet Take 1 tablet by mouth Daily. 90 tablet 1   • lisinopril-hydrochlorothiazide (PRINZIDE,ZESTORETIC) 20-12.5 MG per tablet Take 1 tablet by mouth Daily. 90 tablet 1   • multivitamin with minerals tablet tablet Take 1 tablet by mouth Daily.     • aspirin (aspirin) 81 MG EC tablet Take 1 tablet by mouth Daily.     • psyllium (KONSYL) 28.3 % pack pack Take 1 packet by mouth Daily.     • donepezil (ARICEPT) 5 MG tablet Take 1 tablet by mouth Every Night. 90 tablet 1     No facility-administered medications prior to visit.       Patient Active Problem List   Diagnosis   • Blister of great toe of left foot, initial encounter   • Aphthous ulcer   • Benign essential hypertension   • Cough   • Heart murmur   • History of squamous cell carcinoma of skin   • Hyperlipidemia LDL goal <100   • Hypothyroidism   •  Impaired fasting glucose   • Medicare annual wellness visit, subsequent   • Nipple pain   • Peripheral vascular disease   • Vitamin D deficiency   • Acute vaginitis   • Dizziness   • Memory loss   • Acute respiratory failure with hypoxia   • Pneumonia of both lower lobes due to infectious organism   • Generalized muscle weakness   • Elevated homocysteine   • Pain of both hip joints       Advance Care Planning:  ACP discussion was held with the patient during this visit. Patient has an advance directive in EMR which is still valid.     Identification of Risk Factors:  Risk factors include: Cardiovascular risk  Chronic Pain .    Review of Systems   Constitutional: Negative for chills and fever.   HENT: Negative for hearing loss.    Respiratory: Negative for cough and shortness of breath.    Cardiovascular: Negative for chest pain and palpitations.   Gastrointestinal: Negative for abdominal pain, nausea and vomiting.   Musculoskeletal: Positive for arthralgias and myalgias.   Skin: Negative for rash.   Neurological: Negative for dizziness, light-headedness and headaches.   Psychiatric/Behavioral: Positive for decreased concentration (improved). Negative for dysphoric mood and sleep disturbance. The patient is not nervous/anxious.    All other systems reviewed and are negative.      Compared to one year ago, the patient feels her physical health is worse.  Compared to one year ago, the patient feels her mental health is worse.    Objective     Physical Exam  Vitals and nursing note reviewed.   Constitutional:       Appearance: Normal appearance. She is well-developed.   HENT:      Head: Normocephalic.      Ears:      Comments: Mild cerumen of both ears   Eyes:      Extraocular Movements: Extraocular movements intact.      Conjunctiva/sclera: Conjunctivae normal.   Cardiovascular:      Rate and Rhythm: Normal rate and regular rhythm.      Heart sounds: Normal heart sounds.   Pulmonary:      Effort: Pulmonary effort is  "normal. No respiratory distress.      Breath sounds: Normal breath sounds.   Abdominal:      General: Bowel sounds are normal.      Palpations: Abdomen is soft.      Tenderness: There is no abdominal tenderness.   Musculoskeletal:         General: Tenderness (mild bilateral hip pain with movement ) present.   Skin:     General: Skin is warm and dry.   Neurological:      General: No focal deficit present.      Mental Status: She is alert and oriented to person, place, and time.      Comments: Good get up and go and good recall 3 of 3 and good clock.    Psychiatric:         Mood and Affect: Mood normal.         Behavior: Behavior normal.        Current outpatient and discharge medications have been reconciled for the patient.  Reviewed by: Sylvester Roman MD  Procedures     Vitals:    05/04/23 0920   BP: 104/58   Pulse: 88   Temp: 98.2 °F (36.8 °C)   Weight: 63 kg (139 lb)   Height: 170.2 cm (67.01\")   PainSc: 0-No pain       BMI is within normal parameters. No other follow-up for BMI required.      Assessment & Plan   Problem List Items Addressed This Visit        Cardiac and Vasculature    Benign essential hypertension    Current Assessment & Plan     Hypertension is unchanged.  Continue current treatment regimen.  Regular aerobic exercise.  Blood pressure will be reassessed at the next regular appointment.         Relevant Medications    lisinopril-hydrochlorothiazide (PRINZIDE,ZESTORETIC) 20-12.5 MG per tablet    Hyperlipidemia LDL goal <100    Current Assessment & Plan     Lipid abnormalities are improving with treatment.  Nutritional counseling was provided. and Pharmacotherapy as ordered.  Lipids will be reassessed in 1 year Add coenzyme q 10 300 mg per day .         Relevant Medications    atorvastatin (LIPITOR) 10 MG tablet    Peripheral vascular disease    Current Assessment & Plan     Resume baby aspirin with warm water and continue exercise and atorvastatin             Endocrine and Metabolic    " Hypothyroidism    Current Assessment & Plan     Acute issue was low and then she recently reduce back to 50 mcg dose due to pelvic pain. Recheck labs later this month.          Relevant Medications    levothyroxine (Synthroid) 50 MCG tablet    Impaired fasting glucose    Current Assessment & Plan     Acute issue of elevated HBA1c and discussed resuming Konsyl and  decreasing bad carbohydrates, specifically sweets, breads, potatoes, corn and high caloric drinks (juices, sodas, sweet tea).  Also recommend increasing physical activity, ideally 150 minutes aerobic exercise weekly and resistance exercises 2-3x/week.            Health Encounters    Medicare annual wellness visit, subsequent - Primary    Current Assessment & Plan     Her mood is good overall. Good get up and go and good recall 3 of 3 and good clock. See Sirisha Partida for skin survey today and see dr Nava over the summer. Age-appropriate Counseling:  Discussed preventative medicine issues with patient including regular exercise, healthy diet, stress reduction, adequate sleep and recommended age-appropriate screening studies.  Immunizations reviewed.                 Hematology and Neoplasia    History of squamous cell carcinoma of skin    Current Assessment & Plan     See Sirisha Partida today            Musculoskeletal and Injuries    Pain of both hip joints    Current Assessment & Plan     Acute issue and will get labs and do exercises with Torres             Neuro    Memory loss    Current Assessment & Plan     Improved off aricept and doing better with diet and exercise and getting over her hospitalization and taking the metanx             Symptoms and Signs    Elevated homocysteine    Current Assessment & Plan     It has normalized on metanax          Relevant Medications    l-methylfolate-algae-B6-B12 (METANX) 3-90.314-2-35 MG capsule capsule   Other Visit Diagnoses     Myalgia        Check labs and take coenzyme Q 10 300 mg per day     Relevant Orders     C-reactive Protein    Sedimentation Rate    Myoglobin, Serum        Patient Self-Management and Personalized Health Advice  The patient has been provided with information about: diet, exercise, weight management and prevention of cardiac or vascular disease and preventive services including:   · Annual Wellness Visit (AWV).    Outpatient Encounter Medications as of 5/4/2023   Medication Sig Dispense Refill   • atorvastatin (LIPITOR) 10 MG tablet Take 1 tablet by mouth every night at bedtime. 90 tablet 1   • l-methylfolate-algae-B6-B12 (METANX) 3-90.314-2-35 MG capsule capsule Take 1 capsule by mouth 2 (Two) Times a Day. 180 capsule 3   • levothyroxine (Synthroid) 50 MCG tablet Take 1 tablet by mouth Daily. 90 tablet 1   • lisinopril-hydrochlorothiazide (PRINZIDE,ZESTORETIC) 20-12.5 MG per tablet Take 1 tablet by mouth Daily. 90 tablet 1   • multivitamin with minerals tablet tablet Take 1 tablet by mouth Daily.     • aspirin (aspirin) 81 MG EC tablet Take 1 tablet by mouth Daily.     • psyllium (KONSYL) 28.3 % pack pack Take 1 packet by mouth Daily.     • [DISCONTINUED] donepezil (ARICEPT) 5 MG tablet Take 1 tablet by mouth Every Night. 90 tablet 1     No facility-administered encounter medications on file as of 5/4/2023.       Reviewed use of high risk medication in the elderly: yes  Reviewed for potential of harmful drug interactions in the elderly: yes    Follow Up:  Return in about 6 months (around 11/4/2023) for Recheck.     There are no Patient Instructions on file for this visit.    An After Visit Summary and PPPS with all of these plans were given to the patient.

## 2023-05-10 ENCOUNTER — TELEPHONE (OUTPATIENT)
Dept: INTERNAL MEDICINE | Facility: CLINIC | Age: 78
End: 2023-05-10
Payer: MEDICARE

## 2023-05-10 DIAGNOSIS — E03.9 ACQUIRED HYPOTHYROIDISM: Primary | ICD-10-CM

## 2023-05-25 RX ORDER — LEVOTHYROXINE SODIUM 0.05 MG/1
50 TABLET ORAL DAILY
Qty: 90 TABLET | Refills: 1 | Status: SHIPPED | OUTPATIENT
Start: 2023-05-25

## 2023-05-25 NOTE — TELEPHONE ENCOUNTER
"Caller: Sandy Brownlee \"Agueda\"    Relationship: Self    Best call back number: [phone]    Requested Prescriptions:   Requested Prescriptions     Pending Prescriptions Disp Refills   • levothyroxine (Synthroid) 50 MCG tablet 90 tablet 1     Sig: Take 1 tablet by mouth Daily.      Pharmacy where request should be sent: [id number] [address] [facility] [address] [facility] [phone] [facility] [phone] FX     Last office visit with prescribing clinician: 5/4/2023   Last telemedicine visit with prescribing clinician: Visit date not found   Next office visit with prescribing clinician: 11/6/2023     Additional details provided by patient:4 PILLS ON HAND     Does the patient have less than a 3 day supply:  [x] Yes  [] No    Would you like a call back once the refill request has been completed: [] Yes [] No    If the office needs to give you a call back, can they leave a voicemail: [] Yes [] No    Ml Peters Rep   05/25/23 10:21 EDT           "

## 2023-06-07 RX ORDER — LISINOPRIL AND HYDROCHLOROTHIAZIDE 20; 12.5 MG/1; MG/1
1 TABLET ORAL DAILY
Qty: 90 TABLET | Refills: 1 | Status: SHIPPED | OUTPATIENT
Start: 2023-06-07

## 2023-06-21 NOTE — ASSESSMENT & PLAN NOTE
Discussed decreasing bad carbohydrates, specifically sweets, breads, potatoes, corn and high caloric drinks (juices, sodas, sweet tea).  Also recommend increasing physical activity, ideally 150 minutes aerobic exercise weekly and resistance exercises 2-3x/week.   Cartilage Graft Text: The defect edges were debeveled with a #15 scalpel blade.  Given the location of the defect, shape of the defect, the fact the defect involved a full thickness cartilage defect a cartilage graft was deemed most appropriate.  An appropriate donor site was identified, cleansed, and anesthetized. The cartilage graft was then harvested and transferred to the recipient site, oriented appropriately and then sutured into place.  The secondary defect was then repaired using a primary closure.

## 2023-08-22 ENCOUNTER — TELEPHONE (OUTPATIENT)
Dept: INTERNAL MEDICINE | Facility: CLINIC | Age: 78
End: 2023-08-22

## 2023-08-22 NOTE — TELEPHONE ENCOUNTER
"  Caller: Sandy Brownlee \"Agueda\"    Relationship: Self    Best call back number: [phone]     What is the best time to reach you: ANY    Who are you requesting to speak with (clinical staff, provider,  specific staff member): DR. ALBARADO AND HIS NURSSE    What was the call regarding: SHE WAS IN HOSPITAL FOR A COUPLE MONTHS WITH PNEUMONIA LAST YEAR. SHOULD SHE GO AHEAD AND TAKE THE PNUEMONIA SHOT AND THE RSV? IS THERE ANY TIME SHE NEEDS TO WAIT BETWEEN THESE? CAN THESE BE DONE AT THE SAME TIME? PLEASE CALL HER TO LET HER KNOW.    Is it okay if the provider responds through [employer]: NO      "

## 2023-09-08 ENCOUNTER — OFFICE VISIT (OUTPATIENT)
Dept: ENDOCRINOLOGY | Facility: CLINIC | Age: 78
End: 2023-09-08
Payer: MEDICARE

## 2023-09-08 VITALS
WEIGHT: 141 LBS | SYSTOLIC BLOOD PRESSURE: 128 MMHG | HEART RATE: 76 BPM | BODY MASS INDEX: 22.13 KG/M2 | DIASTOLIC BLOOD PRESSURE: 72 MMHG | OXYGEN SATURATION: 99 % | HEIGHT: 67 IN

## 2023-09-08 DIAGNOSIS — E03.9 HYPOTHYROIDISM, UNSPECIFIED TYPE: Primary | ICD-10-CM

## 2023-09-08 DIAGNOSIS — M85.80 OSTEOPENIA, UNSPECIFIED LOCATION: ICD-10-CM

## 2023-09-08 LAB
T4 FREE SERPL-MCNC: 1.28 NG/DL (ref 0.93–1.7)
TSH SERPL DL<=0.05 MIU/L-ACNC: 1.52 UIU/ML (ref 0.27–4.2)

## 2023-09-08 PROCEDURE — 84443 ASSAY THYROID STIM HORMONE: CPT | Performed by: INTERNAL MEDICINE

## 2023-09-08 PROCEDURE — 99204 OFFICE O/P NEW MOD 45 MIN: CPT | Performed by: INTERNAL MEDICINE

## 2023-09-08 PROCEDURE — 3074F SYST BP LT 130 MM HG: CPT | Performed by: INTERNAL MEDICINE

## 2023-09-08 PROCEDURE — 3078F DIAST BP <80 MM HG: CPT | Performed by: INTERNAL MEDICINE

## 2023-09-08 PROCEDURE — 84439 ASSAY OF FREE THYROXINE: CPT | Performed by: INTERNAL MEDICINE

## 2023-09-08 PROCEDURE — 36415 COLL VENOUS BLD VENIPUNCTURE: CPT | Performed by: INTERNAL MEDICINE

## 2023-09-08 NOTE — PROGRESS NOTES
Chief Complaint   Patient presents with    Hypothyroidism        New patient who is being seen in consultation regarding hypothyroidism at the request of Sylvester Roman MD     HPI   Sandy Brownlee is a 77 y.o. female who presents for evaluation of hypothyroidism.     Patient reports that hypothyroidism was initially diagnosed on routine labs with her PCP.  PCP started levothyroxine which was increased to 75 mcg daily by provider in [place] earlier this year.  She reports that shortly following this dose increase she was noted to have laboratory abnormality and was also diagnosed with osteopenia per bone density scan.  Patient does report concern for development of bilateral hip pain which she stated started relatively acutely earlier this year.  Of note, she did have a prolonged hospitalization after an ICU stay and required transfer to acute rehabilitation prior to development of these concerns.  Patient reports that after abnormal TSH was noted this spring, dose of levothyroxine was reduced back to 50 mcg daily.  She is taking this regularly and denies any concerns for adverse effect.  She is not currently taking any medication for osteopenia, osteoporosis.  She is not currently taking any vitamin D or calcium supplementation.  She denies any history of fracture.  She denies any history of steroid use, smoking, excessive alcohol use, rheumatoid arthritis.  No parental history of fracture.      Past Medical History:   Diagnosis Date    Acute respiratory failure with hypoxia 12/12/2022 11/2022 [facility]     Pneumonia of both lower lobes due to infectious organism 12/12/2022    She had left > right lower lobe and had apices bilaterally 11/27/2022    Skin lesion of right leg 11/2017    precancerous    Vertigo 2004     2nd HTN     Past Surgical History:   Procedure Laterality Date    HYSTERECTOMY  1986    SKIN CANCER EXCISION      Right arm Dr Partida      Family History   Problem Relation Age of Onset     Hyperlipidemia Mother     Hypertension Mother     Diabetes Maternal Aunt     Obesity Maternal Aunt       Social History     Socioeconomic History    Marital status:      Spouse name: Vince    Number of children: 2    Years of education: 16    Highest education level: Some college, no degree   Tobacco Use    Smoking status: Never    Smokeless tobacco: Never   Vaping Use    Vaping Use: Never used   Substance and Sexual Activity    Alcohol use: Yes     Alcohol/week: 2.0 standard drinks     Types: 2 Glasses of wine per week     Comment: nightly     Drug use: Never    Sexual activity: Yes     Partners: Male     Birth control/protection: None      Allergies   Allergen Reactions    Hydrocodone GI Intolerance    Codeine Nausea Only    Morphine Nausea Only      Current Outpatient Medications on File Prior to Visit   Medication Sig Dispense Refill    atorvastatin (LIPITOR) 10 MG tablet Take 1 tablet by mouth every night at bedtime. 90 tablet 1    cholecalciferol (VITAMIN D3) 250 MCG (83240 UT) capsule Take 1 capsule by mouth Daily.      l-methylfolate-algae-B6-B12 (METANX) 3-90.314-2-35 MG capsule capsule Take 1 capsule by mouth 2 (Two) Times a Day. 180 capsule 3    levothyroxine (Synthroid) 50 MCG tablet Take 1 tablet by mouth Daily. 90 tablet 1    lisinopril-hydrochlorothiazide (PRINZIDE,ZESTORETIC) 20-12.5 MG per tablet TAKE 1 TABLET BY MOUTH DAILY 90 tablet 1    multivitamin with minerals tablet tablet Take 1 tablet by mouth Daily.      psyllium (KONSYL) 28.3 % pack pack Take 1 packet by mouth Daily.      [DISCONTINUED] aspirin (aspirin) 81 MG EC tablet Take 1 tablet by mouth Daily.       No current facility-administered medications on file prior to visit.        Review of Systems   Constitutional:  Negative for unexpected weight gain and unexpected weight loss.   Cardiovascular:  Negative for palpitations.   Gastrointestinal:  Negative for constipation and diarrhea.   Musculoskeletal:  Positive for  "arthralgias.      Vitals:    09/08/23 0959   BP: 128/72   Pulse: 76   SpO2: 99%   Weight: 64 kg (141 lb)   Height: 168.9 cm (66.5\")   Body mass index is 22.42 kg/m².     Physical Exam  Vitals reviewed.   Constitutional:       General: She is not in acute distress.     Appearance: Normal appearance.   HENT:      Head: Normocephalic and atraumatic.   Neck:      Thyroid: No thyromegaly.   Cardiovascular:      Rate and Rhythm: Normal rate and regular rhythm.   Pulmonary:      Effort: Pulmonary effort is normal.      Breath sounds: Normal breath sounds.   Neurological:      General: No focal deficit present.      Mental Status: She is alert.   Psychiatric:         Mood and Affect: Mood and affect normal.         Behavior: Behavior is cooperative.      Labs/Imaging   Latest Reference Range & Units 11/14/19 09:16 01/05/21 10:16 12/14/21 08:44 12/29/22 08:39 04/25/23 08:58   TSH Baseline 0.270 - 4.200 uIU/mL 6.740 (H) 7.660 (H) 10.000 (H) 5.830 (H) 1.940   Free T4 0.93 - 1.70 ng/dL 1.08 0.99 1.02 1.04    (H): Data is abnormally high    DEXA bone density  Order: [id number]  Narrative    HISTORY:  F, 76 y/o .  Screening for osteoporosis. Post menopausal    TECHNICAL FACTORS:  Bone Mineral Density (BMD) measurements of the lumbar spine and/or bilateral hips and/or nondominant forearm were obtained.    COMPARISON:  None.    FINDINGS:  The average bone mineral density of:    L1-L4: 1.137 g/cm2 .  T-score -0.4.  Z-score  1.5    Left Femoral Neck: 0.800 g/cm2 . T-score -1.7.  Z-score  0.4    Right Femoral Neck: 0.757 g/cm2 . T-score -2.0.  Z-score  0.1      radius 33%:  G/cm2 . T-score .  Z-score    CONCLUSION:  The patient is considered osteopenic as outlined below according to World [employer] Organization (WHO) criteria with a moderate fracture risk.    Reference Information:  The T-score is the number of standard deviations above or below the standard which is normal for young adults at their peak bone mineral density. The World " Health Organization (WHO) interprets the T-scores as follows:    Above -1  Normal bone density  Between -1 and -2.5  Osteopenia  Equal to / or below -2.5  Osteoporosis    As a practical clinical guideline, osteopenia may be graded as follows:  Mild -1 through -1.5  Moderate -1.6 through -2.0  Severe -2.1 through -2.4    The Z-score is the number of standard deviations above or below age-matched controls. A Z-score of less than -1.5 would be considered abnormal.    References:  1. NIH Osteoporosis and Related Bone Diseases http://www.osteo.org  2. International Society for Clinical Densitometry http://www.iscd.org  3. National Osteoporosis Foundation http://www.nof.org    Thank you for the opportunity to provide your interpretation.     Assessment and Plan    Diagnoses and all orders for this visit:    1. Hypothyroidism, unspecified type (Primary)  -     TSH; Future  -     T4, Free; Future  Noted on routine laboratory evaluation.  Most recent thyroid function testing was normal.  Patient did have labs indicative of subclinical hyperthyroidism after dose of levothyroxine was increased to 75 mcg daily in late March 2023 and dose was subsequently reduced back to 50 mcg daily which she is taking currently.  Patient is clinically euthyroid, we will plan to update labs today and adjust medication as clinically indicated  Symptoms of thyroid hormone abnormality were reviewed and patient was instructed to contact the clinic in the interim between visits with any concerning changes    2. Osteopenia, unspecified location  Prior DEXA reviewed, patient has osteopenia at the bilateral femoral necks.  No prior study available for comparison.  Discussed with patient that I believe it is unlikely her transient subclinical hyperthyroidism was the cause of development of osteopenia.  We also discussed that I would not expect osteopenia to be the cause of her sudden onset bilateral hip pain.  Patient wonders if this could have been  related to her prolonged hospitalization.  She will follow-up with her PCP for further evaluation of this.  Discussed calculation of FRAX, this was unable to be completed today as it is unclear what type of DEXA scanner was utilized.  We will request this information.  Reviewed that based on available data, patient would be near the cutoff for considering therapy based on risk of fracture of the hip.  Discussed risk factors for worsening bone density.  Reviewed benefit of maintaining mobility and balance.  Discussed recommendation for weightbearing exercise.  Discussed recommended dietary intake for calcium and vitamin D  Discussed fall prevention     Return in about 1 year (around 9/8/2024) for Next scheduled follow up. The patient was instructed to contact the clinic with any interval questions or concerns.    Nayeli Bartlett MD     Dictated Utilizing Dragon Dictation

## 2023-09-11 ENCOUNTER — TELEPHONE (OUTPATIENT)
Dept: ENDOCRINOLOGY | Facility: CLINIC | Age: 78
End: 2023-09-11
Payer: MEDICARE

## 2023-09-11 NOTE — TELEPHONE ENCOUNTER
----- Message from Nayeli Bartlett MD sent at 9/8/2023  5:15 PM EDT -----  Can you please request copy of DEXA scan from [facility] system in [place]? or just inquire what type of DEXA scanner they use  The report is in the chart but I need to know what type of machine was utilized in order to calculate a FRAX (GE versus Hologic, etc.)

## 2023-09-11 NOTE — TELEPHONE ENCOUNTER
Called and spoke to several people concerning machine for report. Last person I spoke to was Fabiana.    She stated the machine was a GE Lunar Prodigy.

## 2023-10-18 ENCOUNTER — LAB (OUTPATIENT)
Dept: LAB | Facility: HOSPITAL | Age: 78
End: 2023-10-18
Payer: MEDICARE

## 2023-10-18 DIAGNOSIS — I10 BENIGN ESSENTIAL HYPERTENSION: ICD-10-CM

## 2023-10-18 DIAGNOSIS — M79.10 MYALGIA: ICD-10-CM

## 2023-10-18 LAB
ALBUMIN UR-MCNC: <1.2 MG/DL
CREAT UR-MCNC: 90.1 MG/DL
CRP SERPL-MCNC: <0.3 MG/DL (ref 0–0.5)
ERYTHROCYTE [SEDIMENTATION RATE] IN BLOOD: 5 MM/HR (ref 0–30)
MICROALBUMIN/CREAT UR: NORMAL MG/G{CREAT}
MYOGLOBIN SERPL-MCNC: 36.4 NG/ML (ref 25–58)

## 2023-10-18 PROCEDURE — 82043 UR ALBUMIN QUANTITATIVE: CPT

## 2023-10-18 PROCEDURE — 83874 ASSAY OF MYOGLOBIN: CPT

## 2023-10-18 PROCEDURE — 82570 ASSAY OF URINE CREATININE: CPT

## 2023-10-18 PROCEDURE — 85652 RBC SED RATE AUTOMATED: CPT

## 2023-10-18 PROCEDURE — 86140 C-REACTIVE PROTEIN: CPT

## 2023-11-01 RX ORDER — LISINOPRIL AND HYDROCHLOROTHIAZIDE 20; 12.5 MG/1; MG/1
1 TABLET ORAL DAILY
Qty: 90 TABLET | Refills: 1 | Status: SHIPPED | OUTPATIENT
Start: 2023-11-01

## 2023-11-06 ENCOUNTER — OFFICE VISIT (OUTPATIENT)
Dept: INTERNAL MEDICINE | Facility: CLINIC | Age: 78
End: 2023-11-06
Payer: MEDICARE

## 2023-11-06 VITALS
SYSTOLIC BLOOD PRESSURE: 110 MMHG | WEIGHT: 143 LBS | TEMPERATURE: 97.8 F | HEIGHT: 66 IN | HEART RATE: 70 BPM | BODY MASS INDEX: 22.98 KG/M2 | DIASTOLIC BLOOD PRESSURE: 58 MMHG

## 2023-11-06 DIAGNOSIS — M25.552 PAIN OF BOTH HIP JOINTS: ICD-10-CM

## 2023-11-06 DIAGNOSIS — R73.01 IMPAIRED FASTING GLUCOSE: Primary | ICD-10-CM

## 2023-11-06 DIAGNOSIS — M25.551 PAIN OF BOTH HIP JOINTS: ICD-10-CM

## 2023-11-06 DIAGNOSIS — M62.81 GENERALIZED MUSCLE WEAKNESS: ICD-10-CM

## 2023-11-06 DIAGNOSIS — M54.50 CHRONIC BILATERAL LOW BACK PAIN WITHOUT SCIATICA: ICD-10-CM

## 2023-11-06 DIAGNOSIS — E55.9 VITAMIN D DEFICIENCY: ICD-10-CM

## 2023-11-06 DIAGNOSIS — I10 BENIGN ESSENTIAL HYPERTENSION: ICD-10-CM

## 2023-11-06 DIAGNOSIS — G89.29 CHRONIC BILATERAL LOW BACK PAIN WITHOUT SCIATICA: ICD-10-CM

## 2023-11-06 DIAGNOSIS — E78.5 HYPERLIPIDEMIA LDL GOAL <100: ICD-10-CM

## 2023-11-06 PROCEDURE — 99214 OFFICE O/P EST MOD 30 MIN: CPT | Performed by: INTERNAL MEDICINE

## 2023-11-06 PROCEDURE — 3074F SYST BP LT 130 MM HG: CPT | Performed by: INTERNAL MEDICINE

## 2023-11-06 PROCEDURE — 1160F RVW MEDS BY RX/DR IN RCRD: CPT | Performed by: INTERNAL MEDICINE

## 2023-11-06 PROCEDURE — 1159F MED LIST DOCD IN RCRD: CPT | Performed by: INTERNAL MEDICINE

## 2023-11-06 PROCEDURE — 3078F DIAST BP <80 MM HG: CPT | Performed by: INTERNAL MEDICINE

## 2023-11-06 NOTE — PROGRESS NOTES
"Chief Complaint   Patient presents with    Joint Pain       History of Present Illness  77 y.o. female presents for follow up of joint pain and recent labs. She has muscle aches     Review of Systems   Constitutional:  Negative for chills and fever.   Respiratory:  Negative for cough and shortness of breath.    Cardiovascular:  Negative for chest pain and palpitations.   Gastrointestinal:  Negative for abdominal pain, nausea and vomiting.   Musculoskeletal:  Positive for arthralgias and myalgias.   Skin:  Negative for rash.   Neurological:  Negative for dizziness, light-headedness and headaches.   Psychiatric/Behavioral:  Negative for sleep disturbance.    All other systems reviewed and are negative.    .    PMSFH:  The following portions of the patient's history were reviewed and updated as appropriate: allergies, current medications, past family history, past medical history, past social history, past surgical history and problem list.      Current Outpatient Medications:     atorvastatin (LIPITOR) 10 MG tablet, Take 1 tablet by mouth every night at bedtime., Disp: 90 tablet, Rfl: 1    cholecalciferol (VITAMIN D3) 250 MCG (36198 UT) capsule, Take 1 capsule by mouth Daily., Disp: , Rfl:     l-methylfolate-algae-B6-B12 (METANX) 3-90.314-2-35 MG capsule capsule, Take 1 capsule by mouth 2 (Two) Times a Day., Disp: 180 capsule, Rfl: 3    levothyroxine (Synthroid) 50 MCG tablet, Take 1 tablet by mouth Daily., Disp: 90 tablet, Rfl: 1    lisinopril-hydrochlorothiazide (PRINZIDE,ZESTORETIC) 20-12.5 MG per tablet, TAKE 1 TABLET BY MOUTH DAILY, Disp: 90 tablet, Rfl: 1    multivitamin with minerals tablet tablet, Take 1 tablet by mouth Daily., Disp: , Rfl:     psyllium (KONSYL) 28.3 % pack pack, Take 1 packet by mouth Daily., Disp: , Rfl:     VITALS:  /58   Pulse 70   Temp 97.8 °F (36.6 °C)   Ht 168.9 cm (66.5\")   Wt 64.9 kg (143 lb)   BMI 22.74 kg/m²     Physical Exam  Constitutional:       Appearance: Normal " appearance.   HENT:      Right Ear: Tympanic membrane and ear canal normal.      Left Ear: Ear canal normal.   Cardiovascular:      Rate and Rhythm: Normal rate and regular rhythm.   Pulmonary:      Effort: Pulmonary effort is normal.   Abdominal:      General: Bowel sounds are normal.      Palpations: Abdomen is soft.   Musculoskeletal:         General: Tenderness (mild low  and bilateral lateral hip tender) present.   Neurological:      General: No focal deficit present.      Mental Status: She is alert and oriented to person, place, and time.   Psychiatric:         Mood and Affect: Mood normal.         Behavior: Behavior normal.         LABS:    CMP:  Lab Results   Component Value Date    BUN 18 04/25/2023    CREATININE 0.94 04/25/2023    EGFRIFNONA 78 12/14/2021     04/25/2023    K 4.5 04/25/2023     04/25/2023    CALCIUM 10.6 (H) 04/25/2023    ALBUMIN 4.6 04/25/2023    BILITOT 0.4 04/25/2023    ALKPHOS 69 04/25/2023    AST 21 04/25/2023    ALT 15 04/25/2023     CBC:  Lab Results   Component Value Date    WBC 4.54 04/25/2023    RBC 4.01 04/25/2023    HGB 13.0 04/25/2023    HCT 36.9 04/25/2023    MCV 92.0 04/25/2023    MCH 32.4 04/25/2023    MCHC 35.2 04/25/2023    RDW 11.8 (L) 04/25/2023     04/25/2023     TSH:  Lab Results   Component Value Date    TSH 1.520 09/08/2023     HGBA1C (LAST 3):  Lab Results   Component Value Date    HGBA1C 5.90 (H) 04/25/2023    HGBA1C 5.20 01/04/2023    HGBA1C 5.35 12/14/2021     Procedures         ASSESSMENT/PLAN:  Diagnoses and all orders for this visit:    1. Impaired fasting glucose (Primary)  Assessment & Plan:  Discussed decreasing bad carbohydrates, specifically sweets, breads, potatoes, corn and high caloric drinks (juices, sodas, sweet tea).  Also recommend increasing physical activity, ideally 150 minutes aerobic exercise weekly and resistance exercises 2-3x/week.     Orders:  -     Hemoglobin A1c; Future    2. Benign essential  hypertension  Assessment & Plan:  Hypertension is unchanged.  Continue current treatment regimen.  Regular aerobic exercise.  Blood pressure will be reassessed at the next regular appointment.      3. Hyperlipidemia LDL goal <100  Assessment & Plan:  Lipid abnormalities are unchanged.  Nutritional counseling was provided. and Pharmacotherapy as ordered.  Lipids will be reassessed in 6 months.    Orders:  -     Comprehensive Metabolic Panel; Future  -     Lipid Panel; Future  -     CBC & Differential; Future  -     Homocysteine; Future    4. Vitamin D deficiency  Assessment & Plan:  Follow labs.     Orders:  -     Vitamin D,25-Hydroxy; Future    5. Generalized muscle weakness  Assessment & Plan:  Proceed with labs and pain clinic and Rheumatology      6. Chronic bilateral low back pain without sciatica  Comments:  Proceed with labs and pain clinic and Rheumatology    7. Pain of both hip joints  Assessment & Plan:  Proceed with labs and pain clinic and Rheumatology    Orders:  -     Ambulatory Referral to Pain Management  -     Ambulatory Referral to Rheumatology        FOLLOW-UP:  Return in about 6 months (around 5/6/2024) for Medicare Wellness, Labs with next visit.      Electronically signed by:    Sylvester Roman MD

## 2023-11-08 ENCOUNTER — TELEPHONE (OUTPATIENT)
Dept: INTERNAL MEDICINE | Facility: CLINIC | Age: 78
End: 2023-11-08

## 2023-11-08 NOTE — TELEPHONE ENCOUNTER
THE PATIENT WOULD LIKE TO REQUEST THAT HER MYCHART BE DEACTIVATED SHE DOES NOT WANT TO USE THAT SHE ONLY WANTS TO RECEIVE PAPER COPIES OF TEST RESULTS AND VISIT SUMMARIES ECT.

## 2023-11-10 NOTE — TELEPHONE ENCOUNTER
DEACTIVATED MYCHART AS PER THE PT REQUEST.  I HAVE LVM WITH THE PT LETTING HER KNOW THIS HAS BEEN COMPLETED.

## 2023-11-20 RX ORDER — LEVOTHYROXINE SODIUM 0.03 MG/1
25 TABLET ORAL
Qty: 90 TABLET | Refills: 1 | Status: SHIPPED | OUTPATIENT
Start: 2023-11-20

## 2023-12-15 ENCOUNTER — TELEPHONE (OUTPATIENT)
Dept: INTERNAL MEDICINE | Facility: CLINIC | Age: 78
End: 2023-12-15

## 2023-12-15 ENCOUNTER — CLINICAL SUPPORT (OUTPATIENT)
Dept: INTERNAL MEDICINE | Facility: CLINIC | Age: 78
End: 2023-12-15
Payer: MEDICARE

## 2023-12-15 ENCOUNTER — TELEPHONE (OUTPATIENT)
Dept: INTERNAL MEDICINE | Facility: CLINIC | Age: 78
End: 2023-12-15
Payer: MEDICARE

## 2023-12-15 DIAGNOSIS — N30.01 ACUTE CYSTITIS WITH HEMATURIA: Primary | ICD-10-CM

## 2023-12-15 DIAGNOSIS — N30.01 ACUTE CYSTITIS WITH HEMATURIA: ICD-10-CM

## 2023-12-15 DIAGNOSIS — R30.0 DYSURIA: Primary | ICD-10-CM

## 2023-12-15 LAB
BILIRUB BLD-MCNC: NEGATIVE MG/DL
CLARITY, POC: ABNORMAL
COLOR UR: YELLOW
EXPIRATION DATE: ABNORMAL
GLUCOSE UR STRIP-MCNC: NEGATIVE MG/DL
KETONES UR QL: NEGATIVE
LEUKOCYTE EST, POC: ABNORMAL
Lab: ABNORMAL
NITRITE UR-MCNC: NEGATIVE MG/ML
PH UR: 5.5 [PH] (ref 5–8)
PROT UR STRIP-MCNC: NEGATIVE MG/DL
RBC # UR STRIP: ABNORMAL /UL
SP GR UR: 1.02 (ref 1–1.03)
UROBILINOGEN UR QL: ABNORMAL

## 2023-12-15 PROCEDURE — 81003 URINALYSIS AUTO W/O SCOPE: CPT | Performed by: INTERNAL MEDICINE

## 2023-12-15 PROCEDURE — 87086 URINE CULTURE/COLONY COUNT: CPT | Performed by: INTERNAL MEDICINE

## 2023-12-15 RX ORDER — NITROFURANTOIN 25; 75 MG/1; MG/1
100 CAPSULE ORAL 2 TIMES DAILY
Qty: 10 CAPSULE | Refills: 0 | Status: SHIPPED | OUTPATIENT
Start: 2023-12-15 | End: 2023-12-20

## 2023-12-15 NOTE — TELEPHONE ENCOUNTER
"Caller: Sandy Brownlee \"Agueda\"    Relationship: Self    Best call back number: [phone]     What orders are you requesting (i.e. lab or imaging): URINALYSIS    In what timeframe would the patient need to come in: 12/15/23    Where will you receive your lab/imaging services: IN OFFICE    Additional notes: PATIENT WOULD LIKE A CALL WHEN THE ORDER IS ISSUED.  "

## 2023-12-16 LAB — BACTERIA SPEC AEROBE CULT: NORMAL

## 2024-01-01 ENCOUNTER — HOSPITAL ENCOUNTER (EMERGENCY)
Facility: HOSPITAL | Age: 79
Discharge: HOME | End: 2024-01-01
Attending: EMERGENCY MEDICINE | Admitting: EMERGENCY MEDICINE
Payer: MEDICARE

## 2024-01-01 ENCOUNTER — APPOINTMENT (OUTPATIENT)
Dept: CT IMAGING | Facility: HOSPITAL | Age: 79
End: 2024-01-01
Payer: MEDICARE

## 2024-01-01 VITALS
DIASTOLIC BLOOD PRESSURE: 59 MMHG | RESPIRATION RATE: 16 BRPM | WEIGHT: 135 LBS | SYSTOLIC BLOOD PRESSURE: 128 MMHG | TEMPERATURE: 97.4 F | HEART RATE: 83 BPM | BODY MASS INDEX: 21.19 KG/M2 | HEIGHT: 67 IN | OXYGEN SATURATION: 96 %

## 2024-01-01 DIAGNOSIS — R30.0 DYSURIA: Primary | ICD-10-CM

## 2024-01-01 DIAGNOSIS — R11.0 NAUSEA: ICD-10-CM

## 2024-01-01 DIAGNOSIS — E87.1 HYPONATREMIA: ICD-10-CM

## 2024-01-01 LAB
ALBUMIN SERPL-MCNC: 4.7 G/DL (ref 3.5–5.2)
ALBUMIN/GLOB SERPL: 1.7 G/DL
ALP SERPL-CCNC: 73 U/L (ref 39–117)
ALT SERPL W P-5'-P-CCNC: 16 U/L (ref 1–33)
ANION GAP SERPL CALCULATED.3IONS-SCNC: 11 MMOL/L (ref 5–15)
AST SERPL-CCNC: 31 U/L (ref 1–32)
BASOPHILS # BLD AUTO: 0.04 10*3/MM3 (ref 0–0.2)
BASOPHILS NFR BLD AUTO: 0.7 % (ref 0–1.5)
BILIRUB SERPL-MCNC: 0.5 MG/DL (ref 0–1.2)
BILIRUB UR QL STRIP: NEGATIVE
BUN BLDA-MCNC: 16 MG/DL (ref 8–26)
BUN SERPL-MCNC: 16 MG/DL (ref 8–23)
BUN/CREAT SERPL: 18.2 (ref 7–25)
CA-I BLDA-SCNC: 1.25 MMOL/L (ref 1.2–1.32)
CALCIUM SPEC-SCNC: 10.5 MG/DL (ref 8.6–10.5)
CHLORIDE BLDA-SCNC: 93 MMOL/L (ref 98–109)
CHLORIDE SERPL-SCNC: 91 MMOL/L (ref 98–107)
CLARITY UR: CLEAR
CO2 BLDA-SCNC: 28 MMOL/L (ref 24–29)
CO2 SERPL-SCNC: 28 MMOL/L (ref 22–29)
COLOR UR: YELLOW
CREAT BLDA-MCNC: 0.8 MG/DL (ref 0.6–1.3)
CREAT BLDA-MCNC: 0.8 MG/DL (ref 0.6–1.3)
CREAT SERPL-MCNC: 0.88 MG/DL (ref 0.57–1)
D-LACTATE SERPL-SCNC: 1.1 MMOL/L (ref 0.5–2)
DEPRECATED RDW RBC AUTO: 42.7 FL (ref 37–54)
EGFRCR SERPLBLD CKD-EPI 2021: 67.4 ML/MIN/1.73
EGFRCR SERPLBLD CKD-EPI 2021: 75.5 ML/MIN/1.73
EOSINOPHIL # BLD AUTO: 0.08 10*3/MM3 (ref 0–0.4)
EOSINOPHIL NFR BLD AUTO: 1.5 % (ref 0.3–6.2)
ERYTHROCYTE [DISTWIDTH] IN BLOOD BY AUTOMATED COUNT: 11.8 % (ref 12.3–15.4)
GLOBULIN UR ELPH-MCNC: 2.7 GM/DL
GLUCOSE BLDC GLUCOMTR-MCNC: 122 MG/DL (ref 70–130)
GLUCOSE SERPL-MCNC: 143 MG/DL (ref 65–99)
GLUCOSE UR STRIP-MCNC: NEGATIVE MG/DL
HCT VFR BLD AUTO: 39 % (ref 34–46.6)
HCT VFR BLDA CALC: 40 % (ref 38–51)
HGB BLD-MCNC: 13.2 G/DL (ref 12–15.9)
HGB BLDA-MCNC: 13.6 G/DL (ref 12–17)
HGB UR QL STRIP.AUTO: NEGATIVE
HOLD SPECIMEN: NORMAL
IMM GRANULOCYTES # BLD AUTO: 0.01 10*3/MM3 (ref 0–0.05)
IMM GRANULOCYTES NFR BLD AUTO: 0.2 % (ref 0–0.5)
KETONES UR QL STRIP: ABNORMAL
LEUKOCYTE ESTERASE UR QL STRIP.AUTO: NEGATIVE
LIPASE SERPL-CCNC: 38 U/L (ref 13–60)
LYMPHOCYTES # BLD AUTO: 1.5 10*3/MM3 (ref 0.7–3.1)
LYMPHOCYTES NFR BLD AUTO: 28 % (ref 19.6–45.3)
MCH RBC QN AUTO: 33.2 PG (ref 26.6–33)
MCHC RBC AUTO-ENTMCNC: 33.8 G/DL (ref 31.5–35.7)
MCV RBC AUTO: 98.2 FL (ref 79–97)
MONOCYTES # BLD AUTO: 0.53 10*3/MM3 (ref 0.1–0.9)
MONOCYTES NFR BLD AUTO: 9.9 % (ref 5–12)
NEUTROPHILS NFR BLD AUTO: 3.19 10*3/MM3 (ref 1.7–7)
NEUTROPHILS NFR BLD AUTO: 59.7 % (ref 42.7–76)
NITRITE UR QL STRIP: NEGATIVE
NRBC BLD AUTO-RTO: 0 /100 WBC (ref 0–0.2)
PH UR STRIP.AUTO: 5.5 [PH] (ref 5–8)
PLATELET # BLD AUTO: 347 10*3/MM3 (ref 140–450)
PMV BLD AUTO: 8.7 FL (ref 6–12)
POTASSIUM BLDA-SCNC: 4.8 MMOL/L (ref 3.5–4.9)
POTASSIUM SERPL-SCNC: 4.9 MMOL/L (ref 3.5–5.2)
PROT SERPL-MCNC: 7.4 G/DL (ref 6–8.5)
PROT UR QL STRIP: NEGATIVE
RBC # BLD AUTO: 3.97 10*6/MM3 (ref 3.77–5.28)
SODIUM BLD-SCNC: 129 MMOL/L (ref 138–146)
SODIUM SERPL-SCNC: 130 MMOL/L (ref 136–145)
SP GR UR STRIP: 1.02 (ref 1–1.03)
UROBILINOGEN UR QL STRIP: ABNORMAL
WBC NRBC COR # BLD AUTO: 5.35 10*3/MM3 (ref 3.4–10.8)
WHOLE BLOOD HOLD COAG: NORMAL
WHOLE BLOOD HOLD SPECIMEN: NORMAL

## 2024-01-01 PROCEDURE — 99285 EMERGENCY DEPT VISIT HI MDM: CPT

## 2024-01-01 PROCEDURE — 81003 URINALYSIS AUTO W/O SCOPE: CPT | Performed by: EMERGENCY MEDICINE

## 2024-01-01 PROCEDURE — 85025 COMPLETE CBC W/AUTO DIFF WBC: CPT | Performed by: EMERGENCY MEDICINE

## 2024-01-01 PROCEDURE — 96374 THER/PROPH/DIAG INJ IV PUSH: CPT

## 2024-01-01 PROCEDURE — 85014 HEMATOCRIT: CPT

## 2024-01-01 PROCEDURE — P9612 CATHETERIZE FOR URINE SPEC: HCPCS

## 2024-01-01 PROCEDURE — 25510000001 IOPAMIDOL 61 % SOLUTION: Performed by: EMERGENCY MEDICINE

## 2024-01-01 PROCEDURE — 25810000003 SODIUM CHLORIDE 0.9 % SOLUTION: Performed by: EMERGENCY MEDICINE

## 2024-01-01 PROCEDURE — 83605 ASSAY OF LACTIC ACID: CPT | Performed by: EMERGENCY MEDICINE

## 2024-01-01 PROCEDURE — 74177 CT ABD & PELVIS W/CONTRAST: CPT

## 2024-01-01 PROCEDURE — 80053 COMPREHEN METABOLIC PANEL: CPT | Performed by: EMERGENCY MEDICINE

## 2024-01-01 PROCEDURE — 80047 BASIC METABLC PNL IONIZED CA: CPT

## 2024-01-01 PROCEDURE — 83690 ASSAY OF LIPASE: CPT | Performed by: EMERGENCY MEDICINE

## 2024-01-01 PROCEDURE — 25010000002 ONDANSETRON PER 1 MG: Performed by: EMERGENCY MEDICINE

## 2024-01-01 RX ORDER — ONDANSETRON 2 MG/ML
4 INJECTION INTRAMUSCULAR; INTRAVENOUS ONCE
Status: COMPLETED | OUTPATIENT
Start: 2024-01-01 | End: 2024-01-01

## 2024-01-01 RX ORDER — ONDANSETRON 4 MG/1
4 TABLET, FILM COATED ORAL EVERY 8 HOURS PRN
Qty: 15 TABLET | Refills: 0 | Status: SHIPPED | OUTPATIENT
Start: 2024-01-01 | End: 2024-01-03

## 2024-01-01 RX ORDER — SODIUM CHLORIDE 9 MG/ML
10 INJECTION INTRAVENOUS AS NEEDED
Status: DISCONTINUED | OUTPATIENT
Start: 2024-01-01 | End: 2024-01-01 | Stop reason: HOSPADM

## 2024-01-01 RX ADMIN — IOPAMIDOL 85 ML: 612 INJECTION, SOLUTION INTRAVENOUS at 10:41

## 2024-01-01 RX ADMIN — SODIUM CHLORIDE 500 ML: 9 INJECTION, SOLUTION INTRAVENOUS at 10:31

## 2024-01-01 RX ADMIN — ONDANSETRON 4 MG: 2 INJECTION INTRAMUSCULAR; INTRAVENOUS at 10:31

## 2024-01-02 ENCOUNTER — TELEPHONE (OUTPATIENT)
Dept: INTERNAL MEDICINE | Facility: CLINIC | Age: 79
End: 2024-01-02
Payer: MEDICARE

## 2024-01-02 ENCOUNTER — TELEPHONE (OUTPATIENT)
Dept: OBSTETRICS AND GYNECOLOGY | Facility: CLINIC | Age: 79
End: 2024-01-02

## 2024-01-02 DIAGNOSIS — R41.3 MEMORY LOSS: ICD-10-CM

## 2024-01-02 DIAGNOSIS — R41.3 MEMORY LOSS: Primary | ICD-10-CM

## 2024-01-02 RX ORDER — DONEPEZIL HYDROCHLORIDE 5 MG/1
5 TABLET, FILM COATED ORAL NIGHTLY
Qty: 90 TABLET | OUTPATIENT
Start: 2024-01-02

## 2024-01-02 RX ORDER — DONEPEZIL HYDROCHLORIDE 5 MG/1
5 TABLET, FILM COATED ORAL NIGHTLY
Qty: 30 TABLET | Refills: 1 | Status: SHIPPED | OUTPATIENT
Start: 2024-01-02

## 2024-01-02 NOTE — TELEPHONE ENCOUNTER
PATIENT'S DAUGHTER CALLING   PT WAS SEEN FOR AN INFECTION AND NOW HAS NEW WORSENING SYMPTOMS AND SHE WOULD LIKE HER TO BE SEEN TODAY IF POSSIBLE

## 2024-01-02 NOTE — ED PROVIDER NOTES
Subjective   History of Present Illness  78-year-old female presents for evaluation of nausea, lower abdominal pain, and dysuria.  Of note, the patient became symptomatic about 3 weeks ago with dysuria.  She saw her primary care physician and completed a course of outpatient antibiotics for a UTI without any significant improvement in her symptoms.  As a result, she was referred to urology where she completed a second round of oral antibiotics without any significant improvement.  She continues to be symptomatic, prompting her visit here to the emergency department for another opinion.  She denies any accompanying back or flank pain.  No rash.  She denies any vaginal rash or  pain.  She denies any fevers.  No night sweats, chills, or unintentional weight loss.      Review of Systems   Gastrointestinal:  Positive for abdominal pain and nausea.   Genitourinary:  Positive for dysuria.   All other systems reviewed and are negative.      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 11/27/2022    Skin lesion of right leg 11/2017    precancerous    Vertigo 2004     2nd HTN       Allergies   Allergen Reactions    Hydrocodone GI Intolerance    Codeine Nausea Only    Morphine Nausea Only       Past Surgical History:   Procedure Laterality Date    HYSTERECTOMY  1986    SKIN CANCER EXCISION      Right arm Dr Partida       Family History   Problem Relation Age of Onset    Hyperlipidemia Mother     Hypertension Mother     Diabetes Maternal Aunt     Obesity Maternal Aunt        Social History     Socioeconomic History    Marital status:      Spouse name: Vince    Number of children: 2    Years of education: 16    Highest education level: Some college, no degree   Tobacco Use    Smoking status: Never    Smokeless  tobacco: Never   Vaping Use    Vaping Use: Never used   Substance and Sexual Activity    Alcohol use: Yes     Alcohol/week: 2.0 standard drinks of alcohol     Types: 2 Glasses of wine per week     Comment: nightly     Drug use: Never    Sexual activity: Yes     Partners: Male     Birth control/protection: None           Objective   Physical Exam  Vitals and nursing note reviewed.   Constitutional:       General: She is not in acute distress.     Appearance: She is well-developed. She is not diaphoretic.      Comments: Nontoxic-appearing female   HENT:      Head: Normocephalic and atraumatic.   Eyes:      Pupils: Pupils are equal, round, and reactive to light.   Cardiovascular:      Rate and Rhythm: Normal rate and regular rhythm.      Heart sounds: Normal heart sounds. No murmur heard.     No friction rub. No gallop.   Pulmonary:      Effort: Pulmonary effort is normal. No respiratory distress.      Breath sounds: Normal breath sounds. No wheezing or rales.   Abdominal:      General: Bowel sounds are normal. There is no distension.      Palpations: Abdomen is soft. There is no mass.      Tenderness: There is abdominal tenderness. There is guarding. There is no rebound.      Comments: Suprapubic abdominal tenderness present with guarding noted, no pain out of proportion to exam   Genitourinary:     Comments: No CVA tenderness noted, unremarkable  exam, no vaginal discharge noted, no  rash present  Musculoskeletal:         General: Normal range of motion.      Cervical back: Neck supple.   Skin:     General: Skin is warm and dry.      Findings: No erythema or rash.      Comments: No dermatomal rash   Neurological:      Mental Status: She is alert and oriented to person, place, and time.   Psychiatric:         Mood and Affect: Mood normal.         Thought Content: Thought content normal.         Judgment: Judgment normal.         Procedures           ED Course  ED Course as of 01/01/24 1925 Mon Jan 01, 2024    1006 78-year-old female presents for evaluation of nausea, lower abdominal pain, and dysuria.  Of note, the patient became symptomatic about 3 weeks ago with dysuria.  She saw her primary care physician and completed a course of outpatient antibiotics for a UTI without any improvement in her symptoms.  She was then referred to urology where she completed a second round of oral antibiotics without any improvement.  She continues to be symptomatic, prompting her visit here for another opinion.  On arrival to the ED, the patient is nontoxic-appearing.  On exam, she has suprapubic abdominal tenderness with guarding noted.  No pain out of proportion to exam.  Unremarkable  exam without any rash or erythema present.  No vaginal discharge.  No CVA tenderness noted.  Broad differential diagnosis.  We will obtain labs and imaging, and we will reassess following initial interventions. [DD]   1051 Labs remarkable only for mild hyponatremia. [DD]   1115 I personally and independently reviewed the patient's CT images and findings, and I am in agreement with the radiologist regarding CT interpretation--particularly, there is no emergent intra-abdominal process present. [DD]   1117 Upon reevaluation, the patient looks and feels improved.  Patient reassured.  We discussed multiple potential etiologies for her symptoms, including interstitial cystitis.  She will follow-up with her urologist, Dr. Arreola, within the next week.  Agreeable with plan and given appropriate strict return precautions.  Prescription for Zofran as needed. [DD]      ED Course User Index  [DD] Jose Nova MD                                   Recent Results (from the past 24 hour(s))   Comprehensive Metabolic Panel    Collection Time: 01/01/24 10:19 AM    Specimen: Blood   Result Value Ref Range    Glucose 143 (H) 65 - 99 mg/dL    BUN 16 8 - 23 mg/dL    Creatinine 0.88 0.57 - 1.00 mg/dL    Sodium 130 (L) 136 - 145 mmol/L    Potassium 4.9 3.5 - 5.2  mmol/L    Chloride 91 (L) 98 - 107 mmol/L    CO2 28.0 22.0 - 29.0 mmol/L    Calcium 10.5 8.6 - 10.5 mg/dL    Total Protein 7.4 6.0 - 8.5 g/dL    Albumin 4.7 3.5 - 5.2 g/dL    ALT (SGPT) 16 1 - 33 U/L    AST (SGOT) 31 1 - 32 U/L    Alkaline Phosphatase 73 39 - 117 U/L    Total Bilirubin 0.5 0.0 - 1.2 mg/dL    Globulin 2.7 gm/dL    A/G Ratio 1.7 g/dL    BUN/Creatinine Ratio 18.2 7.0 - 25.0    Anion Gap 11.0 5.0 - 15.0 mmol/L    eGFR 67.4 >60.0 mL/min/1.73   Lipase    Collection Time: 01/01/24 10:19 AM    Specimen: Blood   Result Value Ref Range    Lipase 38 13 - 60 U/L   Lactic Acid, Plasma    Collection Time: 01/01/24 10:19 AM    Specimen: Blood   Result Value Ref Range    Lactate 1.1 0.5 - 2.0 mmol/L   Green Top (Gel)    Collection Time: 01/01/24 10:19 AM   Result Value Ref Range    Extra Tube Hold for add-ons.    Lavender Top    Collection Time: 01/01/24 10:19 AM   Result Value Ref Range    Extra Tube hold for add-on    Gold Top - SST    Collection Time: 01/01/24 10:19 AM   Result Value Ref Range    Extra Tube Hold for add-ons.    Gray Top    Collection Time: 01/01/24 10:19 AM   Result Value Ref Range    Extra Tube Hold for add-ons.    Light Blue Top    Collection Time: 01/01/24 10:19 AM   Result Value Ref Range    Extra Tube Hold for add-ons.    CBC Auto Differential    Collection Time: 01/01/24 10:19 AM    Specimen: Blood   Result Value Ref Range    WBC 5.35 3.40 - 10.80 10*3/mm3    RBC 3.97 3.77 - 5.28 10*6/mm3    Hemoglobin 13.2 12.0 - 15.9 g/dL    Hematocrit 39.0 34.0 - 46.6 %    MCV 98.2 (H) 79.0 - 97.0 fL    MCH 33.2 (H) 26.6 - 33.0 pg    MCHC 33.8 31.5 - 35.7 g/dL    RDW 11.8 (L) 12.3 - 15.4 %    RDW-SD 42.7 37.0 - 54.0 fl    MPV 8.7 6.0 - 12.0 fL    Platelets 347 140 - 450 10*3/mm3    Neutrophil % 59.7 42.7 - 76.0 %    Lymphocyte % 28.0 19.6 - 45.3 %    Monocyte % 9.9 5.0 - 12.0 %    Eosinophil % 1.5 0.3 - 6.2 %    Basophil % 0.7 0.0 - 1.5 %    Immature Grans % 0.2 0.0 - 0.5 %    Neutrophils, Absolute 3.19  1.70 - 7.00 10*3/mm3    Lymphocytes, Absolute 1.50 0.70 - 3.10 10*3/mm3    Monocytes, Absolute 0.53 0.10 - 0.90 10*3/mm3    Eosinophils, Absolute 0.08 0.00 - 0.40 10*3/mm3    Basophils, Absolute 0.04 0.00 - 0.20 10*3/mm3    Immature Grans, Absolute 0.01 0.00 - 0.05 10*3/mm3    nRBC 0.0 0.0 - 0.2 /100 WBC   POC CHEM 8    Collection Time: 01/01/24 10:25 AM    Specimen: Blood   Result Value Ref Range    Glucose 122 70 - 130 mg/dL    BUN 16 8 - 26 mg/dL    Creatinine 0.80 0.60 - 1.30 mg/dL    Sodium 129 (L) 138 - 146 mmol/L    POC Potassium 4.8 3.5 - 4.9 mmol/L    Chloride 93 (L) 98 - 109 mmol/L    Total CO2 28 24 - 29 mmol/L    Hemoglobin 13.6 12.0 - 17.0 g/dL    Hematocrit 40 38 - 51 %    Ionized Calcium 1.25 1.20 - 1.32 mmol/L    eGFR 75.5 >60.0 mL/min/1.73   POC Creatinine    Collection Time: 01/01/24 10:28 AM    Specimen: Blood   Result Value Ref Range    Creatinine 0.80 0.60 - 1.30 mg/dL   Urinalysis With Culture If Indicated - Urine, Catheter    Collection Time: 01/01/24 10:28 AM    Specimen: Urine, Catheter   Result Value Ref Range    Color, UA Yellow Yellow, Straw    Appearance, UA Clear Clear    pH, UA 5.5 5.0 - 8.0    Specific Gravity, UA 1.016 1.001 - 1.030    Glucose, UA Negative Negative    Ketones, UA 15 mg/dL (1+) (A) Negative    Bilirubin, UA Negative Negative    Blood, UA Negative Negative    Protein, UA Negative Negative    Leuk Esterase, UA Negative Negative    Nitrite, UA Negative Negative    Urobilinogen, UA 0.2 E.U./dL 0.2 - 1.0 E.U./dL     Note: In addition to lab results from this visit, the labs listed above may include labs taken at another facility or during a different encounter within the last 24 hours. Please correlate lab times with ED admission and discharge times for further clarification of the services performed during this visit.    CT Abdomen Pelvis With Contrast   Final Result   Impression:      1. No clearly acute intra-abdominal or intrapelvic disease is seen.      2. Small right  "upper quadrant peritoneal cyst, stable to decreased from the 2005 exam.      3. Semisolid stool throughout the colon, but no visible colon wall inflammation or other irregularity.      4. Small nonobstructing bilateral renal calculi.            Electronically Signed: Aaron Cali MD     1/1/2024 11:09 AM EST     Workstation ID: [id number]        Vitals:    01/01/24 0832 01/01/24 1032 01/01/24 1102 01/01/24 1104   BP: 119/84 128/59 128/59    BP Location: Left arm      Patient Position: Sitting      Pulse: 96 80  83   Resp: 16      Temp: 97.4 °F (36.3 °C)      TempSrc: Oral      SpO2: 98% 95%  96%   Weight: 61.2 kg (135 lb)      Height: 170.2 cm (67\")        Medications   sodium chloride 0.9 % bolus 500 mL (0 mL Intravenous Stopped 1/1/24 1104)   ondansetron (ZOFRAN) injection 4 mg (4 mg Intravenous Given 1/1/24 1031)   iopamidol (ISOVUE-300) 61 % injection 100 mL (85 mL Intravenous Given 1/1/24 1041)     ECG/EMG Results (last 24 hours)       ** No results found for the last 24 hours. **          No orders to display                 Medical Decision Making  Problems Addressed:  Dysuria: complicated acute illness or injury  Hyponatremia: complicated acute illness or injury  Nausea: complicated acute illness or injury    Amount and/or Complexity of Data Reviewed  Labs: ordered.  Radiology: ordered.    Risk  Prescription drug management.        Final diagnoses:   Dysuria   Nausea   Hyponatremia       ED Disposition  ED Disposition       ED Disposition   Discharge    Condition   Stable    Comment   --               Ej Arreola Jr., MD  [address] [facility] [address]  [place] [address]  [phone]    In 1 week           Medication List        New Prescriptions      ondansetron 4 MG tablet  Commonly known as: ZOFRAN  Take 1 tablet by mouth Every 8 (Eight) Hours As Needed for Nausea.               Where to Get Your Medications        These medications were sent to [id number] - [place], [place] - [address] AT [facility] OF [address] - [phone]  - [phone] FX  [address], [facility] [address]      Phone: [phone]   ondansetron 4 MG tablet            Jose Nova MD  01/01/24 1927

## 2024-01-02 NOTE — TELEPHONE ENCOUNTER
She is having more memory loss and family request referral to Dr Nance and aricept sent in for her I have put in referral and sent in the aricept

## 2024-01-03 ENCOUNTER — OFFICE VISIT (OUTPATIENT)
Dept: OBSTETRICS AND GYNECOLOGY | Facility: CLINIC | Age: 79
End: 2024-01-03
Payer: MEDICARE

## 2024-01-03 VITALS — WEIGHT: 142 LBS | SYSTOLIC BLOOD PRESSURE: 102 MMHG | BODY MASS INDEX: 22.24 KG/M2 | DIASTOLIC BLOOD PRESSURE: 58 MMHG

## 2024-01-03 DIAGNOSIS — R30.0 DYSURIA: Primary | ICD-10-CM

## 2024-01-03 DIAGNOSIS — N76.0 ACUTE VAGINITIS: ICD-10-CM

## 2024-01-03 RX ORDER — ESTRADIOL 0.1 MG/G
CREAM VAGINAL
COMMUNITY
Start: 2023-12-29

## 2024-01-03 RX ORDER — DOXYCYCLINE 100 MG/1
CAPSULE ORAL
COMMUNITY
Start: 2023-12-23 | End: 2024-01-05

## 2024-01-03 RX ORDER — LIDOCAINE 50 MG/G
OINTMENT TOPICAL
COMMUNITY
Start: 2023-12-29

## 2024-01-03 NOTE — PROGRESS NOTES
No chief complaint on file.      Subjective   HPI  Sandy Brownlee is a 78 y.o. postmenopausal female who presents for vaginal burning. The patient was seen on 24 at the ED and was sent home with Zofran for nausea. She reported at the ED that she has been symptomatic for 3 weeks with burning. She has seen her PCP and her Urologist and has tried multiple medications. She states they've only treated her for UTI's and not done any kind of exam.    She has tried fluconazole, azo, uribel, doxycycline, estrace cream and lidocaine cream. She states she estrace and lidocaine cream burn.   The patient denies changes to soap, detergent, and toilet paper.     The patient reports the burning is constant and can be worse with and after urination.     Additional OB/GYN History     Last Pap :   Last Completed Pap Smear       This patient has no relevant Health Maintenance data.            Last mammogram:   Last Completed Mammogram       This patient has no relevant Health Maintenance data.            Tobacco Usage?: No   OB History          2    Para   2    Term   1            AB        Living   2         SAB        IAB        Ectopic        Molar        Multiple        Live Births   2                  Current Outpatient Medications:     atorvastatin (LIPITOR) 10 MG tablet, Take 1 tablet by mouth every night at bedtime., Disp: 90 tablet, Rfl: 1    cholecalciferol (VITAMIN D3) 250 MCG (54231 UT) capsule, Take 1 capsule by mouth Daily., Disp: , Rfl:     donepezil (Aricept) 5 MG tablet, Take 1 tablet by mouth Every Night., Disp: 30 tablet, Rfl: 1    doxycycline (MONODOX) 100 MG capsule, Take 1 capsule twice a day by oral route., Disp: , Rfl:     estradiol (ESTRACE) 0.1 MG/GM vaginal cream, Apply fingertip amount to urethral meatus daily, Disp: , Rfl:     levothyroxine (SYNTHROID, LEVOTHROID) 25 MCG tablet, TAKE 1 TABLET BY MOUTH EVERY MORNING, Disp: 90 tablet, Rfl: 1    lidocaine (XYLOCAINE) 5 %  ointment, APPLY TO AFFECTED AREA BY TOPICAL ROUTE 1-4 TIMES DAILY AS NEEDED, Disp: , Rfl:     lisinopril-hydrochlorothiazide (PRINZIDE,ZESTORETIC) 20-12.5 MG per tablet, TAKE 1 TABLET BY MOUTH DAILY, Disp: 90 tablet, Rfl: 1    multivitamin with minerals tablet tablet, Take 1 tablet by mouth Daily., Disp: , Rfl:     psyllium (KONSYL) 28.3 % pack pack, Take 1 packet by mouth Daily., Disp: , Rfl:     l-methylfolate-algae-B6-B12 (METANX) 3-90.314-2-35 MG capsule capsule, Take 1 capsule by mouth 2 (Two) Times a Day., Disp: 180 capsule, Rfl: 3     Past Medical History:   Diagnosis Date    Acute respiratory failure with hypoxia 12/12/2022 11/2022 [facility]     Pneumonia of both lower lobes due to infectious organism 12/12/2022    She had left > right lower lobe and had apices bilaterally 11/27/2022    Skin lesion of right leg 11/2017    precancerous    Vertigo 2004     2nd HTN        Past Surgical History:   Procedure Laterality Date    HYSTERECTOMY  1986    SKIN CANCER EXCISION      Right arm Dr Partida       The additional following portions of the patient's history were reviewed and updated as appropriate: allergies, current medications, past family history, past medical history, past social history, past surgical history, and problem list.    Review of Systems   Constitutional: Negative.    Respiratory: Negative.     Cardiovascular: Negative.    Gastrointestinal: Negative.    Genitourinary: Negative.    Psychiatric/Behavioral: Negative.         I have reviewed and agree with the HPI, ROS, and historical information as entered above. JOEL Arteaga      Objective   /58   Wt 64.4 kg (142 lb)   BMI 22.24 kg/m²     Physical Exam  Vitals and nursing note reviewed. Exam conducted with a chaperone present.   Constitutional:       Appearance: Normal appearance.   Pulmonary:      Effort: Pulmonary effort is normal.   Genitourinary:     General: Normal vulva.      Exam position: Lithotomy position.      Labia:          Right: No rash, tenderness or lesion.         Left: No rash, tenderness or lesion.       Vagina: No signs of injury. Vaginal discharge present. No erythema, tenderness or bleeding.      Comments: White/yellow thick discharge, no odor.  Musculoskeletal:         General: Normal range of motion.   Neurological:      Mental Status: She is alert and oriented to person, place, and time.         Assessment & Plan     Assessment     Problem List Items Addressed This Visit       Acute vaginitis    Relevant Orders    NuSwab VG, Candida 6sp - Swab, Vagina     Other Visit Diagnoses       Dysuria    -  Primary    Relevant Orders    Urine Culture - Urine, Urine, Clean Catch                Plan     Nuswab collected.  Urine culture obtained.  Will treat as indicated.   Continue current treatment regimen, on doxycycline for UTI.  Return as needed.        Ronda Hampton, JOEL  01/03/2024

## 2024-01-04 LAB — SPECIMEN STATUS: NORMAL

## 2024-01-05 ENCOUNTER — LAB (OUTPATIENT)
Dept: LAB | Facility: HOSPITAL | Age: 79
End: 2024-01-05
Payer: MEDICARE

## 2024-01-05 ENCOUNTER — OFFICE VISIT (OUTPATIENT)
Dept: INTERNAL MEDICINE | Facility: CLINIC | Age: 79
End: 2024-01-05
Payer: MEDICARE

## 2024-01-05 VITALS
BODY MASS INDEX: 22.13 KG/M2 | HEART RATE: 68 BPM | TEMPERATURE: 98.7 F | HEIGHT: 67 IN | SYSTOLIC BLOOD PRESSURE: 102 MMHG | WEIGHT: 141 LBS | DIASTOLIC BLOOD PRESSURE: 52 MMHG

## 2024-01-05 DIAGNOSIS — R73.01 IMPAIRED FASTING GLUCOSE: ICD-10-CM

## 2024-01-05 DIAGNOSIS — E78.5 HYPERLIPIDEMIA LDL GOAL <100: ICD-10-CM

## 2024-01-05 DIAGNOSIS — E55.9 VITAMIN D DEFICIENCY: ICD-10-CM

## 2024-01-05 DIAGNOSIS — R79.89 ELEVATED HOMOCYSTEINE: ICD-10-CM

## 2024-01-05 DIAGNOSIS — R41.3 MEMORY LOSS: ICD-10-CM

## 2024-01-05 DIAGNOSIS — I10 BENIGN ESSENTIAL HYPERTENSION: Primary | ICD-10-CM

## 2024-01-05 LAB
25(OH)D3 SERPL-MCNC: 103 NG/ML (ref 30–100)
A VAGINAE DNA VAG QL NAA+PROBE: NORMAL SCORE
ALBUMIN SERPL-MCNC: 4.5 G/DL (ref 3.5–5.2)
ALBUMIN/GLOB SERPL: 2 G/DL
ALP SERPL-CCNC: 69 U/L (ref 39–117)
ALT SERPL W P-5'-P-CCNC: 21 U/L (ref 1–33)
ANION GAP SERPL CALCULATED.3IONS-SCNC: 12.1 MMOL/L (ref 5–15)
AST SERPL-CCNC: 23 U/L (ref 1–32)
BACTERIA UR CULT: NO GROWTH
BACTERIA UR CULT: NORMAL
BASOPHILS # BLD AUTO: 0.04 10*3/MM3 (ref 0–0.2)
BASOPHILS NFR BLD AUTO: 0.9 % (ref 0–1.5)
BILIRUB SERPL-MCNC: 0.4 MG/DL (ref 0–1.2)
BUN SERPL-MCNC: 15 MG/DL (ref 8–23)
BUN/CREAT SERPL: 16.1 (ref 7–25)
BVAB2 DNA VAG QL NAA+PROBE: NORMAL SCORE
C ALBICANS DNA VAG QL NAA+PROBE: NEGATIVE
C GLABRATA DNA VAG QL NAA+PROBE: NEGATIVE
C KRUSEI DNA VAG QL NAA+PROBE: NEGATIVE
C LUSITANIAE DNA VAG QL NAA+PROBE: NEGATIVE
CALCIUM SPEC-SCNC: 10.2 MG/DL (ref 8.6–10.5)
CANDIDA DNA VAG QL NAA+PROBE: NEGATIVE
CHLORIDE SERPL-SCNC: 99 MMOL/L (ref 98–107)
CHOLEST SERPL-MCNC: 166 MG/DL (ref 0–200)
CO2 SERPL-SCNC: 24.9 MMOL/L (ref 22–29)
CREAT SERPL-MCNC: 0.93 MG/DL (ref 0.57–1)
DEPRECATED RDW RBC AUTO: 38.2 FL (ref 37–54)
EGFRCR SERPLBLD CKD-EPI 2021: 63 ML/MIN/1.73
EOSINOPHIL # BLD AUTO: 0.2 10*3/MM3 (ref 0–0.4)
EOSINOPHIL NFR BLD AUTO: 4.5 % (ref 0.3–6.2)
ERYTHROCYTE [DISTWIDTH] IN BLOOD BY AUTOMATED COUNT: 11.5 % (ref 12.3–15.4)
GLOBULIN UR ELPH-MCNC: 2.3 GM/DL
GLUCOSE SERPL-MCNC: 110 MG/DL (ref 65–99)
HBA1C MFR BLD: 5.3 % (ref 4.8–5.6)
HCT VFR BLD AUTO: 35.9 % (ref 34–46.6)
HCYS SERPL-MCNC: 9.7 UMOL/L (ref 0–15)
HDLC SERPL-MCNC: 78 MG/DL (ref 40–60)
HGB BLD-MCNC: 12.1 G/DL (ref 12–15.9)
IMM GRANULOCYTES # BLD AUTO: 0.01 10*3/MM3 (ref 0–0.05)
IMM GRANULOCYTES NFR BLD AUTO: 0.2 % (ref 0–0.5)
LDLC SERPL CALC-MCNC: 71 MG/DL (ref 0–100)
LDLC/HDLC SERPL: 0.88 {RATIO}
LYMPHOCYTES # BLD AUTO: 1.75 10*3/MM3 (ref 0.7–3.1)
LYMPHOCYTES NFR BLD AUTO: 39.5 % (ref 19.6–45.3)
MCH RBC QN AUTO: 31.3 PG (ref 26.6–33)
MCHC RBC AUTO-ENTMCNC: 33.7 G/DL (ref 31.5–35.7)
MCV RBC AUTO: 92.8 FL (ref 79–97)
MEGA1 DNA VAG QL NAA+PROBE: NORMAL SCORE
MONOCYTES # BLD AUTO: 0.38 10*3/MM3 (ref 0.1–0.9)
MONOCYTES NFR BLD AUTO: 8.6 % (ref 5–12)
NEUTROPHILS NFR BLD AUTO: 2.05 10*3/MM3 (ref 1.7–7)
NEUTROPHILS NFR BLD AUTO: 46.3 % (ref 42.7–76)
NRBC BLD AUTO-RTO: 0.2 /100 WBC (ref 0–0.2)
PLATELET # BLD AUTO: 383 10*3/MM3 (ref 140–450)
PMV BLD AUTO: 9 FL (ref 6–12)
POTASSIUM SERPL-SCNC: 4.5 MMOL/L (ref 3.5–5.2)
PROT SERPL-MCNC: 6.8 G/DL (ref 6–8.5)
RBC # BLD AUTO: 3.87 10*6/MM3 (ref 3.77–5.28)
SODIUM SERPL-SCNC: 136 MMOL/L (ref 136–145)
T VAGINALIS DNA VAG QL NAA+PROBE: NEGATIVE
TRIGL SERPL-MCNC: 96 MG/DL (ref 0–150)
VLDLC SERPL-MCNC: 17 MG/DL (ref 5–40)
WBC NRBC COR # BLD AUTO: 4.43 10*3/MM3 (ref 3.4–10.8)

## 2024-01-05 PROCEDURE — 80061 LIPID PANEL: CPT

## 2024-01-05 PROCEDURE — 80053 COMPREHEN METABOLIC PANEL: CPT

## 2024-01-05 PROCEDURE — 99214 OFFICE O/P EST MOD 30 MIN: CPT | Performed by: INTERNAL MEDICINE

## 2024-01-05 PROCEDURE — 85025 COMPLETE CBC W/AUTO DIFF WBC: CPT

## 2024-01-05 PROCEDURE — 83036 HEMOGLOBIN GLYCOSYLATED A1C: CPT

## 2024-01-05 PROCEDURE — 83090 ASSAY OF HOMOCYSTEINE: CPT

## 2024-01-05 PROCEDURE — 82306 VITAMIN D 25 HYDROXY: CPT

## 2024-01-05 NOTE — PROGRESS NOTES
"Chief Complaint   Patient presents with    Hypertension    Memory Loss       History of Present Illness  78 y.o. female presents for follow up of ER visit and recent bladder issues. Her belly pain has resolved and her urine is better. She has memory lapses.     Review of Systems   Gastrointestinal:  Negative for abdominal pain.   Genitourinary:  Negative for dysuria.   Psychiatric/Behavioral:  Positive for decreased concentration. Negative for dysphoric mood.      .    PMSFH:  The following portions of the patient's history were reviewed and updated as appropriate: allergies, current medications, past family history, past medical history, past social history, past surgical history and problem list.      Current Outpatient Medications:     atorvastatin (LIPITOR) 10 MG tablet, Take 1 tablet by mouth every night at bedtime., Disp: 90 tablet, Rfl: 1    cholecalciferol (VITAMIN D3) 250 MCG (86845 UT) capsule, Take 1 capsule by mouth Daily., Disp: , Rfl:     donepezil (Aricept) 5 MG tablet, Take 1 tablet by mouth Every Night., Disp: 30 tablet, Rfl: 1    estradiol (ESTRACE) 0.1 MG/GM vaginal cream, Apply fingertip amount to urethral meatus daily, Disp: , Rfl:     l-methylfolate-algae-B6-B12 (METANX) 3-90.314-2-35 MG capsule capsule, Take 1 capsule by mouth 2 (Two) Times a Day., Disp: 180 capsule, Rfl: 3    levothyroxine (SYNTHROID, LEVOTHROID) 25 MCG tablet, TAKE 1 TABLET BY MOUTH EVERY MORNING, Disp: 90 tablet, Rfl: 1    lidocaine (XYLOCAINE) 5 % ointment, APPLY TO AFFECTED AREA BY TOPICAL ROUTE 1-4 TIMES DAILY AS NEEDED, Disp: , Rfl:     lisinopril-hydrochlorothiazide (PRINZIDE,ZESTORETIC) 20-12.5 MG per tablet, TAKE 1 TABLET BY MOUTH DAILY, Disp: 90 tablet, Rfl: 1    multivitamin with minerals tablet tablet, Take 1 tablet by mouth Daily., Disp: , Rfl:     psyllium (KONSYL) 28.3 % pack pack, Take 1 packet by mouth Daily., Disp: , Rfl:     VITALS:  /52   Pulse 68   Temp 98.7 °F (37.1 °C)   Ht 170.2 cm (67.01\")   " Wt 64 kg (141 lb)   BMI 22.08 kg/m²     Physical Exam  HENT:      Head: Normocephalic and atraumatic.   Cardiovascular:      Rate and Rhythm: Normal rate and regular rhythm.   Pulmonary:      Effort: Pulmonary effort is normal.   Abdominal:      General: Bowel sounds are normal.      Palpations: Abdomen is soft.   Neurological:      General: No focal deficit present.      Mental Status: She is oriented to person, place, and time.   Psychiatric:         Mood and Affect: Mood normal.         Behavior: Behavior normal.         LABS:    CMP:  Lab Results   Component Value Date    BUN 15 01/05/2024    CREATININE 0.93 01/05/2024    EGFRIFNONA 78 12/14/2021     01/05/2024    K 4.5 01/05/2024    CL 99 01/05/2024    CALCIUM 10.2 01/05/2024    ALBUMIN 4.5 01/05/2024    BILITOT 0.4 01/05/2024    ALKPHOS 69 01/05/2024    AST 23 01/05/2024    ALT 21 01/05/2024     CBC:  Lab Results   Component Value Date    WBC 4.43 01/05/2024    RBC 3.87 01/05/2024    HGB 12.1 01/05/2024    HCT 35.9 01/05/2024    MCV 92.8 01/05/2024    MCH 31.3 01/05/2024    MCHC 33.7 01/05/2024    RDW 11.5 (L) 01/05/2024     01/05/2024     LIPID PANEL:  Lab Results   Component Value Date    TRIG 96 01/05/2024    HDL 78 (H) 01/05/2024    VLDL 17 01/05/2024    LDL 71 01/05/2024    LDLHDL 0.88 01/05/2024     HGBA1C (LAST 3):  Lab Results   Component Value Date    HGBA1C 5.30 01/05/2024    HGBA1C 5.90 (H) 04/25/2023    HGBA1C 5.20 01/04/2023     Procedures       Current outpatient and discharge medications have been reconciled for the patient.  Reviewed by: Sylvester Roman MD   ASSESSMENT/PLAN:  Diagnoses and all orders for this visit:    1. Benign essential hypertension (Primary)  Assessment & Plan:  Hypertension is unchanged.  Continue current treatment regimen.  Blood pressure will be reassessed at the next regular appointment.      2. Memory loss  Assessment & Plan:  Worsening and will start aricept 5 mg per day and refer to Dr Nance. In one  month if doing well maintain dose and if needs further improvement likely increase dose to 10 mg       3. Impaired fasting glucose  Assessment & Plan:  Discussed decreasing bad carbohydrates, specifically sweets, breads, potatoes, corn and high caloric drinks (juices, sodas, sweet tea).  Also recommend increasing physical activity, ideally 150 minutes aerobic exercise weekly and resistance exercises 2-3x/week.       4. Elevated homocysteine    5. Vitamin D deficiency  Assessment & Plan:  Over replaced and will need to reduce supplement           FOLLOW-UP:  Return for Next scheduled follow up.      Electronically signed by:    Sylvester Roman MD

## 2024-01-06 NOTE — ASSESSMENT & PLAN NOTE
Worsening and will start aricept 5 mg per day and refer to Dr Nance. In one month if doing well maintain dose and if needs further improvement likely increase dose to 10 mg

## 2024-01-15 RX ORDER — ATORVASTATIN CALCIUM 10 MG/1
10 TABLET, FILM COATED ORAL
Qty: 90 TABLET | Refills: 1 | Status: SHIPPED | OUTPATIENT
Start: 2024-01-15

## 2024-02-03 DIAGNOSIS — R41.3 MEMORY LOSS: ICD-10-CM

## 2024-02-05 RX ORDER — DONEPEZIL HYDROCHLORIDE 5 MG/1
5 TABLET, FILM COATED ORAL NIGHTLY
Qty: 90 TABLET | Refills: 1 | Status: SHIPPED | OUTPATIENT
Start: 2024-02-05

## 2024-02-19 RX ORDER — LEVOTHYROXINE SODIUM 0.03 MG/1
25 TABLET ORAL
Qty: 90 TABLET | Refills: 1 | Status: SHIPPED | OUTPATIENT
Start: 2024-02-19

## 2024-04-02 ENCOUNTER — TELEPHONE (OUTPATIENT)
Dept: INTERNAL MEDICINE | Facility: CLINIC | Age: 79
End: 2024-04-02

## 2024-04-02 RX ORDER — CEPHALEXIN 500 MG/1
500 CAPSULE ORAL 2 TIMES DAILY
Qty: 14 CAPSULE | Refills: 0 | Status: SHIPPED | OUTPATIENT
Start: 2024-04-02 | End: 2024-04-09

## 2024-04-02 NOTE — TELEPHONE ENCOUNTER
Dr. Darrin Pate is also in  [place] and saw finger. He looked at it, said it was infected, and to call us for medication.

## 2024-04-02 NOTE — TELEPHONE ENCOUNTER
"Caller: Sandy Brownlee \"Agueda\"    Relationship: Self    Best call back number: [phone]     What medication are you requesting: SOMETHING TO HELP WITH INFECTED THUMB NAIL ON RIGHT HAND.     PATIENT FIRST NOTICED THIS TODAY 4/2/24 AND IS NOT AWARE OF ANY TRAUMA OR INJURY TO THE SITE IN QUESTION.    If a prescription is needed, what is your preferred pharmacy and phone number: [id number] [place], [place] - [address] AT [facility] [address] [facility] [phone] [facility] [phone] FX     Additional notes: PLEASE NOTIFY PATIENT WHEN A PRESCRIPTION HAS BEEN CALLED IN OR IF THERE ARE ANY QUESTIONS/CONCERNS.     "

## 2024-04-16 ENCOUNTER — OFFICE VISIT (OUTPATIENT)
Dept: INTERNAL MEDICINE | Facility: CLINIC | Age: 79
End: 2024-04-16
Payer: MEDICARE

## 2024-04-16 VITALS
WEIGHT: 144 LBS | HEART RATE: 86 BPM | TEMPERATURE: 97.5 F | DIASTOLIC BLOOD PRESSURE: 52 MMHG | HEIGHT: 67 IN | SYSTOLIC BLOOD PRESSURE: 102 MMHG | BODY MASS INDEX: 22.6 KG/M2

## 2024-04-16 DIAGNOSIS — I73.9 PERIPHERAL VASCULAR DISEASE: ICD-10-CM

## 2024-04-16 DIAGNOSIS — E44.1 MILD PROTEIN-CALORIE MALNUTRITION: ICD-10-CM

## 2024-04-16 DIAGNOSIS — E78.5 HYPERLIPIDEMIA LDL GOAL <100: ICD-10-CM

## 2024-04-16 DIAGNOSIS — E55.9 VITAMIN D DEFICIENCY: ICD-10-CM

## 2024-04-16 DIAGNOSIS — I10 BENIGN ESSENTIAL HYPERTENSION: Primary | ICD-10-CM

## 2024-04-16 DIAGNOSIS — M25.552 PAIN OF BOTH HIP JOINTS: ICD-10-CM

## 2024-04-16 DIAGNOSIS — J96.01 ACUTE RESPIRATORY FAILURE WITH HYPOXIA: ICD-10-CM

## 2024-04-16 DIAGNOSIS — R41.3 MEMORY LOSS: ICD-10-CM

## 2024-04-16 DIAGNOSIS — M25.551 PAIN OF BOTH HIP JOINTS: ICD-10-CM

## 2024-04-16 DIAGNOSIS — R73.01 IMPAIRED FASTING GLUCOSE: ICD-10-CM

## 2024-04-16 PROCEDURE — 3078F DIAST BP <80 MM HG: CPT | Performed by: INTERNAL MEDICINE

## 2024-04-16 PROCEDURE — 1160F RVW MEDS BY RX/DR IN RCRD: CPT | Performed by: INTERNAL MEDICINE

## 2024-04-16 PROCEDURE — 3074F SYST BP LT 130 MM HG: CPT | Performed by: INTERNAL MEDICINE

## 2024-04-16 PROCEDURE — 99214 OFFICE O/P EST MOD 30 MIN: CPT | Performed by: INTERNAL MEDICINE

## 2024-04-16 PROCEDURE — 1159F MED LIST DOCD IN RCRD: CPT | Performed by: INTERNAL MEDICINE

## 2024-04-16 PROCEDURE — G2211 COMPLEX E/M VISIT ADD ON: HCPCS | Performed by: INTERNAL MEDICINE

## 2024-04-16 NOTE — ASSESSMENT & PLAN NOTE
Hypertension is stable and controlled  Continue current treatment regimen.  Blood pressure will be reassessed in 1 month.

## 2024-04-16 NOTE — PROGRESS NOTES
"Chief Complaint   Patient presents with    Nail Problem     Was given cephalexin but quit taking once it started getting better       History of Present Illness  78 y.o. female presents for follow up of nail cracking    Review of Systems   Skin:         Nail cracking      .    PMSFH:  The following portions of the patient's history were reviewed and updated as appropriate: allergies, current medications, past family history, past medical history, past social history, past surgical history and problem list.      Current Outpatient Medications:     atorvastatin (LIPITOR) 10 MG tablet, TAKE 1 TABLET BY MOUTH EVERY NIGHT AT BEDTIME, Disp: 90 tablet, Rfl: 1    cholecalciferol (VITAMIN D3) 250 MCG (69420 UT) capsule, Take 1 capsule by mouth Daily., Disp: , Rfl:     donepezil (ARICEPT) 5 MG tablet, TAKE 1 TABLET BY MOUTH EVERY NIGHT, Disp: 90 tablet, Rfl: 1    estradiol (ESTRACE) 0.1 MG/GM vaginal cream, Apply fingertip amount to urethral meatus daily, Disp: , Rfl:     l-methylfolate-algae-B6-B12 (METANX) 3-90.314-2-35 MG capsule capsule, Take 1 capsule by mouth 2 (Two) Times a Day., Disp: 180 capsule, Rfl: 3    levothyroxine (SYNTHROID, LEVOTHROID) 25 MCG tablet, TAKE 1 TABLET BY MOUTH EVERY MORNING, Disp: 90 tablet, Rfl: 1    lidocaine (XYLOCAINE) 5 % ointment, APPLY TO AFFECTED AREA BY TOPICAL ROUTE 1-4 TIMES DAILY AS NEEDED, Disp: , Rfl:     lisinopril-hydrochlorothiazide (PRINZIDE,ZESTORETIC) 20-12.5 MG per tablet, TAKE 1 TABLET BY MOUTH DAILY, Disp: 90 tablet, Rfl: 1    multivitamin with minerals tablet tablet, Take 1 tablet by mouth Daily., Disp: , Rfl:     psyllium (KONSYL) 28.3 % pack pack, Take 1 packet by mouth Daily., Disp: , Rfl:     VITALS:  /52   Pulse 86   Temp 97.5 °F (36.4 °C)   Ht 170.2 cm (67.01\")   Wt 65.3 kg (144 lb)   BMI 22.55 kg/m²     Physical Exam  Constitutional:       Appearance: Normal appearance.   Cardiovascular:      Rate and Rhythm: Normal rate and regular rhythm.   Pulmonary: "      Effort: Pulmonary effort is normal.   Abdominal:      Palpations: Abdomen is soft.      Tenderness: There is no abdominal tenderness.   Skin:     Comments: Cracked fingernail healing    Neurological:      General: No focal deficit present.      Mental Status: She is alert and oriented to person, place, and time.   Psychiatric:         Mood and Affect: Mood normal.         Behavior: Behavior normal.         LABS:    CMP:  Lab Results   Component Value Date    BUN 15 01/05/2024    CREATININE 0.93 01/05/2024    EGFRIFNONA 78 12/14/2021     01/05/2024    K 4.5 01/05/2024    CL 99 01/05/2024    CALCIUM 10.2 01/05/2024    ALBUMIN 4.5 01/05/2024    BILITOT 0.4 01/05/2024    ALKPHOS 69 01/05/2024    AST 23 01/05/2024    ALT 21 01/05/2024     CBC:  Lab Results   Component Value Date    WBC 4.43 01/05/2024    RBC 3.87 01/05/2024    HGB 12.1 01/05/2024    HCT 35.9 01/05/2024    MCV 92.8 01/05/2024    MCH 31.3 01/05/2024    MCHC 33.7 01/05/2024    RDW 11.5 (L) 01/05/2024     01/05/2024     LIPID PANEL:  Lab Results   Component Value Date    TRIG 96 01/05/2024    HDL 78 (H) 01/05/2024    VLDL 17 01/05/2024    LDL 71 01/05/2024    LDLHDL 0.88 01/05/2024     HGBA1C (LAST 3):  Lab Results   Component Value Date    HGBA1C 5.30 01/05/2024    HGBA1C 5.90 (H) 04/25/2023    HGBA1C 5.20 01/04/2023     HgB (LAST 3):   Hemoglobin   Date/Time Value Ref Range Status   01/05/2024 0841 12.1 12.0 - 15.9 g/dL Final   01/01/2024 1025 13.6 12.0 - 17.0 g/dL Final     Comment:     Serial Number: [id number]:  [id number]   01/01/2024 1019 13.2 12.0 - 15.9 g/dL Final   04/25/2023 0858 13.0 12.0 - 15.9 g/dL Final     Procedures         ASSESSMENT/PLAN:  Diagnoses and all orders for this visit:    1. Benign essential hypertension (Primary)  Assessment & Plan:  Hypertension is stable and controlled  Continue current treatment regimen.  Blood pressure will be reassessed in 1 month.    Orders:  -     Microalbumin / Creatinine Urine Ratio -  Urine, Clean Catch; Future    2. Impaired fasting glucose  Assessment & Plan:  Follow labs and watch bad carbs     Orders:  -     Hemoglobin A1c; Future    3. Memory loss  Assessment & Plan:  Stable and follow labs    Orders:  -     Vitamin B12; Future  -     Folate; Future    4. Mild protein-calorie malnutrition  Comments:  Follow labs  Orders:  -     Prealbumin; Future    5. Acute respiratory failure with hypoxia  Assessment & Plan:  Had severe hospitalization and has gradually returned closer to baseline      6. Peripheral vascular disease  Assessment & Plan:  On atorvastatin and consider baby aspirin       7. Pain of both hip joints  Assessment & Plan:  Follow labs     Orders:  -     C-reactive Protein; Future  -     Sedimentation Rate; Future  -     Uric Acid; Future    8. Vitamin D deficiency  Assessment & Plan:  Check labs     Orders:  -     Vitamin D,25-Hydroxy; Future    9. Hyperlipidemia LDL goal <100  -     Homocysteine; Future  -     CBC & Differential; Future  -     Comprehensive Metabolic Panel; Future  -     Lipid Panel; Future  -     TSH Rfx On Abnormal To Free T4; Future        FOLLOW-UP:  Return for Next scheduled follow up.      Electronically signed by:    Sylvester Roman MD

## 2024-04-22 ENCOUNTER — HOSPITAL ENCOUNTER (OUTPATIENT)
Dept: GENERAL RADIOLOGY | Facility: HOSPITAL | Age: 79
Discharge: HOME | End: 2024-04-22
Admitting: INTERNAL MEDICINE
Payer: MEDICARE

## 2024-04-22 ENCOUNTER — OFFICE VISIT (OUTPATIENT)
Dept: INTERNAL MEDICINE | Facility: CLINIC | Age: 79
End: 2024-04-22
Payer: MEDICARE

## 2024-04-22 ENCOUNTER — TELEPHONE (OUTPATIENT)
Dept: INTERNAL MEDICINE | Facility: CLINIC | Age: 79
End: 2024-04-22

## 2024-04-22 VITALS
DIASTOLIC BLOOD PRESSURE: 58 MMHG | HEART RATE: 82 BPM | SYSTOLIC BLOOD PRESSURE: 128 MMHG | BODY MASS INDEX: 22.6 KG/M2 | WEIGHT: 144 LBS | TEMPERATURE: 97.8 F | HEIGHT: 67 IN

## 2024-04-22 DIAGNOSIS — R05.1 ACUTE COUGH: ICD-10-CM

## 2024-04-22 DIAGNOSIS — R41.3 MEMORY LOSS: ICD-10-CM

## 2024-04-22 DIAGNOSIS — R05.1 ACUTE COUGH: Primary | ICD-10-CM

## 2024-04-22 DIAGNOSIS — I10 BENIGN ESSENTIAL HYPERTENSION: ICD-10-CM

## 2024-04-22 LAB
EXPIRATION DATE: NORMAL
FLUAV AG UPPER RESP QL IA.RAPID: NOT DETECTED
FLUBV AG UPPER RESP QL IA.RAPID: NOT DETECTED
INTERNAL CONTROL: NORMAL
Lab: NORMAL
SARS-COV-2 AG UPPER RESP QL IA.RAPID: NOT DETECTED

## 2024-04-22 PROCEDURE — 99214 OFFICE O/P EST MOD 30 MIN: CPT | Performed by: INTERNAL MEDICINE

## 2024-04-22 PROCEDURE — 3074F SYST BP LT 130 MM HG: CPT | Performed by: INTERNAL MEDICINE

## 2024-04-22 PROCEDURE — 71046 X-RAY EXAM CHEST 2 VIEWS: CPT

## 2024-04-22 PROCEDURE — 3078F DIAST BP <80 MM HG: CPT | Performed by: INTERNAL MEDICINE

## 2024-04-22 PROCEDURE — 87428 SARSCOV & INF VIR A&B AG IA: CPT | Performed by: INTERNAL MEDICINE

## 2024-04-22 PROCEDURE — 1159F MED LIST DOCD IN RCRD: CPT | Performed by: INTERNAL MEDICINE

## 2024-04-22 PROCEDURE — G2211 COMPLEX E/M VISIT ADD ON: HCPCS | Performed by: INTERNAL MEDICINE

## 2024-04-22 PROCEDURE — 1160F RVW MEDS BY RX/DR IN RCRD: CPT | Performed by: INTERNAL MEDICINE

## 2024-04-22 NOTE — PROGRESS NOTES
Chief Complaint   Patient presents with    Cough     Not productive, started yesterday    URI       History of Present Illness  78 y.o. female presents for evaluation of acute cough and generally non productive.     Review of Systems   Constitutional:  Negative for chills and diaphoresis.   HENT:  Positive for postnasal drip and sinus pressure.    Respiratory:  Positive for cough. Negative for shortness of breath and wheezing.    Psychiatric/Behavioral:  Positive for decreased concentration and sleep disturbance.      .    PMSFH:  The following portions of the patient's history were reviewed and updated as appropriate: allergies, current medications, past family history, past medical history, past social history, past surgical history and problem list.      Current Outpatient Medications:     atorvastatin (LIPITOR) 10 MG tablet, TAKE 1 TABLET BY MOUTH EVERY NIGHT AT BEDTIME, Disp: 90 tablet, Rfl: 1    cholecalciferol (VITAMIN D3) 250 MCG (79745 UT) capsule, Take 1 capsule by mouth Daily., Disp: , Rfl:     donepezil (ARICEPT) 5 MG tablet, TAKE 1 TABLET BY MOUTH EVERY NIGHT, Disp: 90 tablet, Rfl: 1    estradiol (ESTRACE) 0.1 MG/GM vaginal cream, Apply fingertip amount to urethral meatus daily, Disp: , Rfl:     l-methylfolate-algae-B6-B12 (METANX) 3-90.314-2-35 MG capsule capsule, Take 1 capsule by mouth 2 (Two) Times a Day., Disp: 180 capsule, Rfl: 3    levothyroxine (SYNTHROID, LEVOTHROID) 25 MCG tablet, TAKE 1 TABLET BY MOUTH EVERY MORNING, Disp: 90 tablet, Rfl: 1    lidocaine (XYLOCAINE) 5 % ointment, APPLY TO AFFECTED AREA BY TOPICAL ROUTE 1-4 TIMES DAILY AS NEEDED, Disp: , Rfl:     lisinopril-hydrochlorothiazide (PRINZIDE,ZESTORETIC) 20-12.5 MG per tablet, TAKE 1 TABLET BY MOUTH DAILY, Disp: 90 tablet, Rfl: 1    multivitamin with minerals tablet tablet, Take 1 tablet by mouth Daily., Disp: , Rfl:     psyllium (KONSYL) 28.3 % pack pack, Take 1 packet by mouth Daily., Disp: , Rfl:      "brompheniramine-pseudoephedrine-DM 30-2-10 MG/5ML syrup, Take 5 mL by mouth 4 (Four) Times a Day As Needed for Cough for up to 10 days., Disp: 240 mL, Rfl: 0    VITALS:  /58   Pulse 82   Temp 97.8 °F (36.6 °C)   Ht 170.2 cm (67.01\")   Wt 65.3 kg (144 lb)   BMI 22.55 kg/m²     Physical Exam  Constitutional:       Appearance: Normal appearance.   HENT:      Right Ear: Tympanic membrane and ear canal normal.      Left Ear: Tympanic membrane and ear canal normal.   Cardiovascular:      Rate and Rhythm: Normal rate and regular rhythm.   Pulmonary:      Effort: Pulmonary effort is normal.      Breath sounds: No wheezing or rales.   Lymphadenopathy:      Cervical: Cervical adenopathy present.   Neurological:      General: No focal deficit present.      Mental Status: She is alert and oriented to person, place, and time.   Psychiatric:         Mood and Affect: Mood normal.         Behavior: Behavior normal.         LABS:    CMP:  Lab Results   Component Value Date    BUN 15 01/05/2024    CREATININE 0.93 01/05/2024    EGFRIFNONA 78 12/14/2021     01/05/2024    K 4.5 01/05/2024    CL 99 01/05/2024    CALCIUM 10.2 01/05/2024    ALBUMIN 4.5 01/05/2024    BILITOT 0.4 01/05/2024    ALKPHOS 69 01/05/2024    AST 23 01/05/2024    ALT 21 01/05/2024     CBC:  Lab Results   Component Value Date    WBC 4.43 01/05/2024    RBC 3.87 01/05/2024    HGB 12.1 01/05/2024    HCT 35.9 01/05/2024    MCV 92.8 01/05/2024    MCH 31.3 01/05/2024    MCHC 33.7 01/05/2024    RDW 11.5 (L) 01/05/2024     01/05/2024     HGBA1C(LAST 2):  Lab Results   Component Value Date    HGBA1C 5.30 01/05/2024    HGBA1C 5.90 (H) 04/25/2023     Procedures         ASSESSMENT/PLAN:  Diagnoses and all orders for this visit:    1. Acute cough (Primary)  Assessment & Plan:  Checked flu and covid and negative. Proceeded  with chest xray that was ok Use cough syrup     Orders:  -     POCT SARS-CoV-2 Antigen MAGDALENE + Flu  -     XR Chest PA & Lateral; " Future    2. Memory loss  Assessment & Plan:  Follow labs next month      3. Benign essential hypertension  Assessment & Plan:  Hypertension is stable and controlled  Continue current treatment regimen.  Blood pressure will be reassessed in 1 month.      Other orders  -     brompheniramine-pseudoephedrine-DM 30-2-10 MG/5ML syrup; Take 5 mL by mouth 4 (Four) Times a Day As Needed for Cough for up to 10 days.  Dispense: 240 mL; Refill: 0        FOLLOW-UP:  Return for Next scheduled follow up.      Electronically signed by:    Sylvester Roman MD

## 2024-04-23 ENCOUNTER — TELEPHONE (OUTPATIENT)
Dept: INTERNAL MEDICINE | Facility: CLINIC | Age: 79
End: 2024-04-23
Payer: MEDICARE

## 2024-04-23 ENCOUNTER — TELEPHONE (OUTPATIENT)
Dept: INTERNAL MEDICINE | Facility: CLINIC | Age: 79
End: 2024-04-23

## 2024-04-23 RX ORDER — ATORVASTATIN CALCIUM 10 MG/1
10 TABLET, FILM COATED ORAL
Qty: 90 TABLET | Refills: 1 | Status: SHIPPED | OUTPATIENT
Start: 2024-04-23

## 2024-04-23 RX ORDER — AZITHROMYCIN 250 MG/1
250 TABLET, FILM COATED ORAL TAKE AS DIRECTED
Qty: 6 TABLET | Refills: 0 | Status: SHIPPED | OUTPATIENT
Start: 2024-04-23 | End: 2024-04-28

## 2024-04-23 RX ORDER — BROMPHENIRAMINE MALEATE, PSEUDOEPHEDRINE HYDROCHLORIDE, AND DEXTROMETHORPHAN HYDROBROMIDE 2; 30; 10 MG/5ML; MG/5ML; MG/5ML
5 SYRUP ORAL 4 TIMES DAILY PRN
Qty: 240 ML | Refills: 0 | Status: SHIPPED | OUTPATIENT
Start: 2024-04-23 | End: 2024-05-03

## 2024-04-23 NOTE — TELEPHONE ENCOUNTER
Pt was advised and verbalized understanding.     Postop check s/p L4-S1 posterior fusion    Pt seen at bedside. States her leg pain is improved but has R big toe numbness. Denies other numbness or tingling, fevers, chills, CP, SOB, N/V/D.    Vital Signs (24 Hrs):  T(C): 37.1 (10-25-23 @ 12:38), Max: 37.1 (10-25-23 @ 12:38)  HR: 75 (10-25-23 @ 13:37) (70 - 77)  BP: 115/61 (10-25-23 @ 13:37) (98/51 - 128/75)  RR: 15 (10-25-23 @ 13:37) (13 - 16)  SpO2: 96% (10-25-23 @ 13:37) (96% - 98%)  Wt(kg): --    LABS:                          8.6    8.93  )-----------( 137      ( 25 Oct 2023 13:25 )             26.0     10-25    140  |  109<H>  |  46<H>  ----------------------------<  191<H>  4.4   |  21<L>  |  2.00<H>    Ca    8.6      25 Oct 2023 13:25    Physical Exam:   General: NAD, patient laying in bed comfortably  Calves nontender  Compartments compressible, soft    Spine:  NTTP of C-,T-,L-Spine, no step offs  Dressings C/D/I  XIMENA Drain in place    Motor:       C5   C6   C7   C8   T1  L   5/5  5/5  5/5  5/5  5/5  R   5/5  5/5  5/5  5/5  5/5         L2   L3    L4   L5   S1  L   5/5  5/5  5/5  5/5  5/5  R   5/5  5/5  5/5  1/5  5/5    Sensory:       C5   C6   C7   C8   T1  L    2     2     2     2     2   R    2     2     2     2     2          L2   L3    L4   L5   S1  L    2     2     2     2     2   R    2     2     2     1     1     No pain with SLR, no clonus, no babinksi, no ureña  Rectal Exam: sensation intact in the perianal and saddle area; normal passive/active tone    A/P:    70F POD0 L4-S1 posterior fusion    WBAT, abdominal binder for comfort  PT/OT  Follow up AM labs  Analgesics  No chemical DVT PPx, SCDs ok  Incentive spirometry  Dispo: [facility] per pt's request  Appreciate medical recs  Will discuss plan with Dr. Hernandez and notify primary team of any changes to plan

## 2024-04-23 NOTE — TELEPHONE ENCOUNTER
"Caller: Barb Mooney \"Agueda\"    Relationship: Self    Best call back number: [phone]     Caller requesting test results: BARB MOONEY     What test was performed: CHEST X-RAY     When was the test performed: 4/22/24     Where was the test performed: [facility]     Additional notes: PLEASE CALL PATIENT BACK TO DISCUSS RESULTS.         "

## 2024-04-23 NOTE — TELEPHONE ENCOUNTER
"Caller: Sandy Brownlee \"Agueda\"    Relationship: Self    Best call back number: [phone]     What medication are you requesting: SOMETHING FOR SYMPTOMS     What are your current symptoms: COUGH, HEADACHE, SORE THROAT     How long have you been experiencing symptoms: A WEEK     Have you had these symptoms before:    [] Yes  [x] No    Have you been treated for these symptoms before:   [] Yes  [x] No    If a prescription is needed, what is your preferred pharmacy and phone number: [id number] - [place], [place] - [address] AT [facility] [address] [facility] [phone] [facility] [phone]      Additional notes:  PATIENT STATES SHE WAS SEEN YESTERDAY BY DR. ALBARADO AND HE DID NOT THINK SHE NEEDED ANY MEDICATIONS BUT SHE IS FEELING WORSE TODAY AND WOULD LIKE A MEDICATION.         "

## 2024-04-23 NOTE — TELEPHONE ENCOUNTER
"Caller: Sandy Brownlee \"Agueda\"    Relationship: Self    Best call back number: [phone]     What was the call regarding: PATIENT STATES SHE WILL NEED SOMETHING STRONGER THAN A COUGH SYRUP AND WOULD LIKE TO KNOW IF SHE CAN GET A ZPAK PRESCRIBED IF AT ALL POSSIBLE. SHE IS LEAVING TO GO OUT OF TOWN FRIDAY 4/26/24.           "

## 2024-04-23 NOTE — ADDENDUM NOTE
Addended by: LOPEZ ALBARADO on: 4/23/2024 10:04 AM     Modules accepted: Orders, Level of Service

## 2024-04-25 ENCOUNTER — TELEPHONE (OUTPATIENT)
Dept: INTERNAL MEDICINE | Facility: CLINIC | Age: 79
End: 2024-04-25

## 2024-04-25 NOTE — TELEPHONE ENCOUNTER
"  Caller: Sandy Brownlee \"Agueda\"    Relationship: Self    Best call back number: [phone]     Who are you requesting to speak with (clinical staff, provider,  specific staff member): DR ALBARADO OR CLINICAL      What was the call regarding: X-RAY RESULTS. PATIENT ADVISED HAS SOMETHING WRONG WITH HER CHEST AND NEEDS RESULTS TODAY DUE TO FLYING OUT OF TOWN IN THE MORNING.  PLEASE CALL HER TO ADVISE.  THANK YOU      "

## 2024-04-25 NOTE — TELEPHONE ENCOUNTER
PT IS GOING OUT OF TOWN TOMORROW MORNING AND NEEDS A CALL BACK ABOUT THE CHEST X-RAY THAT WAS COMPLETED ON 04/22/24.

## 2024-04-30 ENCOUNTER — TELEPHONE (OUTPATIENT)
Dept: INTERNAL MEDICINE | Facility: CLINIC | Age: 79
End: 2024-04-30
Payer: MEDICARE

## 2024-04-30 DIAGNOSIS — R05.1 ACUTE COUGH: Primary | ICD-10-CM

## 2024-04-30 NOTE — TELEPHONE ENCOUNTER
Pt states she was told by Dr. Roman that if she still wasn't any better by the time she got back from her trip to let him know so that they could do a repeat xray.

## 2024-04-30 NOTE — TELEPHONE ENCOUNTER
"    Caller: Sandy Brownlee \"Agueda\"    Relationship: Self    Best call back number:[phone]    What is the medical concern/diagnosis: PT CALLED TO CHECK STATUS FOR CHEST XRAY   "

## 2024-05-01 ENCOUNTER — HOSPITAL ENCOUNTER (OUTPATIENT)
Dept: GENERAL RADIOLOGY | Facility: HOSPITAL | Age: 79
Discharge: HOME | End: 2024-05-01
Admitting: INTERNAL MEDICINE
Payer: MEDICARE

## 2024-05-01 DIAGNOSIS — R05.1 ACUTE COUGH: ICD-10-CM

## 2024-05-01 PROCEDURE — 71046 X-RAY EXAM CHEST 2 VIEWS: CPT

## 2024-05-02 ENCOUNTER — TELEPHONE (OUTPATIENT)
Dept: INTERNAL MEDICINE | Facility: CLINIC | Age: 79
End: 2024-05-02
Payer: MEDICARE

## 2024-05-02 NOTE — TELEPHONE ENCOUNTER
Please inform her that her chest xray was ok and if having worsening symptoms recommend see someone in our office soon

## 2024-05-08 ENCOUNTER — OFFICE VISIT (OUTPATIENT)
Dept: INTERNAL MEDICINE | Facility: CLINIC | Age: 79
End: 2024-05-08
Payer: MEDICARE

## 2024-05-08 VITALS
HEART RATE: 76 BPM | SYSTOLIC BLOOD PRESSURE: 126 MMHG | HEIGHT: 67 IN | DIASTOLIC BLOOD PRESSURE: 72 MMHG | BODY MASS INDEX: 22.44 KG/M2 | WEIGHT: 143 LBS | TEMPERATURE: 97.7 F

## 2024-05-08 DIAGNOSIS — H61.23 BILATERAL IMPACTED CERUMEN: ICD-10-CM

## 2024-05-08 DIAGNOSIS — R05.2 SUBACUTE COUGH: ICD-10-CM

## 2024-05-08 DIAGNOSIS — E78.5 HYPERLIPIDEMIA LDL GOAL <100: ICD-10-CM

## 2024-05-08 DIAGNOSIS — Z00.00 MEDICARE ANNUAL WELLNESS VISIT, SUBSEQUENT: Primary | ICD-10-CM

## 2024-05-08 DIAGNOSIS — Z85.828 HISTORY OF SQUAMOUS CELL CARCINOMA OF SKIN: ICD-10-CM

## 2024-05-08 DIAGNOSIS — R41.3 MEMORY LOSS: ICD-10-CM

## 2024-05-08 DIAGNOSIS — I10 BENIGN ESSENTIAL HYPERTENSION: ICD-10-CM

## 2024-05-08 DIAGNOSIS — R79.89 ELEVATED HOMOCYSTEINE: ICD-10-CM

## 2024-05-08 DIAGNOSIS — I73.9 PERIPHERAL VASCULAR DISEASE: ICD-10-CM

## 2024-05-08 DIAGNOSIS — R06.09 DYSPNEA ON EXERTION: ICD-10-CM

## 2024-05-08 DIAGNOSIS — R73.01 IMPAIRED FASTING GLUCOSE: ICD-10-CM

## 2024-05-08 PROCEDURE — 1159F MED LIST DOCD IN RCRD: CPT | Performed by: INTERNAL MEDICINE

## 2024-05-08 PROCEDURE — 3074F SYST BP LT 130 MM HG: CPT | Performed by: INTERNAL MEDICINE

## 2024-05-08 PROCEDURE — 1160F RVW MEDS BY RX/DR IN RCRD: CPT | Performed by: INTERNAL MEDICINE

## 2024-05-08 PROCEDURE — G0439 PPPS, SUBSEQ VISIT: HCPCS | Performed by: INTERNAL MEDICINE

## 2024-05-08 PROCEDURE — 3078F DIAST BP <80 MM HG: CPT | Performed by: INTERNAL MEDICINE

## 2024-05-08 PROCEDURE — 1170F FXNL STATUS ASSESSED: CPT | Performed by: INTERNAL MEDICINE

## 2024-05-08 PROCEDURE — 1125F AMNT PAIN NOTED PAIN PRSNT: CPT | Performed by: INTERNAL MEDICINE

## 2024-05-08 PROCEDURE — 99214 OFFICE O/P EST MOD 30 MIN: CPT | Performed by: INTERNAL MEDICINE

## 2024-05-08 PROCEDURE — 93000 ELECTROCARDIOGRAM COMPLETE: CPT | Performed by: INTERNAL MEDICINE

## 2024-05-10 ENCOUNTER — LAB (OUTPATIENT)
Dept: LAB | Facility: HOSPITAL | Age: 79
End: 2024-05-10
Payer: MEDICARE

## 2024-05-10 DIAGNOSIS — M25.551 PAIN OF BOTH HIP JOINTS: ICD-10-CM

## 2024-05-10 DIAGNOSIS — R06.09 DYSPNEA ON EXERTION: ICD-10-CM

## 2024-05-10 DIAGNOSIS — E78.5 HYPERLIPIDEMIA LDL GOAL <100: ICD-10-CM

## 2024-05-10 DIAGNOSIS — E44.1 MILD PROTEIN-CALORIE MALNUTRITION: ICD-10-CM

## 2024-05-10 DIAGNOSIS — E55.9 VITAMIN D DEFICIENCY: ICD-10-CM

## 2024-05-10 DIAGNOSIS — M25.552 PAIN OF BOTH HIP JOINTS: ICD-10-CM

## 2024-05-10 DIAGNOSIS — R41.3 MEMORY LOSS: ICD-10-CM

## 2024-05-10 DIAGNOSIS — R73.01 IMPAIRED FASTING GLUCOSE: ICD-10-CM

## 2024-05-10 LAB
25(OH)D3 SERPL-MCNC: 93.1 NG/ML (ref 30–100)
ALBUMIN SERPL-MCNC: 4.3 G/DL (ref 3.5–5.2)
ALBUMIN/GLOB SERPL: 1.7 G/DL
ALP SERPL-CCNC: 80 U/L (ref 39–117)
ALT SERPL W P-5'-P-CCNC: 14 U/L (ref 1–33)
ANION GAP SERPL CALCULATED.3IONS-SCNC: 10.2 MMOL/L (ref 5–15)
AST SERPL-CCNC: 24 U/L (ref 1–32)
BASOPHILS # BLD AUTO: 0.04 10*3/MM3 (ref 0–0.2)
BASOPHILS NFR BLD AUTO: 0.8 % (ref 0–1.5)
BILIRUB SERPL-MCNC: 0.6 MG/DL (ref 0–1.2)
BUN SERPL-MCNC: 8 MG/DL (ref 8–23)
BUN/CREAT SERPL: 9.1 (ref 7–25)
CALCIUM SPEC-SCNC: 9.8 MG/DL (ref 8.6–10.5)
CHLORIDE SERPL-SCNC: 101 MMOL/L (ref 98–107)
CHOLEST SERPL-MCNC: 174 MG/DL (ref 0–200)
CO2 SERPL-SCNC: 24.8 MMOL/L (ref 22–29)
CREAT SERPL-MCNC: 0.88 MG/DL (ref 0.57–1)
CRP SERPL-MCNC: <0.3 MG/DL (ref 0–0.5)
DEPRECATED RDW RBC AUTO: 42.6 FL (ref 37–54)
EGFRCR SERPLBLD CKD-EPI 2021: 67.4 ML/MIN/1.73
EOSINOPHIL # BLD AUTO: 0.2 10*3/MM3 (ref 0–0.4)
EOSINOPHIL NFR BLD AUTO: 4 % (ref 0.3–6.2)
ERYTHROCYTE [DISTWIDTH] IN BLOOD BY AUTOMATED COUNT: 12.2 % (ref 12.3–15.4)
ERYTHROCYTE [SEDIMENTATION RATE] IN BLOOD: 12 MM/HR (ref 0–30)
FOLATE SERPL-MCNC: >20 NG/ML (ref 4.78–24.2)
GLOBULIN UR ELPH-MCNC: 2.5 GM/DL
GLUCOSE SERPL-MCNC: 87 MG/DL (ref 65–99)
HBA1C MFR BLD: 5.3 % (ref 4.8–5.6)
HCT VFR BLD AUTO: 38 % (ref 34–46.6)
HCYS SERPL-MCNC: 9.2 UMOL/L (ref 0–15)
HDLC SERPL-MCNC: 91 MG/DL (ref 40–60)
HGB BLD-MCNC: 13 G/DL (ref 12–15.9)
IMM GRANULOCYTES # BLD AUTO: 0.01 10*3/MM3 (ref 0–0.05)
IMM GRANULOCYTES NFR BLD AUTO: 0.2 % (ref 0–0.5)
LDLC SERPL CALC-MCNC: 70 MG/DL (ref 0–100)
LDLC/HDLC SERPL: 0.75 {RATIO}
LYMPHOCYTES # BLD AUTO: 1.64 10*3/MM3 (ref 0.7–3.1)
LYMPHOCYTES NFR BLD AUTO: 33.2 % (ref 19.6–45.3)
MCH RBC QN AUTO: 32.9 PG (ref 26.6–33)
MCHC RBC AUTO-ENTMCNC: 34.2 G/DL (ref 31.5–35.7)
MCV RBC AUTO: 96.2 FL (ref 79–97)
MONOCYTES # BLD AUTO: 0.47 10*3/MM3 (ref 0.1–0.9)
MONOCYTES NFR BLD AUTO: 9.5 % (ref 5–12)
NEUTROPHILS NFR BLD AUTO: 2.58 10*3/MM3 (ref 1.7–7)
NEUTROPHILS NFR BLD AUTO: 52.3 % (ref 42.7–76)
NRBC BLD AUTO-RTO: 0 /100 WBC (ref 0–0.2)
NT-PROBNP SERPL-MCNC: 127 PG/ML (ref 0–1800)
PLATELET # BLD AUTO: 330 10*3/MM3 (ref 140–450)
PMV BLD AUTO: 9.5 FL (ref 6–12)
POTASSIUM SERPL-SCNC: 4.5 MMOL/L (ref 3.5–5.2)
PREALB SERPL-MCNC: 20.6 MG/DL (ref 20–40)
PROT SERPL-MCNC: 6.8 G/DL (ref 6–8.5)
RBC # BLD AUTO: 3.95 10*6/MM3 (ref 3.77–5.28)
SODIUM SERPL-SCNC: 136 MMOL/L (ref 136–145)
T4 FREE SERPL-MCNC: 1.21 NG/DL (ref 0.93–1.7)
TRIGL SERPL-MCNC: 72 MG/DL (ref 0–150)
TSH SERPL DL<=0.05 MIU/L-ACNC: 5.64 UIU/ML (ref 0.27–4.2)
URATE SERPL-MCNC: 5.2 MG/DL (ref 2.4–5.7)
VIT B12 BLD-MCNC: 1030 PG/ML (ref 211–946)
VLDLC SERPL-MCNC: 13 MG/DL (ref 5–40)
WBC NRBC COR # BLD AUTO: 4.94 10*3/MM3 (ref 3.4–10.8)

## 2024-05-10 PROCEDURE — 85025 COMPLETE CBC W/AUTO DIFF WBC: CPT

## 2024-05-10 PROCEDURE — 83036 HEMOGLOBIN GLYCOSYLATED A1C: CPT

## 2024-05-10 PROCEDURE — 84439 ASSAY OF FREE THYROXINE: CPT

## 2024-05-10 PROCEDURE — 83880 ASSAY OF NATRIURETIC PEPTIDE: CPT

## 2024-05-10 PROCEDURE — 36415 COLL VENOUS BLD VENIPUNCTURE: CPT

## 2024-05-10 PROCEDURE — 84550 ASSAY OF BLOOD/URIC ACID: CPT

## 2024-05-10 PROCEDURE — 80053 COMPREHEN METABOLIC PANEL: CPT

## 2024-05-10 PROCEDURE — 86140 C-REACTIVE PROTEIN: CPT

## 2024-05-10 PROCEDURE — 80061 LIPID PANEL: CPT

## 2024-05-10 PROCEDURE — 83090 ASSAY OF HOMOCYSTEINE: CPT

## 2024-05-10 PROCEDURE — 82607 VITAMIN B-12: CPT

## 2024-05-10 PROCEDURE — 85652 RBC SED RATE AUTOMATED: CPT

## 2024-05-10 PROCEDURE — 84134 ASSAY OF PREALBUMIN: CPT

## 2024-05-10 PROCEDURE — 82746 ASSAY OF FOLIC ACID SERUM: CPT

## 2024-05-10 PROCEDURE — 82306 VITAMIN D 25 HYDROXY: CPT

## 2024-05-10 PROCEDURE — 84443 ASSAY THYROID STIM HORMONE: CPT

## 2024-05-20 ENCOUNTER — TELEPHONE (OUTPATIENT)
Dept: INTERNAL MEDICINE | Facility: CLINIC | Age: 79
End: 2024-05-20

## 2024-05-20 DIAGNOSIS — E03.9 ACQUIRED HYPOTHYROIDISM: Primary | ICD-10-CM

## 2024-05-20 RX ORDER — LEVOTHYROXINE SODIUM 0.05 MG/1
50 TABLET ORAL DAILY
Qty: 30 TABLET | Refills: 2 | Status: SHIPPED | OUTPATIENT
Start: 2024-05-20

## 2024-05-20 NOTE — TELEPHONE ENCOUNTER
Please call and tell her we increased her thyroid dose and need to repeat thyroid labs in 3 months. Please confirm is taking 25 mcg per day and we sent in 50 mcg per day then read labs letter as all other labs good

## 2024-05-21 ENCOUNTER — TRANSCRIBE ORDERS (OUTPATIENT)
Dept: SPEECH THERAPY | Facility: HOSPITAL | Age: 79
End: 2024-05-21
Payer: MEDICARE

## 2024-05-21 DIAGNOSIS — G31.84 MILD COGNITIVE IMPAIRMENT: Primary | ICD-10-CM

## 2024-05-22 ENCOUNTER — TELEPHONE (OUTPATIENT)
Dept: INTERNAL MEDICINE | Facility: CLINIC | Age: 79
End: 2024-05-22
Payer: MEDICARE

## 2024-05-22 DIAGNOSIS — R41.3 MEMORY LOSS: Primary | ICD-10-CM

## 2024-05-22 DIAGNOSIS — R93.0 ABNORMAL CT OF THE HEAD: ICD-10-CM

## 2024-05-22 NOTE — TELEPHONE ENCOUNTER
"    Caller: Sandy Brownlee \"Agueda\"    Relationship: Self    Best call back number: [phone]    What orders are you requesting (i.e. lab or imaging): MRI    Additional notes: THE PATIENT STATES THAT DOCTOR ROSITA IN [place]  WANTS HER TO HAVE THE MRI DONE SHE NEEDS TO BE ABLE TO DO THAT IN [place] AND NEEDS DOCTOR VERENA TO TALK TO DOCTOR BECKER TO HELP GET THAT DONE           "

## 2024-05-24 ENCOUNTER — TELEPHONE (OUTPATIENT)
Dept: INTERNAL MEDICINE | Facility: CLINIC | Age: 79
End: 2024-05-24
Payer: MEDICARE

## 2024-05-24 ENCOUNTER — HOSPITAL ENCOUNTER (EMERGENCY)
Facility: HOSPITAL | Age: 79
Discharge: HOME | End: 2024-05-24
Attending: EMERGENCY MEDICINE
Payer: MEDICARE

## 2024-05-24 VITALS
SYSTOLIC BLOOD PRESSURE: 176 MMHG | OXYGEN SATURATION: 98 % | HEART RATE: 95 BPM | RESPIRATION RATE: 18 BRPM | DIASTOLIC BLOOD PRESSURE: 80 MMHG | HEIGHT: 67 IN | WEIGHT: 143 LBS | BODY MASS INDEX: 22.44 KG/M2 | TEMPERATURE: 98 F

## 2024-05-24 DIAGNOSIS — T65.91XA ACCIDENTAL INGESTION OF SUBSTANCE, INITIAL ENCOUNTER: Primary | ICD-10-CM

## 2024-05-24 DIAGNOSIS — R51.9 ACUTE NONINTRACTABLE HEADACHE, UNSPECIFIED HEADACHE TYPE: ICD-10-CM

## 2024-05-24 LAB
ALBUMIN SERPL-MCNC: 4.6 G/DL (ref 3.5–5.2)
ALBUMIN/GLOB SERPL: 1.7 G/DL
ALP SERPL-CCNC: 100 U/L (ref 39–117)
ALT SERPL W P-5'-P-CCNC: 14 U/L (ref 1–33)
ANION GAP SERPL CALCULATED.3IONS-SCNC: 13 MMOL/L (ref 5–15)
AST SERPL-CCNC: 30 U/L (ref 1–32)
BASOPHILS # BLD AUTO: 0.04 10*3/MM3 (ref 0–0.2)
BASOPHILS NFR BLD AUTO: 0.6 % (ref 0–1.5)
BILIRUB SERPL-MCNC: 0.6 MG/DL (ref 0–1.2)
BUN SERPL-MCNC: 11 MG/DL (ref 8–23)
BUN/CREAT SERPL: 12.1 (ref 7–25)
CALCIUM SPEC-SCNC: 9.7 MG/DL (ref 8.6–10.5)
CHLORIDE SERPL-SCNC: 93 MMOL/L (ref 98–107)
CO2 SERPL-SCNC: 28 MMOL/L (ref 22–29)
CREAT SERPL-MCNC: 0.91 MG/DL (ref 0.57–1)
DEPRECATED RDW RBC AUTO: 46.7 FL (ref 37–54)
EGFRCR SERPLBLD CKD-EPI 2021: 64.7 ML/MIN/1.73
EOSINOPHIL # BLD AUTO: 0.11 10*3/MM3 (ref 0–0.4)
EOSINOPHIL NFR BLD AUTO: 1.6 % (ref 0.3–6.2)
ERYTHROCYTE [DISTWIDTH] IN BLOOD BY AUTOMATED COUNT: 12.9 % (ref 12.3–15.4)
FLUAV RNA RESP QL NAA+PROBE: NOT DETECTED
FLUBV RNA RESP QL NAA+PROBE: NOT DETECTED
GLOBULIN UR ELPH-MCNC: 2.7 GM/DL
GLUCOSE SERPL-MCNC: 105 MG/DL (ref 65–99)
HCT VFR BLD AUTO: 41.3 % (ref 34–46.6)
HGB BLD-MCNC: 13.5 G/DL (ref 12–15.9)
IMM GRANULOCYTES # BLD AUTO: 0.02 10*3/MM3 (ref 0–0.05)
IMM GRANULOCYTES NFR BLD AUTO: 0.3 % (ref 0–0.5)
LYMPHOCYTES # BLD AUTO: 1.86 10*3/MM3 (ref 0.7–3.1)
LYMPHOCYTES NFR BLD AUTO: 27.4 % (ref 19.6–45.3)
MCH RBC QN AUTO: 32.2 PG (ref 26.6–33)
MCHC RBC AUTO-ENTMCNC: 32.7 G/DL (ref 31.5–35.7)
MCV RBC AUTO: 98.6 FL (ref 79–97)
MONOCYTES # BLD AUTO: 0.49 10*3/MM3 (ref 0.1–0.9)
MONOCYTES NFR BLD AUTO: 7.2 % (ref 5–12)
NEUTROPHILS NFR BLD AUTO: 4.28 10*3/MM3 (ref 1.7–7)
NEUTROPHILS NFR BLD AUTO: 62.9 % (ref 42.7–76)
NRBC BLD AUTO-RTO: 0 /100 WBC (ref 0–0.2)
PLATELET # BLD AUTO: 308 10*3/MM3 (ref 140–450)
PMV BLD AUTO: 8.8 FL (ref 6–12)
POTASSIUM SERPL-SCNC: 3.7 MMOL/L (ref 3.5–5.2)
PROT SERPL-MCNC: 7.3 G/DL (ref 6–8.5)
RBC # BLD AUTO: 4.19 10*6/MM3 (ref 3.77–5.28)
SARS-COV-2 RNA RESP QL NAA+PROBE: NOT DETECTED
SODIUM SERPL-SCNC: 134 MMOL/L (ref 136–145)
WBC NRBC COR # BLD AUTO: 6.8 10*3/MM3 (ref 3.4–10.8)

## 2024-05-24 PROCEDURE — 63710000001 ONDANSETRON ODT 4 MG TABLET DISPERSIBLE: Performed by: EMERGENCY MEDICINE

## 2024-05-24 PROCEDURE — 87636 SARSCOV2 & INF A&B AMP PRB: CPT | Performed by: EMERGENCY MEDICINE

## 2024-05-24 PROCEDURE — 36415 COLL VENOUS BLD VENIPUNCTURE: CPT

## 2024-05-24 PROCEDURE — 80053 COMPREHEN METABOLIC PANEL: CPT | Performed by: EMERGENCY MEDICINE

## 2024-05-24 PROCEDURE — 85025 COMPLETE CBC W/AUTO DIFF WBC: CPT | Performed by: EMERGENCY MEDICINE

## 2024-05-24 PROCEDURE — 99283 EMERGENCY DEPT VISIT LOW MDM: CPT

## 2024-05-24 PROCEDURE — 93005 ELECTROCARDIOGRAM TRACING: CPT | Performed by: EMERGENCY MEDICINE

## 2024-05-24 RX ORDER — ONDANSETRON 4 MG/1
4 TABLET, ORALLY DISINTEGRATING ORAL ONCE
Status: COMPLETED | OUTPATIENT
Start: 2024-05-24 | End: 2024-05-24

## 2024-05-24 RX ORDER — ACETAMINOPHEN 500 MG
1000 TABLET ORAL ONCE
Status: COMPLETED | OUTPATIENT
Start: 2024-05-24 | End: 2024-05-24

## 2024-05-24 RX ADMIN — ONDANSETRON 4 MG: 4 TABLET, ORALLY DISINTEGRATING ORAL at 16:56

## 2024-05-24 RX ADMIN — ACETAMINOPHEN 1000 MG: 500 TABLET ORAL at 16:55

## 2024-05-24 NOTE — TELEPHONE ENCOUNTER
Spoke with pt's daughter. She understood and verbalized understanding.     I advised our office closes early today, and to follow Dr. Garcia's recommendation. She says the pt has no other symptoms, and I advised if it gets worse or she develops other symptoms to reach out to our on-call physician or go to urgent care

## 2024-05-24 NOTE — ED PROVIDER NOTES
[place]    EMERGENCY DEPARTMENT ENCOUNTER      Pt Name: Sandy Brownlee  MRN: [id number]  YOB: 1945  Date of evaluation: 5/24/2024  Provider: Tomasz Lopez MD    CHIEF COMPLAINT       Chief Complaint   Patient presents with    Ingestion         HISTORY OF PRESENT ILLNESS   Sandy Brownlee is a 78 y.o. female who presents to the emergency department after inadvertently ingesting ant poison.  Patient states that she sprayed ant poison around her sink yesterday afternoon and then last night put her head under the faucet to take a drink.  She says that she could taste it and today feels shaky and has a headache and nausea.      Nursing notes were reviewed.    REVIEW OF SYSTEMS     ROS:  A chief complaint appropriate review of systems was completed and is negative except as noted in the HPI.      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    Current Outpatient Medications:     atorvastatin (LIPITOR) 10 MG tablet, TAKE 1 TABLET BY MOUTH EVERY NIGHT AT BEDTIME, Disp: 90 tablet, Rfl: 1    cholecalciferol (VITAMIN D3) 250 MCG (81388 UT) capsule, Take 1 capsule by mouth Daily., Disp: , Rfl:     donepezil (ARICEPT) 5 MG tablet, TAKE 1 TABLET BY MOUTH EVERY NIGHT, Disp: 90 tablet, Rfl: 1    estradiol (ESTRACE) 0.1 MG/GM vaginal cream, Apply fingertip amount to urethral meatus daily, Disp: , Rfl:     l-methylfolate-algae-B6-B12 (METANX)  "3-90.314-2-35 MG capsule capsule, Take 1 capsule by mouth 2 (Two) Times a Day., Disp: 180 capsule, Rfl: 3    levothyroxine (Synthroid) 50 MCG tablet, Take 1 tablet by mouth Daily., Disp: 30 tablet, Rfl: 2    lidocaine (XYLOCAINE) 5 % ointment, APPLY TO AFFECTED AREA BY TOPICAL ROUTE 1-4 TIMES DAILY AS NEEDED, Disp: , Rfl:     lisinopril-hydrochlorothiazide (PRINZIDE,ZESTORETIC) 20-12.5 MG per tablet, TAKE 1 TABLET BY MOUTH DAILY, Disp: 90 tablet, Rfl: 1    multivitamin with minerals tablet tablet, Take 1 tablet by mouth Daily., Disp: , Rfl:     psyllium (KONSYL) 28.3 % pack pack, Take 1 packet by mouth Daily., Disp: , Rfl:     ALLERGIES     Hydrocodone, Codeine, and Morphine    FAMILY HISTORY       Family History   Problem Relation Age of Onset    Hyperlipidemia Mother     Hypertension Mother     Diabetes Maternal Aunt     Obesity Maternal Aunt           SOCIAL HISTORY       Social History     Socioeconomic History    Marital status:      Spouse name: Vince    Number of children: 2    Years of education: 16    Highest education level: Some college, no degree   Tobacco Use    Smoking status: Never    Smokeless tobacco: Never   Vaping Use    Vaping status: Never Used   Substance and Sexual Activity    Alcohol use: Yes     Alcohol/week: 2.0 standard drinks of alcohol     Types: 2 Glasses of wine per week     Comment: nightly     Drug use: Never    Sexual activity: Yes     Partners: Male     Birth control/protection: None         PHYSICAL EXAM    (up to 7 for level 4, 8 or more for level 5)     Vitals:    05/24/24 1538 05/24/24 1630   BP: 176/80    BP Location: Left arm    Patient Position: Sitting    Pulse: 100 95   Resp: 18 18   Temp: 98 °F (36.7 °C)    TempSrc: Oral    SpO2: 98% 98%   Weight: 64.9 kg (143 lb)    Height: 170.2 cm (67\")        General: Awake, alert, no acute distress.  HEENT: Conjunctivae normal.  Neck: Trachea midline.  Cardiac: Heart regular rate, rhythm, no murmurs, rubs, or gallops  Lungs: " Lungs are clear to auscultation, there is no wheezing, rhonchi, or rales. There is no use of accessory muscles.  Chest wall: There is no tenderness to palpation over the chest wall or over ribs  Abdomen: Abdomen is soft, nontender, nondistended. There are no firm or pulsatile masses, no rebound rigidity or guarding.   Musculoskeletal: No deformity.  Neuro: Alert and oriented x 4.  Dermatology: Skin is warm and dry  Psych: Mentation is grossly normal, cognition is grossly normal. Affect is appropriate.        DIAGNOSTIC RESULTS     EKG: All EKGs are interpreted by the Emergency Department Physician who either signs or Co-signs this chart in the absence of a cardiologist.    ECG 12 Lead Other; ingestion   Preliminary Result   Test Reason : Other~   Blood Pressure :   */*   mmHG   Vent. Rate :  69 BPM     Atrial Rate :  69 BPM      P-R Int : 126 ms          QRS Dur :  86 ms       QT Int : 390 ms       P-R-T Axes :  51  70  40 degrees      QTc Int : 417 ms      Normal sinus rhythm   Normal ECG   No previous ECGs available      Referred By:            Confirmed By:             RADIOLOGY:   [x] Radiologist's Report Reviewed:  No orders to display       I ordered and independently reviewed the above noted radiographic studies.        LABS:    I have reviewed and interpreted all of the currently available lab results from this visit (if applicable):  Results for orders placed or performed during the hospital encounter of 05/24/24   COVID-19 and FLU A/B PCR, 1 HR TAT - Swab, Nasopharynx    Specimen: Nasopharynx; Swab   Result Value Ref Range    COVID19 Not Detected Not Detected - Ref. Range    Influenza A PCR Not Detected Not Detected    Influenza B PCR Not Detected Not Detected   Comprehensive Metabolic Panel    Specimen: Blood   Result Value Ref Range    Glucose 105 (H) 65 - 99 mg/dL    BUN 11 8 - 23 mg/dL    Creatinine 0.91 0.57 - 1.00 mg/dL    Sodium 134 (L) 136 - 145 mmol/L    Potassium 3.7 3.5 - 5.2 mmol/L    Chloride  93 (L) 98 - 107 mmol/L    CO2 28.0 22.0 - 29.0 mmol/L    Calcium 9.7 8.6 - 10.5 mg/dL    Total Protein 7.3 6.0 - 8.5 g/dL    Albumin 4.6 3.5 - 5.2 g/dL    ALT (SGPT) 14 1 - 33 U/L    AST (SGOT) 30 1 - 32 U/L    Alkaline Phosphatase 100 39 - 117 U/L    Total Bilirubin 0.6 0.0 - 1.2 mg/dL    Globulin 2.7 gm/dL    A/G Ratio 1.7 g/dL    BUN/Creatinine Ratio 12.1 7.0 - 25.0    Anion Gap 13.0 5.0 - 15.0 mmol/L    eGFR 64.7 >60.0 mL/min/1.73   CBC Auto Differential    Specimen: Blood   Result Value Ref Range    WBC 6.80 3.40 - 10.80 10*3/mm3    RBC 4.19 3.77 - 5.28 10*6/mm3    Hemoglobin 13.5 12.0 - 15.9 g/dL    Hematocrit 41.3 34.0 - 46.6 %    MCV 98.6 (H) 79.0 - 97.0 fL    MCH 32.2 26.6 - 33.0 pg    MCHC 32.7 31.5 - 35.7 g/dL    RDW 12.9 12.3 - 15.4 %    RDW-SD 46.7 37.0 - 54.0 fl    MPV 8.8 6.0 - 12.0 fL    Platelets 308 140 - 450 10*3/mm3    Neutrophil % 62.9 42.7 - 76.0 %    Lymphocyte % 27.4 19.6 - 45.3 %    Monocyte % 7.2 5.0 - 12.0 %    Eosinophil % 1.6 0.3 - 6.2 %    Basophil % 0.6 0.0 - 1.5 %    Immature Grans % 0.3 0.0 - 0.5 %    Neutrophils, Absolute 4.28 1.70 - 7.00 10*3/mm3    Lymphocytes, Absolute 1.86 0.70 - 3.10 10*3/mm3    Monocytes, Absolute 0.49 0.10 - 0.90 10*3/mm3    Eosinophils, Absolute 0.11 0.00 - 0.40 10*3/mm3    Basophils, Absolute 0.04 0.00 - 0.20 10*3/mm3    Immature Grans, Absolute 0.02 0.00 - 0.05 10*3/mm3    nRBC 0.0 0.0 - 0.2 /100 WBC   ECG 12 Lead Other; ingestion   Result Value Ref Range    QT Interval 390 ms    QTC Interval 417 ms        If labs were ordered, I independently reviewed the results and considered them in treating the patient.      EMERGENCY DEPARTMENT COURSE and DIFFERENTIAL DIAGNOSIS/MDM:   Vitals:  AS OF 23:53 EDT    BP - 176/80  HR - 95  TEMP - 98 °F (36.7 °C) (Oral)  O2 SATS - 98%        Discussion below represents my analysis of pertinent findings related to patient's condition, differential diagnosis, treatment plan and final disposition.      Differential  diagnosis:  The differential diagnosis associated with the patient's presentation includes: Toxicologic ingestion, electrolyte derangement      Independent interpretations (ECG/rhythm strip/X-ray/US/CT scan): I independently interpreted the patient's cardiac monitor which showed sinus rhythm      Care significantly affected by Social Determinants of Health (housing and economic circumstances, unemployment)    [] Yes     [x] No   If yes, Patient's care significantly limited by  Social Determinants of Health including:    [] Inadequate housing    [] Low income    [] Alcoholism and drug addiction in family    [] Problems related to primary support group    [] Unemployment    [] Problems related to employment    [] Other Social Determinants of Health:       ED Course:           Patient well-appearing throughout the duration of her stay in the emergency department with normal labs and normal vital signs.  Nurse spoke with poison control and there is nothing to do from their standpoint.  No specific intervention or observation time required.    I had a discussion with the patient/family regarding diagnosis, diagnostic results, treatment plan, and medications.  The patient/family indicated understanding of these instructions.  I spent adequate time at the bedside preceding discharge necessary to personally discuss the aftercare instructions, giving patient education, providing explanations of the results of our evaluations/findings, and my decision making to assure that the patient/family understand the plan of care.  Time was allotted to answer questions at that time and throughout the ED course.  Emphasis was placed on timely follow-up after discharge.  I also discussed the potential for the development of an acute emergent condition requiring further evaluation, admission, or even surgical intervention. I discussed that we found nothing during the visit today indicating the need for further workup, admission, or the  presence of an unstable medical condition.  I encouraged the patient to return to the emergency department immediately for ANY concerns, worsening, new complaints, or if symptoms persist and unable to seek follow-up in a timely fashion.  The patient/family expressed understanding and agreement with this plan.  The patient will follow-up with their PCP in 1-2 days for reevaluation.           PROCEDURES:  Procedures    CRITICAL CARE TIME        FINAL IMPRESSION      1. Accidental ingestion of substance, initial encounter    2. Acute nonintractable headache, unspecified headache type          DISPOSITION/PLAN     ED Disposition       ED Disposition   Discharge    Condition   Stable    Comment   --                 Comment: Please note this report has been produced using speech recognition software.      Tomasz Lopez MD  Attending Emergency Physician             Tomasz Lopez MD  05/24/24 3319

## 2024-05-24 NOTE — TELEPHONE ENCOUNTER
Angi at  transferred call from pt's daughter on the pt. Angi advised that the pt has an ant infestation, and had a drink of water early this morning. At the time of call the pt is having a burning sensation in her throat.     Spoke with on-call Dr. Garcia. We were needing to advise the pt to gargle warm saltwater and to drink lots of water, and to verify if she has any other symptoms. The pt's daughter disconnected the call. I called the 2 different numbers we have on file for the pt, however they went to voicemail ([phone]) & ([phone])

## 2024-05-25 LAB
QT INTERVAL: 390 MS
QTC INTERVAL: 417 MS

## 2024-05-30 NOTE — TELEPHONE ENCOUNTER
Ok I have ordered the MRI of the head Dr Nance wanted and follow up next week to make sure it is scheduled please

## 2024-06-19 ENCOUNTER — TELEPHONE (OUTPATIENT)
Dept: INTERNAL MEDICINE | Facility: CLINIC | Age: 79
End: 2024-06-19

## 2024-06-19 ENCOUNTER — HOSPITAL ENCOUNTER (OUTPATIENT)
Dept: MRI IMAGING | Facility: HOSPITAL | Age: 79
Discharge: HOME | End: 2024-06-19
Admitting: INTERNAL MEDICINE
Payer: MEDICARE

## 2024-06-19 DIAGNOSIS — R93.0 ABNORMAL CT OF THE HEAD: ICD-10-CM

## 2024-06-19 DIAGNOSIS — R41.3 MEMORY LOSS: ICD-10-CM

## 2024-06-19 PROCEDURE — 70551 MRI BRAIN STEM W/O DYE: CPT

## 2024-06-25 ENCOUNTER — TELEPHONE (OUTPATIENT)
Dept: INTERNAL MEDICINE | Facility: CLINIC | Age: 79
End: 2024-06-25
Payer: MEDICARE

## 2024-06-25 NOTE — TELEPHONE ENCOUNTER
"Caller: Sandy Brownlee \"Agueda\"    Relationship: Self    Best call back number: [phone]     What is the medical concern/diagnosis: BOWEL CONCERNS     What specialty or service is being requested: GASTRO     What is the provider, practice or medical service name: PER PCP    What is the office location:     What is the office phone number:     Any additional details: PATIENT STATES HER CURRENT GASTRO PROVIDER HAS MOVED TO [place].         "

## 2024-07-01 ENCOUNTER — OFFICE VISIT (OUTPATIENT)
Dept: INTERNAL MEDICINE | Facility: CLINIC | Age: 79
End: 2024-07-01
Payer: MEDICARE

## 2024-07-01 VITALS
BODY MASS INDEX: 21.5 KG/M2 | HEART RATE: 66 BPM | SYSTOLIC BLOOD PRESSURE: 112 MMHG | WEIGHT: 137 LBS | DIASTOLIC BLOOD PRESSURE: 56 MMHG | TEMPERATURE: 98 F | HEIGHT: 67 IN

## 2024-07-01 DIAGNOSIS — K59.00 CONSTIPATION, UNSPECIFIED CONSTIPATION TYPE: ICD-10-CM

## 2024-07-01 DIAGNOSIS — R19.7 DIARRHEA, UNSPECIFIED TYPE: Primary | ICD-10-CM

## 2024-07-01 PROCEDURE — 99213 OFFICE O/P EST LOW 20 MIN: CPT

## 2024-07-01 PROCEDURE — 1125F AMNT PAIN NOTED PAIN PRSNT: CPT

## 2024-07-01 PROCEDURE — 3074F SYST BP LT 130 MM HG: CPT

## 2024-07-01 PROCEDURE — 1159F MED LIST DOCD IN RCRD: CPT

## 2024-07-01 PROCEDURE — 1160F RVW MEDS BY RX/DR IN RCRD: CPT

## 2024-07-01 PROCEDURE — 3078F DIAST BP <80 MM HG: CPT

## 2024-07-01 PROCEDURE — G2211 COMPLEX E/M VISIT ADD ON: HCPCS

## 2024-07-01 NOTE — PROGRESS NOTES
Acute Office Visit      Name: Sandy Brownlee    : 1945     MRN: [id number]   Care Team: Patient Care Team:  Sylvester Roman MD as PCP - General (Internal Medicine)  Sirisha Partida MD as Consulting Physician (Dermatology)  Daniel Pascal MD as Consulting Physician (Ophthalmology)  Nayeli Bartlett MD as Consulting Physician (Endocrinology)    Chief Complaint  GI Problem    Subjective     History of Present Illness:    Agueda is a pleasant 78-year-old female who comes to the office today requesting a referral to gastroenterology.    She states that she has been experiencing constipation within the last 6 months so she began to drink prune juice and increase roughage.  She has been doing this for the past several months.  She states that it has resolved her constipation however, she is now experiencing significant diarrhea.    She denies abdominal pain or discomfort.  Denies changes in appetite.  Denies use of any type of bowel medication.  Denies blood in the stool.  Denies any reflux or heartburn type symptoms.  She most recently had a colonoscopy completed by Dr. Hatch approximately 1.5 years ago, reports no concerns and colonoscopy.    She states that she drinks approximately 24 ounces of water daily.  She denies use of current order for Metamucil.    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 2022    Skin lesion of right leg 2017    precancerous    Vertigo 2004     2nd HTN       Past Surgical History:   Procedure Laterality Date    HYSTERECTOMY  1986    SKIN CANCER EXCISION      Right arm Dr Partida       Social History     Socioeconomic History    Marital status:      Spouse name: Vince    Number of children: 2     "Years of education: 16    Highest education level: Some college, no degree   Tobacco Use    Smoking status: Never    Smokeless tobacco: Never   Vaping Use    Vaping status: Never Used   Substance and Sexual Activity    Alcohol use: Yes     Alcohol/week: 2.0 standard drinks of alcohol     Types: 2 Glasses of wine per week     Comment: nightly     Drug use: Never    Sexual activity: Yes     Partners: Male     Birth control/protection: None         Current Outpatient Medications:     atorvastatin (LIPITOR) 10 MG tablet, TAKE 1 TABLET BY MOUTH EVERY NIGHT AT BEDTIME, Disp: 90 tablet, Rfl: 1    cholecalciferol (VITAMIN D3) 250 MCG (46091 UT) capsule, Take 1 capsule by mouth Daily., Disp: , Rfl:     donepezil (ARICEPT) 5 MG tablet, TAKE 1 TABLET BY MOUTH EVERY NIGHT, Disp: 90 tablet, Rfl: 1    estradiol (ESTRACE) 0.1 MG/GM vaginal cream, Apply fingertip amount to urethral meatus daily, Disp: , Rfl:     l-methylfolate-algae-B6-B12 (METANX) 3-90.314-2-35 MG capsule capsule, Take 1 capsule by mouth 2 (Two) Times a Day., Disp: 180 capsule, Rfl: 3    levothyroxine (Synthroid) 50 MCG tablet, Take 1 tablet by mouth Daily., Disp: 30 tablet, Rfl: 2    lidocaine (XYLOCAINE) 5 % ointment, APPLY TO AFFECTED AREA BY TOPICAL ROUTE 1-4 TIMES DAILY AS NEEDED, Disp: , Rfl:     lisinopril-hydrochlorothiazide (PRINZIDE,ZESTORETIC) 20-12.5 MG per tablet, TAKE 1 TABLET BY MOUTH DAILY, Disp: 90 tablet, Rfl: 1    multivitamin with minerals tablet tablet, Take 1 tablet by mouth Daily., Disp: , Rfl:     psyllium (KONSYL) 28.3 % pack pack, Take 1 packet by mouth Daily., Disp: , Rfl:     Procedures    PHQ-9 Total Score:      Objective     Vital Signs  /56   Pulse 66   Temp 98 °F (36.7 °C)   Ht 170.2 cm (67.01\")   Wt 62.1 kg (137 lb)   BMI 21.45 kg/m²   Estimated body mass index is 21.45 kg/m² as calculated from the following:    Height as of this encounter: 170.2 cm (67.01\").    Weight as of this encounter: 62.1 kg (137 lb).    BMI is " within normal parameters. No other follow-up for BMI required.      Physical Exam  Vitals and nursing note reviewed.   HENT:      Head: Normocephalic.   Cardiovascular:      Rate and Rhythm: Normal rate.   Pulmonary:      Effort: Pulmonary effort is normal.      Breath sounds: Normal breath sounds.   Abdominal:      General: Abdomen is flat. Bowel sounds are normal.      Palpations: Abdomen is soft.      Tenderness: There is no abdominal tenderness.   Skin:     General: Skin is warm and dry.   Neurological:      General: No focal deficit present.      Mental Status: She is alert and oriented to person, place, and time.   Psychiatric:         Mood and Affect: Mood normal.         Behavior: Behavior normal.         Thought Content: Thought content normal.         Judgment: Judgment normal.          Assessment and Plan     Assessment/Plan:  Diagnoses and all orders for this visit:    1. Diarrhea, unspecified type (Primary)  -     Ambulatory Referral to Gastroenterology  -Encouraged to discontinue use of prune juice.  -Encouraged to increase water intake to at least 64 ounces daily.  -Encouraged to restart psyllium daily.  -Referral to gastroenterology per request.    2. Constipation, unspecified constipation type  -     Ambulatory Referral to Gastroenterology  -Same as above.    There are no Patient Instructions on file for this visit.  Plan of care reviewed with patient at the conclusion of today's visit. Education was provided regarding diagnosis, management and any prescribed or recommended OTC medications.  Patient verbalizes understanding of and agreement with management plan.    Follow Up  Return for Next Scheduled Follow up. 11/2024 for continued management of chronic conditions.    JOEL Aguilar

## 2024-07-08 ENCOUNTER — APPOINTMENT (OUTPATIENT)
Dept: CT IMAGING | Facility: HOSPITAL | Age: 79
End: 2024-07-08
Payer: MEDICARE

## 2024-07-08 ENCOUNTER — HOSPITAL ENCOUNTER (EMERGENCY)
Facility: HOSPITAL | Age: 79
Discharge: HOME | End: 2024-07-08
Attending: EMERGENCY MEDICINE
Payer: MEDICARE

## 2024-07-08 VITALS
HEIGHT: 67 IN | HEART RATE: 72 BPM | TEMPERATURE: 97.5 F | DIASTOLIC BLOOD PRESSURE: 97 MMHG | WEIGHT: 136 LBS | SYSTOLIC BLOOD PRESSURE: 148 MMHG | RESPIRATION RATE: 17 BRPM | OXYGEN SATURATION: 98 % | BODY MASS INDEX: 21.35 KG/M2

## 2024-07-08 DIAGNOSIS — K59.00 CONSTIPATION, UNSPECIFIED CONSTIPATION TYPE: Primary | ICD-10-CM

## 2024-07-08 DIAGNOSIS — R74.8 ELEVATED LIPASE: ICD-10-CM

## 2024-07-08 LAB
ALBUMIN SERPL-MCNC: 4 G/DL (ref 3.5–5.2)
ALBUMIN/GLOB SERPL: 1.6 G/DL
ALP SERPL-CCNC: 105 U/L (ref 39–117)
ALT SERPL W P-5'-P-CCNC: 12 U/L (ref 1–33)
ANION GAP SERPL CALCULATED.3IONS-SCNC: 9 MMOL/L (ref 5–15)
AST SERPL-CCNC: 19 U/L (ref 1–32)
BACTERIA UR QL AUTO: ABNORMAL /HPF
BASOPHILS # BLD AUTO: 0.04 10*3/MM3 (ref 0–0.2)
BASOPHILS NFR BLD AUTO: 0.7 % (ref 0–1.5)
BILIRUB SERPL-MCNC: 0.3 MG/DL (ref 0–1.2)
BILIRUB UR QL STRIP: NEGATIVE
BUN SERPL-MCNC: 11 MG/DL (ref 8–23)
BUN/CREAT SERPL: 12.8 (ref 7–25)
CALCIUM SPEC-SCNC: 9.8 MG/DL (ref 8.6–10.5)
CHLORIDE SERPL-SCNC: 98 MMOL/L (ref 98–107)
CLARITY UR: ABNORMAL
CO2 SERPL-SCNC: 29 MMOL/L (ref 22–29)
COLOR UR: YELLOW
CREAT SERPL-MCNC: 0.86 MG/DL (ref 0.57–1)
DEPRECATED RDW RBC AUTO: 46.6 FL (ref 37–54)
EGFRCR SERPLBLD CKD-EPI 2021: 69.2 ML/MIN/1.73
EOSINOPHIL # BLD AUTO: 0.49 10*3/MM3 (ref 0–0.4)
EOSINOPHIL NFR BLD AUTO: 8.6 % (ref 0.3–6.2)
ERYTHROCYTE [DISTWIDTH] IN BLOOD BY AUTOMATED COUNT: 12.6 % (ref 12.3–15.4)
GLOBULIN UR ELPH-MCNC: 2.5 GM/DL
GLUCOSE SERPL-MCNC: 107 MG/DL (ref 65–99)
GLUCOSE UR STRIP-MCNC: NEGATIVE MG/DL
HCT VFR BLD AUTO: 36.6 % (ref 34–46.6)
HGB BLD-MCNC: 12.1 G/DL (ref 12–15.9)
HGB UR QL STRIP.AUTO: NEGATIVE
HYALINE CASTS UR QL AUTO: ABNORMAL /LPF
IMM GRANULOCYTES # BLD AUTO: 0.01 10*3/MM3 (ref 0–0.05)
IMM GRANULOCYTES NFR BLD AUTO: 0.2 % (ref 0–0.5)
KETONES UR QL STRIP: NEGATIVE
LEUKOCYTE ESTERASE UR QL STRIP.AUTO: ABNORMAL
LIPASE SERPL-CCNC: 479 U/L (ref 13–60)
LYMPHOCYTES # BLD AUTO: 2.03 10*3/MM3 (ref 0.7–3.1)
LYMPHOCYTES NFR BLD AUTO: 35.5 % (ref 19.6–45.3)
MCH RBC QN AUTO: 33.1 PG (ref 26.6–33)
MCHC RBC AUTO-ENTMCNC: 33.1 G/DL (ref 31.5–35.7)
MCV RBC AUTO: 100 FL (ref 79–97)
MONOCYTES # BLD AUTO: 0.69 10*3/MM3 (ref 0.1–0.9)
MONOCYTES NFR BLD AUTO: 12.1 % (ref 5–12)
NEUTROPHILS NFR BLD AUTO: 2.46 10*3/MM3 (ref 1.7–7)
NEUTROPHILS NFR BLD AUTO: 42.9 % (ref 42.7–76)
NITRITE UR QL STRIP: NEGATIVE
NRBC BLD AUTO-RTO: 0 /100 WBC (ref 0–0.2)
PH UR STRIP.AUTO: 6.5 [PH] (ref 5–8)
PLATELET # BLD AUTO: 306 10*3/MM3 (ref 140–450)
PMV BLD AUTO: 8.9 FL (ref 6–12)
POTASSIUM SERPL-SCNC: 4.1 MMOL/L (ref 3.5–5.2)
PROT SERPL-MCNC: 6.5 G/DL (ref 6–8.5)
PROT UR QL STRIP: NEGATIVE
RBC # BLD AUTO: 3.66 10*6/MM3 (ref 3.77–5.28)
RBC # UR STRIP: ABNORMAL /HPF
REF LAB TEST METHOD: ABNORMAL
SODIUM SERPL-SCNC: 136 MMOL/L (ref 136–145)
SP GR UR STRIP: 1.03 (ref 1–1.03)
SQUAMOUS #/AREA URNS HPF: ABNORMAL /HPF
UROBILINOGEN UR QL STRIP: ABNORMAL
WBC # UR STRIP: ABNORMAL /HPF
WBC NRBC COR # BLD AUTO: 5.72 10*3/MM3 (ref 3.4–10.8)

## 2024-07-08 PROCEDURE — 80053 COMPREHEN METABOLIC PANEL: CPT | Performed by: EMERGENCY MEDICINE

## 2024-07-08 PROCEDURE — 83690 ASSAY OF LIPASE: CPT | Performed by: EMERGENCY MEDICINE

## 2024-07-08 PROCEDURE — 81001 URINALYSIS AUTO W/SCOPE: CPT | Performed by: EMERGENCY MEDICINE

## 2024-07-08 PROCEDURE — 74177 CT ABD & PELVIS W/CONTRAST: CPT

## 2024-07-08 PROCEDURE — 85025 COMPLETE CBC W/AUTO DIFF WBC: CPT | Performed by: EMERGENCY MEDICINE

## 2024-07-08 PROCEDURE — 25510000001 IOPAMIDOL 61 % SOLUTION: Performed by: EMERGENCY MEDICINE

## 2024-07-08 PROCEDURE — 25810000003 LACTATED RINGERS SOLUTION: Performed by: EMERGENCY MEDICINE

## 2024-07-08 PROCEDURE — 99285 EMERGENCY DEPT VISIT HI MDM: CPT

## 2024-07-08 RX ORDER — LEVOTHYROXINE SODIUM 0.03 MG/1
25 TABLET ORAL
Qty: 90 TABLET | Refills: 1 | OUTPATIENT
Start: 2024-07-08

## 2024-07-08 RX ADMIN — IOPAMIDOL 100 ML: 612 INJECTION, SOLUTION INTRAVENOUS at 03:15

## 2024-07-08 RX ADMIN — SODIUM CHLORIDE, POTASSIUM CHLORIDE, SODIUM LACTATE AND CALCIUM CHLORIDE 1000 ML: 600; 310; 30; 20 INJECTION, SOLUTION INTRAVENOUS at 04:01

## 2024-07-08 NOTE — ED PROVIDER NOTES
Subjective   History of Present Illness  Patient presents for evaluation with a primary complaint of constipation.  Patient states that for the past 10 days she has only had very small drops of liquid stool with no formed stool.  She has taken magnesium citrate without any relief.  She feels tightness across her bilateral abdomen.  No nausea or vomiting.  She has had prior abdominal surgery.  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 11/27/2022    Skin lesion of right leg 11/2017    precancerous    Vertigo 2004     2nd HTN       Allergies   Allergen Reactions    Hydrocodone GI Intolerance    Codeine Nausea Only    Morphine Nausea Only       Past Surgical History:   Procedure Laterality Date    HYSTERECTOMY  1986    SKIN CANCER EXCISION      Right arm Dr Partida       Family History   Problem Relation Age of Onset    Hyperlipidemia Mother     Hypertension Mother     Diabetes Maternal Aunt     Obesity Maternal Aunt        Social History     Socioeconomic History    Marital status:      Spouse name: Vince    Number of children: 2    Years of education: 16    Highest education level: Some college, no degree   Tobacco Use    Smoking status: Never    Smokeless tobacco: Never   Vaping Use    Vaping status: Never Used   Substance and Sexual Activity    Alcohol use: Yes     Alcohol/week: 2.0 standard drinks of alcohol     Types: 2 Glasses of wine per week     Comment: nightly     Drug use: Never    Sexual activity: Yes     Partners: Male     Birth control/protection: None           Objective   Physical Exam  Constitutional:       General: She is not in acute distress.  HENT:      Head: Normocephalic and  atraumatic.   Eyes:      Conjunctiva/sclera: Conjunctivae normal.      Pupils: Pupils are equal, round, and reactive to light.   Cardiovascular:      Rate and Rhythm: Normal rate and regular rhythm.      Pulses: Normal pulses.      Heart sounds: No murmur heard.     No gallop.   Pulmonary:      Effort: Pulmonary effort is normal. No respiratory distress.   Abdominal:      General: Abdomen is flat. There is no distension.      Tenderness: There is no abdominal tenderness. There is no guarding or rebound.   Musculoskeletal:         General: No swelling or deformity. Normal range of motion.   Skin:     General: Skin is warm and dry.      Capillary Refill: Capillary refill takes less than 2 seconds.   Neurological:      General: No focal deficit present.      Mental Status: She is alert and oriented to person, place, and time.   Psychiatric:         Mood and Affect: Mood normal.         Behavior: Behavior normal.         Procedures           ED Course  ED Course as of 07/08/24 0515   Mon Jul 08, 2024   0513 Laboratory workup independently interpreted by myself is notable for an elevated lipase, mild pyuria [KB]   0515 CT scan of the abdomen and pelvis independently interpreted by myself demonstrate no acute abnormalities, small nonobstructing renal stone, bladder wall thickening the patient does not have symptoms of cystitis [KB]      ED Course User Index  [KB] Gigi Xiong MD                                             Medical Decision Making  Differential diagnosis includes constipation, fecal impaction, bowel obstruction, colitis or diverticulitis.  Lab and imaging studies were conducted.    Patient was reassessed.  We discussed the abnormalities on her laboratory data, specifically elevated lipase.  Patient has a benign abdominal examination with no epigastric tenderness at this time.  She has no nausea or vomiting.  She did drink 2 glasses of alcohol a day and she was counseled to stop.  I do not see any reason  for hospitalization given her lack of symptoms.  She does not have symptoms of urinary tract infection either.    She was counseled on need for follow-up with primary care doctor, treatment for constipation.  Discharged from the ER in good condition    Problems Addressed:  Constipation, unspecified constipation type: complicated acute illness or injury  Elevated lipase: complicated acute illness or injury    Amount and/or Complexity of Data Reviewed  Independent Historian:      Details: Patient's  at the bedside provides some details of HPI  External Data Reviewed: notes.     Details: 5/24/2024 reviewed most recent ER visit where patient was evaluated for accidental ingestion of household poison  Labs: ordered. Decision-making details documented in ED Course.  Radiology: ordered and independent interpretation performed. Decision-making details documented in ED Course.    Risk  OTC drugs.  Prescription drug management.  Decision regarding hospitalization.        Final diagnoses:   Constipation, unspecified constipation type   Elevated lipase       ED Disposition  ED Disposition       ED Disposition   Discharge    Condition   Stable    Comment   --             Recent Results (from the past 24 hour(s))   Comprehensive Metabolic Panel    Collection Time: 07/08/24  2:57 AM    Specimen: Blood   Result Value Ref Range    Glucose 107 (H) 65 - 99 mg/dL    BUN 11 8 - 23 mg/dL    Creatinine 0.86 0.57 - 1.00 mg/dL    Sodium 136 136 - 145 mmol/L    Potassium 4.1 3.5 - 5.2 mmol/L    Chloride 98 98 - 107 mmol/L    CO2 29.0 22.0 - 29.0 mmol/L    Calcium 9.8 8.6 - 10.5 mg/dL    Total Protein 6.5 6.0 - 8.5 g/dL    Albumin 4.0 3.5 - 5.2 g/dL    ALT (SGPT) 12 1 - 33 U/L    AST (SGOT) 19 1 - 32 U/L    Alkaline Phosphatase 105 39 - 117 U/L    Total Bilirubin 0.3 0.0 - 1.2 mg/dL    Globulin 2.5 gm/dL    A/G Ratio 1.6 g/dL    BUN/Creatinine Ratio 12.8 7.0 - 25.0    Anion Gap 9.0 5.0 - 15.0 mmol/L    eGFR 69.2 >60.0 mL/min/1.73    Lipase    Collection Time: 07/08/24  2:57 AM    Specimen: Blood   Result Value Ref Range    Lipase 479 (H) 13 - 60 U/L   CBC Auto Differential    Collection Time: 07/08/24  2:57 AM    Specimen: Blood   Result Value Ref Range    WBC 5.72 3.40 - 10.80 10*3/mm3    RBC 3.66 (L) 3.77 - 5.28 10*6/mm3    Hemoglobin 12.1 12.0 - 15.9 g/dL    Hematocrit 36.6 34.0 - 46.6 %    .0 (H) 79.0 - 97.0 fL    MCH 33.1 (H) 26.6 - 33.0 pg    MCHC 33.1 31.5 - 35.7 g/dL    RDW 12.6 12.3 - 15.4 %    RDW-SD 46.6 37.0 - 54.0 fl    MPV 8.9 6.0 - 12.0 fL    Platelets 306 140 - 450 10*3/mm3    Neutrophil % 42.9 42.7 - 76.0 %    Lymphocyte % 35.5 19.6 - 45.3 %    Monocyte % 12.1 (H) 5.0 - 12.0 %    Eosinophil % 8.6 (H) 0.3 - 6.2 %    Basophil % 0.7 0.0 - 1.5 %    Immature Grans % 0.2 0.0 - 0.5 %    Neutrophils, Absolute 2.46 1.70 - 7.00 10*3/mm3    Lymphocytes, Absolute 2.03 0.70 - 3.10 10*3/mm3    Monocytes, Absolute 0.69 0.10 - 0.90 10*3/mm3    Eosinophils, Absolute 0.49 (H) 0.00 - 0.40 10*3/mm3    Basophils, Absolute 0.04 0.00 - 0.20 10*3/mm3    Immature Grans, Absolute 0.01 0.00 - 0.05 10*3/mm3    nRBC 0.0 0.0 - 0.2 /100 WBC   Urinalysis With Microscopic If Indicated (No Culture) - Urine, Clean Catch    Collection Time: 07/08/24  3:56 AM    Specimen: Urine, Clean Catch   Result Value Ref Range    Color, UA Yellow Yellow, Straw    Appearance, UA Cloudy (A) Clear    pH, UA 6.5 5.0 - 8.0    Specific Gravity, UA 1.029 1.001 - 1.030    Glucose, UA Negative Negative    Ketones, UA Negative Negative    Bilirubin, UA Negative Negative    Blood, UA Negative Negative    Protein, UA Negative Negative    Leuk Esterase, UA Moderate (2+) (A) Negative    Nitrite, UA Negative Negative    Urobilinogen, UA 0.2 E.U./dL 0.2 - 1.0 E.U./dL   Urinalysis, Microscopic Only - Urine, Clean Catch    Collection Time: 07/08/24  3:56 AM    Specimen: Urine, Clean Catch   Result Value Ref Range    RBC, UA 0-2 None Seen, 0-2 /HPF    WBC, UA 6-10 (A) None Seen, 0-2  /HPF    Bacteria, UA None Seen None Seen, Trace /HPF    Squamous Epithelial Cells, UA 3-6 (A) None Seen, 0-2 /HPF    Hyaline Casts, UA 0-6 0 - 6 /LPF    Methodology Manual Light Microscopy      Note: In addition to lab results from this visit, the labs listed above may include labs taken at another facility or during a different encounter within the last 24 hours. Please correlate lab times with ED admission and discharge times for further clarification of the services performed during this visit.    CT Abdomen Pelvis With Contrast   Final Result   Impression:   1.No acute abdominal or pelvic abnormality.   2.Stable urinary bladder wall thickening and adjacent fat stranding. Correlate for cystitis.   3.3 mm nonobstructing right kidney stone.   4.Mild colonic diverticulosis.            Electronically Signed: Vishal Carver MD     7/8/2024 3:48 AM EDT     Workstation ID: [id number]        Vitals:    07/08/24 0407 07/08/24 0412 07/08/24 0417 07/08/24 0422   BP:       BP Location:       Patient Position:       Pulse: 68 69 72 72   Resp:       Temp:       TempSrc:       SpO2: 98% 97% 97% 98%   Weight:       Height:         Medications   iopamidol (ISOVUE-300) 61 % injection 100 mL (100 mL Intravenous Given 7/8/24 0315)   lactated ringers bolus 1,000 mL (0 mL Intravenous Stopped 7/8/24 0510)     ECG/EMG Results (last 24 hours)       ** No results found for the last 24 hours. **          No orders to display           Sylvester Roman MD  [address] [address]  [phone]               Medication List      No changes were made to your prescriptions during this visit.            Gigi Xiong MD  07/08/24 5635

## 2024-07-08 NOTE — DISCHARGE INSTRUCTIONS
Recommend taking stool softener MiraLAX 1 capful (17 g) dissolved in 16 ounces of water 2 times daily to soften the stool.  Take bowel stimulant senna 1 tablet 1-2 times daily to stimulate bowel movement.  Follow-up with your primary care doctor for further management.  Return to the ER as needed for new or worsening symptoms.  He was seen today that your pancreatic enzymes were very elevated in the emergency room but you do not have symptoms of pancreatitis.  I recommend that you stop drinking alcohol and have a close follow-up appointment with your primary care doctor where they can recheck your pancreas enzymes.  Return to the ER as needed for new or worsening symptoms

## 2024-07-16 ENCOUNTER — OFFICE VISIT (OUTPATIENT)
Dept: GASTROENTEROLOGY | Facility: CLINIC | Age: 79
End: 2024-07-16
Payer: MEDICARE

## 2024-07-16 VITALS
DIASTOLIC BLOOD PRESSURE: 65 MMHG | TEMPERATURE: 97.5 F | HEART RATE: 77 BPM | HEIGHT: 67 IN | SYSTOLIC BLOOD PRESSURE: 123 MMHG | BODY MASS INDEX: 21.5 KG/M2 | WEIGHT: 137 LBS

## 2024-07-16 DIAGNOSIS — K59.04 CHRONIC IDIOPATHIC CONSTIPATION: ICD-10-CM

## 2024-07-16 DIAGNOSIS — R74.8 ELEVATED PANCREATIC ENZYME: Primary | ICD-10-CM

## 2024-07-16 PROCEDURE — 99213 OFFICE O/P EST LOW 20 MIN: CPT

## 2024-07-16 PROCEDURE — 3078F DIAST BP <80 MM HG: CPT

## 2024-07-16 PROCEDURE — 3074F SYST BP LT 130 MM HG: CPT

## 2024-07-16 RX ORDER — LISINOPRIL 20 MG/1
20 TABLET ORAL DAILY
COMMUNITY

## 2024-07-16 NOTE — PATIENT INSTRUCTIONS
Recommend bowel cleanse with Miralax, which is over the counter. Dissolve 5 capfuls into 32 ounces of sugar-free gatorade/powerade and drink 8 ounces every 30 minutes until gone.   Eat 25-30 grams of fiber daily. You can use fibercon tablets to supplement. This has a lower side-effect profile than some other fibers.   Drink 64-80 ounces of water daily. Ideally, half of that will have some electrolytes.

## 2024-07-16 NOTE — PROGRESS NOTES
Office Note     Name: Sandy Brownlee    : 1945     MRN: [id number]     Chief Complaint  Constipation    Subjective     History of Present Illness:  Sandy Brownlee is a 78 y.o. female who presents today for further evaluation of constipation.  She recently presented to the ED  for constipation, after having no real formed bowel movement for 10 days.  She took magnesium citrate without relief.  She reported tightness across her bilateral abdomen, without nausea or vomiting, chills, or fever.  She has history of prior abdominal surgeries, including appendectomy and hysterectomy.  Labs were significant for elevated lipase and mild pyuria.  CT of the abdomen pelvis without acute abnormalities, aside from a small nonobstructive renal stone.    Has long history of constipation.  She has tried magnesium citrate, daily MiraLAX, and daily Metamucil, without relief of her constipation.  She reports increasing fibrous foods has helped in the past, but has not lately.  She has been getting colonoscopies regularly with Dr. Hatch.  Last colonoscopy was 2-3 years ago, and reportedly within normal limits.      She denies any dysphagia, odynophagia, nausea, vomiting, early satiety, unintended weight loss, or melena.    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 2022    Skin lesion of right leg 2017    precancerous    Vertigo 2004     2nd HTN       Past Surgical History:   Past Surgical History:   Procedure Laterality Date    APPENDECTOMY      COLONOSCOPY      HYSTERECTOMY  1986    SKIN CANCER EXCISION      Right arm Dr Partida       Immunizations:   Immunization History   Administered Date(s) Administered    ABRYSVO (RSV, 60+ or pregnant women 32-36 wks) 10/06/2023     COVID-19 (MODERNA) 1st,2nd,3rd Dose Monovalent 01/14/2021, 02/11/2021, 08/15/2021    COVID-19 (UNSPECIFIED) 10/01/2023    FLUAD TRI 65YR+ 09/15/2020, 10/06/2023    Fluzone (or Fluarix & Flulaval for VFC) >6mos 09/15/2021    Hepatitis A 11/02/2018    Pneumococcal Conjugate 13-Valent (PCV13) 06/18/2015    Pneumococcal Polysaccharide (PPSV23) 11/01/2009, 11/01/2019    Shingrix 08/20/2019    Tdap 03/22/2011    Zostavax 01/28/2014    flucelvax quad pfs =>4 YRS 09/24/2019        Medications:     Current Outpatient Medications:     atorvastatin (LIPITOR) 10 MG tablet, TAKE 1 TABLET BY MOUTH EVERY NIGHT AT BEDTIME, Disp: 90 tablet, Rfl: 1    cholecalciferol (VITAMIN D3) 250 MCG (53438 UT) capsule, Take 1 capsule by mouth Daily., Disp: , Rfl:     donepezil (ARICEPT) 5 MG tablet, TAKE 1 TABLET BY MOUTH EVERY NIGHT, Disp: 90 tablet, Rfl: 1    l-methylfolate-algae-B6-B12 (METANX) 3-90.314-2-35 MG capsule capsule, Take 1 capsule by mouth 2 (Two) Times a Day., Disp: 180 capsule, Rfl: 3    levothyroxine (Synthroid) 50 MCG tablet, Take 1 tablet by mouth Daily., Disp: 30 tablet, Rfl: 2    lisinopril (PRINIVIL,ZESTRIL) 20 MG tablet, Take 1 tablet by mouth Daily., Disp: , Rfl:     lisinopril-hydrochlorothiazide (PRINZIDE,ZESTORETIC) 20-12.5 MG per tablet, TAKE 1 TABLET BY MOUTH DAILY, Disp: 90 tablet, Rfl: 1    multivitamin with minerals tablet tablet, Take 1 tablet by mouth Daily., Disp: , Rfl:     psyllium (KONSYL) 28.3 % pack pack, Take 1 packet by mouth Daily., Disp: , Rfl:     estradiol (ESTRACE) 0.1 MG/GM vaginal cream, Apply fingertip amount to urethral meatus daily (Patient not taking: Reported on 7/16/2024), Disp: , Rfl:     lidocaine (XYLOCAINE) 5 % ointment, APPLY TO AFFECTED AREA BY TOPICAL ROUTE 1-4 TIMES DAILY AS NEEDED (Patient not taking: Reported on 7/16/2024), Disp: , Rfl:     Allergies:   Allergies   Allergen Reactions    Hydrocodone GI Intolerance    Codeine Nausea Only    Morphine Nausea Only       Family History:  "  Family History   Problem Relation Age of Onset    Hyperlipidemia Mother     Hypertension Mother     Diabetes Maternal Aunt     Obesity Maternal Aunt     Colon cancer Neg Hx        Social History:   Social History     Socioeconomic History    Marital status:      Spouse name: Vince    Number of children: 2    Years of education: 16    Highest education level: Some college, no degree   Tobacco Use    Smoking status: Never    Smokeless tobacco: Never   Vaping Use    Vaping status: Never Used   Substance and Sexual Activity    Alcohol use: Yes     Alcohol/week: 2.0 standard drinks of alcohol     Types: 2 Glasses of wine per week     Comment: nightly     Drug use: Never    Sexual activity: Yes     Partners: Male     Birth control/protection: None         Objective     Vital Signs  /65 (BP Location: Left arm, Patient Position: Sitting, Cuff Size: Adult)   Pulse 77   Temp 97.5 °F (36.4 °C) (Temporal)   Ht 170.2 cm (67\")   Wt 62.1 kg (137 lb)   BMI 21.46 kg/m²   Estimated body mass index is 21.46 kg/m² as calculated from the following:    Height as of this encounter: 170.2 cm (67\").    Weight as of this encounter: 62.1 kg (137 lb).    BMI is within normal parameters. No other follow-up for BMI required.      Physical Exam  Vitals reviewed.   Constitutional:       General: She is awake.   Cardiovascular:      Rate and Rhythm: Normal rate.   Pulmonary:      Effort: Pulmonary effort is normal.   Abdominal:      Palpations: Abdomen is soft.      Tenderness: There is no abdominal tenderness.   Skin:     General: Skin is warm and dry.   Neurological:      Mental Status: She is alert.          Assessment and Plan     Assessment & Plan  Elevated pancreatic enzyme  Recheck lipase in a couple of weeks.  Patient denied any abdominal pain at the time of the lab, but has been consuming a couple of glasses of wine every evening.  This may have played a role, however her lipase was elevated beyond what I would expect " was just alcohol consumption  She has cut alcohol out of her diet entirely since her evaluation    Chronic idiopathic constipation  Patient has history of chronic constipation, not relieved with OTC remedies or increasing dietary fiber  Recommend bowel cleanse with MiraLAX, followed by daily Linzess 290 mcg.  Samples provided in office and she will let me know how this works for her.  Advised that she needs to drink a minimum of 64-80 ounces of water daily, ideally with half of that containing electrolytes, to avoid dehydration.      Orders Placed This Encounter   Procedures    Lipase             Follow Up  3 months    JOEL Waldron  [facility] [address]  [facility]  [address] [id number]

## 2024-07-30 ENCOUNTER — APPOINTMENT (OUTPATIENT)
Dept: CT IMAGING | Facility: HOSPITAL | Age: 79
End: 2024-07-30
Payer: MEDICARE

## 2024-07-30 ENCOUNTER — HOSPITAL ENCOUNTER (EMERGENCY)
Facility: HOSPITAL | Age: 79
Discharge: HOME | End: 2024-07-31
Attending: STUDENT IN AN ORGANIZED HEALTH CARE EDUCATION/TRAINING PROGRAM
Payer: MEDICARE

## 2024-07-30 VITALS
DIASTOLIC BLOOD PRESSURE: 57 MMHG | WEIGHT: 138 LBS | TEMPERATURE: 96.5 F | HEIGHT: 67 IN | BODY MASS INDEX: 21.66 KG/M2 | RESPIRATION RATE: 16 BRPM | HEART RATE: 75 BPM | SYSTOLIC BLOOD PRESSURE: 128 MMHG | OXYGEN SATURATION: 96 %

## 2024-07-30 DIAGNOSIS — N39.0 ACUTE UTI (URINARY TRACT INFECTION): ICD-10-CM

## 2024-07-30 DIAGNOSIS — R11.2 NAUSEA AND VOMITING, UNSPECIFIED VOMITING TYPE: ICD-10-CM

## 2024-07-30 DIAGNOSIS — R51.9 ACUTE NONINTRACTABLE HEADACHE, UNSPECIFIED HEADACHE TYPE: Primary | ICD-10-CM

## 2024-07-30 LAB
ALBUMIN SERPL-MCNC: 4.4 G/DL (ref 3.5–5.2)
ALBUMIN/GLOB SERPL: 1.6 G/DL
ALP SERPL-CCNC: 97 U/L (ref 39–117)
ALT SERPL W P-5'-P-CCNC: 15 U/L (ref 1–33)
ANION GAP SERPL CALCULATED.3IONS-SCNC: 15 MMOL/L (ref 5–15)
AST SERPL-CCNC: 24 U/L (ref 1–32)
BASOPHILS # BLD AUTO: 0.06 10*3/MM3 (ref 0–0.2)
BASOPHILS NFR BLD AUTO: 0.6 % (ref 0–1.5)
BILIRUB SERPL-MCNC: 0.2 MG/DL (ref 0–1.2)
BUN SERPL-MCNC: 15 MG/DL (ref 8–23)
BUN/CREAT SERPL: 15.2 (ref 7–25)
CALCIUM SPEC-SCNC: 10 MG/DL (ref 8.6–10.5)
CHLORIDE SERPL-SCNC: 100 MMOL/L (ref 98–107)
CO2 SERPL-SCNC: 24 MMOL/L (ref 22–29)
CREAT SERPL-MCNC: 0.99 MG/DL (ref 0.57–1)
CRP SERPL-MCNC: <0.3 MG/DL (ref 0–0.5)
D-LACTATE SERPL-SCNC: 1.3 MMOL/L (ref 0.5–2)
DEPRECATED RDW RBC AUTO: 44.2 FL (ref 37–54)
EGFRCR SERPLBLD CKD-EPI 2021: 58.5 ML/MIN/1.73
EOSINOPHIL # BLD AUTO: 0.3 10*3/MM3 (ref 0–0.4)
EOSINOPHIL NFR BLD AUTO: 3 % (ref 0.3–6.2)
ERYTHROCYTE [DISTWIDTH] IN BLOOD BY AUTOMATED COUNT: 12.2 % (ref 12.3–15.4)
FLUAV RNA RESP QL NAA+PROBE: NOT DETECTED
FLUBV RNA RESP QL NAA+PROBE: NOT DETECTED
GEN 5 2HR TROPONIN T REFLEX: 12 NG/L
GLOBULIN UR ELPH-MCNC: 2.7 GM/DL
GLUCOSE SERPL-MCNC: 125 MG/DL (ref 65–99)
HCT VFR BLD AUTO: 39.8 % (ref 34–46.6)
HGB BLD-MCNC: 13 G/DL (ref 12–15.9)
IMM GRANULOCYTES # BLD AUTO: 0.04 10*3/MM3 (ref 0–0.05)
IMM GRANULOCYTES NFR BLD AUTO: 0.4 % (ref 0–0.5)
LIPASE SERPL-CCNC: 40 U/L (ref 13–60)
LYMPHOCYTES # BLD AUTO: 2.21 10*3/MM3 (ref 0.7–3.1)
LYMPHOCYTES NFR BLD AUTO: 21.8 % (ref 19.6–45.3)
MAGNESIUM SERPL-MCNC: 2 MG/DL (ref 1.6–2.4)
MCH RBC QN AUTO: 32.3 PG (ref 26.6–33)
MCHC RBC AUTO-ENTMCNC: 32.7 G/DL (ref 31.5–35.7)
MCV RBC AUTO: 98.8 FL (ref 79–97)
MONOCYTES # BLD AUTO: 0.76 10*3/MM3 (ref 0.1–0.9)
MONOCYTES NFR BLD AUTO: 7.5 % (ref 5–12)
NEUTROPHILS NFR BLD AUTO: 6.78 10*3/MM3 (ref 1.7–7)
NEUTROPHILS NFR BLD AUTO: 66.7 % (ref 42.7–76)
NRBC BLD AUTO-RTO: 0 /100 WBC (ref 0–0.2)
NT-PROBNP SERPL-MCNC: 55.6 PG/ML (ref 0–1800)
PLATELET # BLD AUTO: 393 10*3/MM3 (ref 140–450)
PMV BLD AUTO: 8.9 FL (ref 6–12)
POTASSIUM SERPL-SCNC: 4.1 MMOL/L (ref 3.5–5.2)
PROCALCITONIN SERPL-MCNC: 0.04 NG/ML (ref 0–0.25)
PROT SERPL-MCNC: 7.1 G/DL (ref 6–8.5)
RBC # BLD AUTO: 4.03 10*6/MM3 (ref 3.77–5.28)
RSV RNA RESP QL NAA+PROBE: NOT DETECTED
SARS-COV-2 RNA RESP QL NAA+PROBE: NOT DETECTED
SODIUM SERPL-SCNC: 139 MMOL/L (ref 136–145)
TROPONIN T DELTA: -3 NG/L
TROPONIN T SERPL HS-MCNC: 15 NG/L
WBC NRBC COR # BLD AUTO: 10.15 10*3/MM3 (ref 3.4–10.8)

## 2024-07-30 PROCEDURE — 83880 ASSAY OF NATRIURETIC PEPTIDE: CPT | Performed by: STUDENT IN AN ORGANIZED HEALTH CARE EDUCATION/TRAINING PROGRAM

## 2024-07-30 PROCEDURE — 96361 HYDRATE IV INFUSION ADD-ON: CPT

## 2024-07-30 PROCEDURE — 87086 URINE CULTURE/COLONY COUNT: CPT | Performed by: STUDENT IN AN ORGANIZED HEALTH CARE EDUCATION/TRAINING PROGRAM

## 2024-07-30 PROCEDURE — 83690 ASSAY OF LIPASE: CPT | Performed by: STUDENT IN AN ORGANIZED HEALTH CARE EDUCATION/TRAINING PROGRAM

## 2024-07-30 PROCEDURE — 81001 URINALYSIS AUTO W/SCOPE: CPT | Performed by: STUDENT IN AN ORGANIZED HEALTH CARE EDUCATION/TRAINING PROGRAM

## 2024-07-30 PROCEDURE — 84484 ASSAY OF TROPONIN QUANT: CPT | Performed by: STUDENT IN AN ORGANIZED HEALTH CARE EDUCATION/TRAINING PROGRAM

## 2024-07-30 PROCEDURE — 36415 COLL VENOUS BLD VENIPUNCTURE: CPT

## 2024-07-30 PROCEDURE — 83735 ASSAY OF MAGNESIUM: CPT | Performed by: STUDENT IN AN ORGANIZED HEALTH CARE EDUCATION/TRAINING PROGRAM

## 2024-07-30 PROCEDURE — 99284 EMERGENCY DEPT VISIT MOD MDM: CPT

## 2024-07-30 PROCEDURE — 83605 ASSAY OF LACTIC ACID: CPT | Performed by: STUDENT IN AN ORGANIZED HEALTH CARE EDUCATION/TRAINING PROGRAM

## 2024-07-30 PROCEDURE — 96375 TX/PRO/DX INJ NEW DRUG ADDON: CPT

## 2024-07-30 PROCEDURE — 87637 SARSCOV2&INF A&B&RSV AMP PRB: CPT | Performed by: STUDENT IN AN ORGANIZED HEALTH CARE EDUCATION/TRAINING PROGRAM

## 2024-07-30 PROCEDURE — 93005 ELECTROCARDIOGRAM TRACING: CPT | Performed by: STUDENT IN AN ORGANIZED HEALTH CARE EDUCATION/TRAINING PROGRAM

## 2024-07-30 PROCEDURE — 80053 COMPREHEN METABOLIC PANEL: CPT | Performed by: STUDENT IN AN ORGANIZED HEALTH CARE EDUCATION/TRAINING PROGRAM

## 2024-07-30 PROCEDURE — 96374 THER/PROPH/DIAG INJ IV PUSH: CPT

## 2024-07-30 PROCEDURE — 87040 BLOOD CULTURE FOR BACTERIA: CPT | Performed by: STUDENT IN AN ORGANIZED HEALTH CARE EDUCATION/TRAINING PROGRAM

## 2024-07-30 PROCEDURE — 25010000002 DIPHENHYDRAMINE PER 50 MG: Performed by: STUDENT IN AN ORGANIZED HEALTH CARE EDUCATION/TRAINING PROGRAM

## 2024-07-30 PROCEDURE — 25010000002 PROCHLORPERAZINE 10 MG/2ML SOLUTION: Performed by: STUDENT IN AN ORGANIZED HEALTH CARE EDUCATION/TRAINING PROGRAM

## 2024-07-30 PROCEDURE — 70450 CT HEAD/BRAIN W/O DYE: CPT

## 2024-07-30 PROCEDURE — 25010000002 ONDANSETRON PER 1 MG: Performed by: STUDENT IN AN ORGANIZED HEALTH CARE EDUCATION/TRAINING PROGRAM

## 2024-07-30 PROCEDURE — 85025 COMPLETE CBC W/AUTO DIFF WBC: CPT | Performed by: STUDENT IN AN ORGANIZED HEALTH CARE EDUCATION/TRAINING PROGRAM

## 2024-07-30 PROCEDURE — 86140 C-REACTIVE PROTEIN: CPT | Performed by: STUDENT IN AN ORGANIZED HEALTH CARE EDUCATION/TRAINING PROGRAM

## 2024-07-30 PROCEDURE — 25010000002 KETOROLAC TROMETHAMINE PER 15 MG: Performed by: STUDENT IN AN ORGANIZED HEALTH CARE EDUCATION/TRAINING PROGRAM

## 2024-07-30 PROCEDURE — 25810000003 SODIUM CHLORIDE 0.9 % SOLUTION: Performed by: STUDENT IN AN ORGANIZED HEALTH CARE EDUCATION/TRAINING PROGRAM

## 2024-07-30 PROCEDURE — 84145 PROCALCITONIN (PCT): CPT | Performed by: STUDENT IN AN ORGANIZED HEALTH CARE EDUCATION/TRAINING PROGRAM

## 2024-07-30 RX ORDER — PROCHLORPERAZINE EDISYLATE 5 MG/ML
5 INJECTION INTRAMUSCULAR; INTRAVENOUS ONCE
Status: COMPLETED | OUTPATIENT
Start: 2024-07-30 | End: 2024-07-30

## 2024-07-30 RX ORDER — KETOROLAC TROMETHAMINE 15 MG/ML
15 INJECTION, SOLUTION INTRAMUSCULAR; INTRAVENOUS ONCE
Status: COMPLETED | OUTPATIENT
Start: 2024-07-30 | End: 2024-07-30

## 2024-07-30 RX ORDER — ACETAMINOPHEN 500 MG
1000 TABLET ORAL ONCE
Status: COMPLETED | OUTPATIENT
Start: 2024-07-30 | End: 2024-07-30

## 2024-07-30 RX ORDER — DIPHENHYDRAMINE HYDROCHLORIDE 50 MG/ML
25 INJECTION INTRAMUSCULAR; INTRAVENOUS ONCE
Status: COMPLETED | OUTPATIENT
Start: 2024-07-30 | End: 2024-07-30

## 2024-07-30 RX ORDER — ONDANSETRON 2 MG/ML
4 INJECTION INTRAMUSCULAR; INTRAVENOUS ONCE
Status: COMPLETED | OUTPATIENT
Start: 2024-07-30 | End: 2024-07-30

## 2024-07-30 RX ADMIN — SODIUM CHLORIDE 1000 ML: 9 INJECTION, SOLUTION INTRAVENOUS at 21:03

## 2024-07-30 RX ADMIN — ONDANSETRON 4 MG: 2 INJECTION INTRAMUSCULAR; INTRAVENOUS at 21:07

## 2024-07-30 RX ADMIN — PROCHLORPERAZINE EDISYLATE 5 MG: 5 INJECTION INTRAMUSCULAR; INTRAVENOUS at 21:07

## 2024-07-30 RX ADMIN — DIPHENHYDRAMINE HYDROCHLORIDE 25 MG: 50 INJECTION INTRAMUSCULAR; INTRAVENOUS at 21:05

## 2024-07-30 RX ADMIN — ACETAMINOPHEN 1000 MG: 500 TABLET ORAL at 21:11

## 2024-07-30 RX ADMIN — KETOROLAC TROMETHAMINE 15 MG: 15 INJECTION, SOLUTION INTRAMUSCULAR; INTRAVENOUS at 21:04

## 2024-07-31 LAB
BACTERIA UR QL AUTO: ABNORMAL /HPF
BILIRUB UR QL STRIP: NEGATIVE
CLARITY UR: CLEAR
COLOR UR: YELLOW
GLUCOSE UR STRIP-MCNC: NEGATIVE MG/DL
HGB UR QL STRIP.AUTO: NEGATIVE
HYALINE CASTS UR QL AUTO: ABNORMAL /LPF
KETONES UR QL STRIP: ABNORMAL
LEUKOCYTE ESTERASE UR QL STRIP.AUTO: ABNORMAL
NITRITE UR QL STRIP: NEGATIVE
PH UR STRIP.AUTO: 5.5 [PH] (ref 5–8)
PROT UR QL STRIP: ABNORMAL
QT INTERVAL: 402 MS
QTC INTERVAL: 418 MS
RBC # UR STRIP: ABNORMAL /HPF
REF LAB TEST METHOD: ABNORMAL
SP GR UR STRIP: 1.02 (ref 1–1.03)
SQUAMOUS #/AREA URNS HPF: ABNORMAL /HPF
UROBILINOGEN UR QL STRIP: ABNORMAL
WBC # UR STRIP: ABNORMAL /HPF

## 2024-07-31 RX ORDER — ONDANSETRON 4 MG/1
4 TABLET, ORALLY DISINTEGRATING ORAL 4 TIMES DAILY PRN
Qty: 12 TABLET | Refills: 0 | Status: SHIPPED | OUTPATIENT
Start: 2024-07-31

## 2024-07-31 RX ORDER — IBUPROFEN 600 MG/1
600 TABLET ORAL EVERY 6 HOURS PRN
Qty: 20 TABLET | Refills: 0 | Status: SHIPPED | OUTPATIENT
Start: 2024-07-31

## 2024-07-31 RX ORDER — CEFDINIR 300 MG/1
300 CAPSULE ORAL 2 TIMES DAILY
Qty: 14 CAPSULE | Refills: 0 | Status: SHIPPED | OUTPATIENT
Start: 2024-07-31 | End: 2024-08-07

## 2024-07-31 NOTE — DISCHARGE INSTRUCTIONS
Use the provided medications to help with symptoms and take the provided antibiotic as directed.  Follow-up with your PCP.  While today's workup was reassuring if your symptoms change or worsen please return to the ED or seek other medical care.

## 2024-07-31 NOTE — ED PROVIDER NOTES
EMERGENCY DEPARTMENT ENCOUNTER    Pt Name: Sandy Brownlee  MRN: [id number]  Pt :   1945  Room Number:    Date of encounter:  2024  PCP: Sylvester Roman MD  ED Provider: Leandro Catalan MD    Historian: Patient, family at bedside      HPI:  Chief Complaint: Headache, nausea, vomiting        Context: Sandy Brownlee is a 78-year-old woman who presents the emergency department because of acute severe frontal headache with associated nausea and vomiting.  She says it started about an hour prior to arrival.  She underwent Mohs surgery of the scalp yesterday.  She feels like the headache preceded the nausea and vomiting.  No weakness or numbness.  Denies abdominal pain.  No other complaints at this time.      PAST MEDICAL HISTORY  Past Medical History:   Diagnosis Date    Acute respiratory failure with hypoxia 2022 [facility]     Pneumonia of both lower lobes due to infectious organism 2022    She had left > right lower lobe and had apices bilaterally 2022    Skin lesion of right leg 2017    precancerous    Vertigo 2004     2nd HTN         PAST SURGICAL HISTORY  Past Surgical History:   Procedure Laterality Date    APPENDECTOMY      COLONOSCOPY      HYSTERECTOMY  1986    SKIN CANCER EXCISION      Right arm Dr Partida         FAMILY HISTORY  Family History   Problem Relation Age of Onset    Hyperlipidemia Mother     Hypertension Mother     Diabetes Maternal Aunt     Obesity Maternal Aunt     Colon cancer Neg Hx          SOCIAL HISTORY  Social History     Socioeconomic History    Marital status:      Spouse name: Vince    Number of children: 2    Years of education: 16    Highest education level: Some college, no degree   Tobacco Use    Smoking status: Never    Smokeless tobacco: Never   Vaping Use    Vaping status: Never Used   Substance and Sexual Activity    Alcohol use: Yes     Alcohol/week: 2.0 standard drinks of alcohol     Types: 2 Glasses  of wine per week     Comment: nightly     Drug use: Never    Sexual activity: Yes     Partners: Male     Birth control/protection: None         ALLERGIES  Hydrocodone, Codeine, and Morphine        REVIEW OF SYSTEMS  Review of Systems       All systems reviewed and negative except for those discussed in HPI.       PHYSICAL EXAM    I have reviewed the triage vital signs and nursing notes.    ED Triage Vitals [07/30/24 1953]   Temp Heart Rate Resp BP SpO2   96.5 °F (35.8 °C) 85 18 (!) 200/94 97 %      Temp src Heart Rate Source Patient Position BP Location FiO2 (%)   Oral Monitor Sitting Right arm --       Physical Exam  GENERAL:   Appears in obvious pain and discomfort,  HENT: Nares patent.  Staples in place over the right scalp consistent with recent Mohs procedure appears clean and well-healing  EYES: No scleral icterus.  CV: Regular rhythm, regular rate.  RESPIRATORY: Normal effort.  No audible wheezes, rales or rhonchi.  ABDOMEN: Soft, nontender  MUSCULOSKELETAL: No deformities.   NEURO: Alert, moves all extremities, follows commands.  SKIN: Warm, dry, no rash visualized.      LAB RESULTS  Recent Results (from the past 24 hour(s))   COVID-19, FLU A/B, RSV PCR 1 HR TAT - Swab, Nasopharynx    Collection Time: 07/30/24  8:55 PM    Specimen: Nasopharynx; Swab   Result Value Ref Range    COVID19 Not Detected Not Detected - Ref. Range    Influenza A PCR Not Detected Not Detected    Influenza B PCR Not Detected Not Detected    RSV, PCR Not Detected Not Detected   Comprehensive Metabolic Panel    Collection Time: 07/30/24  8:55 PM    Specimen: Blood   Result Value Ref Range    Glucose 125 (H) 65 - 99 mg/dL    BUN 15 8 - 23 mg/dL    Creatinine 0.99 0.57 - 1.00 mg/dL    Sodium 139 136 - 145 mmol/L    Potassium 4.1 3.5 - 5.2 mmol/L    Chloride 100 98 - 107 mmol/L    CO2 24.0 22.0 - 29.0 mmol/L    Calcium 10.0 8.6 - 10.5 mg/dL    Total Protein 7.1 6.0 - 8.5 g/dL    Albumin 4.4 3.5 - 5.2 g/dL    ALT (SGPT) 15 1 - 33 U/L    AST  (SGOT) 24 1 - 32 U/L    Alkaline Phosphatase 97 39 - 117 U/L    Total Bilirubin 0.2 0.0 - 1.2 mg/dL    Globulin 2.7 gm/dL    A/G Ratio 1.6 g/dL    BUN/Creatinine Ratio 15.2 7.0 - 25.0    Anion Gap 15.0 5.0 - 15.0 mmol/L    eGFR 58.5 (L) >60.0 mL/min/1.73   Lipase    Collection Time: 07/30/24  8:55 PM    Specimen: Blood   Result Value Ref Range    Lipase 40 13 - 60 U/L   High Sensitivity Troponin T    Collection Time: 07/30/24  8:55 PM    Specimen: Blood   Result Value Ref Range    HS Troponin T 15 (H) <14 ng/L   BNP    Collection Time: 07/30/24  8:55 PM    Specimen: Blood   Result Value Ref Range    proBNP 55.6 0.0 - 1,800.0 pg/mL   Lactic Acid, Plasma    Collection Time: 07/30/24  8:55 PM    Specimen: Blood   Result Value Ref Range    Lactate 1.3 0.5 - 2.0 mmol/L   Procalcitonin    Collection Time: 07/30/24  8:55 PM    Specimen: Blood   Result Value Ref Range    Procalcitonin 0.04 0.00 - 0.25 ng/mL   C-reactive Protein    Collection Time: 07/30/24  8:55 PM    Specimen: Blood   Result Value Ref Range    C-Reactive Protein <0.30 0.00 - 0.50 mg/dL   Magnesium    Collection Time: 07/30/24  8:55 PM    Specimen: Blood   Result Value Ref Range    Magnesium 2.0 1.6 - 2.4 mg/dL   CBC Auto Differential    Collection Time: 07/30/24  8:55 PM    Specimen: Blood   Result Value Ref Range    WBC 10.15 3.40 - 10.80 10*3/mm3    RBC 4.03 3.77 - 5.28 10*6/mm3    Hemoglobin 13.0 12.0 - 15.9 g/dL    Hematocrit 39.8 34.0 - 46.6 %    MCV 98.8 (H) 79.0 - 97.0 fL    MCH 32.3 26.6 - 33.0 pg    MCHC 32.7 31.5 - 35.7 g/dL    RDW 12.2 (L) 12.3 - 15.4 %    RDW-SD 44.2 37.0 - 54.0 fl    MPV 8.9 6.0 - 12.0 fL    Platelets 393 140 - 450 10*3/mm3    Neutrophil % 66.7 42.7 - 76.0 %    Lymphocyte % 21.8 19.6 - 45.3 %    Monocyte % 7.5 5.0 - 12.0 %    Eosinophil % 3.0 0.3 - 6.2 %    Basophil % 0.6 0.0 - 1.5 %    Immature Grans % 0.4 0.0 - 0.5 %    Neutrophils, Absolute 6.78 1.70 - 7.00 10*3/mm3    Lymphocytes, Absolute 2.21 0.70 - 3.10 10*3/mm3     Monocytes, Absolute 0.76 0.10 - 0.90 10*3/mm3    Eosinophils, Absolute 0.30 0.00 - 0.40 10*3/mm3    Basophils, Absolute 0.06 0.00 - 0.20 10*3/mm3    Immature Grans, Absolute 0.04 0.00 - 0.05 10*3/mm3    nRBC 0.0 0.0 - 0.2 /100 WBC   ECG 12 Lead Electrolyte Imbalance    Collection Time: 07/30/24  8:57 PM   Result Value Ref Range    QT Interval 402 ms    QTC Interval 418 ms   High Sensitivity Troponin T 2Hr    Collection Time: 07/30/24 11:17 PM    Specimen: Blood   Result Value Ref Range    HS Troponin T 12 <14 ng/L    Troponin T Delta -3 >=-4 - <+4 ng/L   Urinalysis With Microscopic If Indicated (No Culture) - Urine, Clean Catch    Collection Time: 07/30/24 11:22 PM    Specimen: Urine, Clean Catch   Result Value Ref Range    Color, UA Yellow Yellow, Straw    Appearance, UA Clear Clear    pH, UA 5.5 5.0 - 8.0    Specific Gravity, UA 1.025 1.001 - 1.030    Glucose, UA Negative Negative    Ketones, UA 15 mg/dL (1+) (A) Negative    Bilirubin, UA Negative Negative    Blood, UA Negative Negative    Protein, UA Trace (A) Negative    Leuk Esterase, UA Small (1+) (A) Negative    Nitrite, UA Negative Negative    Urobilinogen, UA 0.2 E.U./dL 0.2 - 1.0 E.U./dL   Urinalysis, Microscopic Only - Urine, Clean Catch    Collection Time: 07/30/24 11:22 PM    Specimen: Urine, Clean Catch   Result Value Ref Range    RBC, UA 3-5 (A) None Seen, 0-2 /HPF    WBC, UA 21-50 (A) None Seen, 0-2 /HPF    Bacteria, UA None Seen None Seen, Trace /HPF    Squamous Epithelial Cells, UA 0-2 None Seen, 0-2 /HPF    Hyaline Casts, UA 21-30 0 - 6 /LPF    Methodology Automated Microscopy        If labs were ordered, I independently reviewed the results and considered them in treating the patient.        RADIOLOGY  CT Head Without Contrast    Result Date: 7/30/2024  CT HEAD WO CONTRAST Date of Exam: 7/30/2024 8:42 PM EDT Indication: Thunderclap frontal headache 1 hour ago with nausea and vomiting, hypertension, recent Mohs procedure. Comparison: MRI June 19,  2024. CT head May 7, 2014 Technique: Axial CT images were obtained of the head without contrast administration.  Automated exposure control and iterative construction methods were used. Findings: No midline shift. Basal cisterns appear patent.  Ventricles and sulci appear within normal limits for patient age. No extra-axial collection or acute hemorrhage is identified.  No definite mass lesion, edema, or significant mass effect is seen.  No definite CT findings of acute infarction. Paranasal sinuses appear clear.  Mastoid air cells appear clear.  No acute calvarial abnormality is identified. Skin staple line is seen near the right scalp vertex.     Impression: 1.No CT findings of acute intracranial abnormality. 2.Skin staple line near the right scalp vertex. Electronically Signed: Sumanth Ring  7/30/2024 9:04 PM EDT  Workstation ID: [id number]     I ordered and independently reviewed the above noted radiographic studies.      I viewed images of head CT which showed no acute bleeding or other acute pathology that I can appreciate per my independent interpretation.    See radiologist's dictation for official interpretation.        PROCEDURES    Procedures    ECG 12 Lead Electrolyte Imbalance   Preliminary Result   Test Reason : Electrolyte Imbalance   Blood Pressure :   */*   mmHG   Vent. Rate :  65 BPM     Atrial Rate :  65 BPM      P-R Int : 136 ms          QRS Dur :  86 ms       QT Int : 402 ms       P-R-T Axes :  71  84  64 degrees      QTc Int : 418 ms      Normal sinus rhythm   Normal ECG   When compared with ECG of 24-MAY-2024 17:21,   No significant change was found      Referred By: EDMD           Confirmed By:           MEDICATIONS GIVEN IN ER    Medications   ondansetron (ZOFRAN) injection 4 mg (4 mg Intravenous Given 7/30/24 2107)   ketorolac (TORADOL) injection 15 mg (15 mg Intravenous Given 7/30/24 2104)   acetaminophen (TYLENOL) tablet 1,000 mg (1,000 mg Oral Given 7/30/24 2111)   sodium chloride 0.9 %  bolus 1,000 mL (0 mL Intravenous Stopped 7/30/24 2203)   prochlorperazine (COMPAZINE) injection 5 mg (5 mg Intravenous Given 7/30/24 2107)   diphenhydrAMINE (BENADRYL) injection 25 mg (25 mg Intravenous Given 7/30/24 2105)         MEDICAL DECISION MAKING, PROGRESS, and CONSULTS    All labs, if obtained, have been independently reviewed by me.  All radiology studies, if obtained, have been reviewed by me and the radiologist dictating the report.  All EKG's, if obtained, have been independently viewed and interpreted by me/my attending physician.      Discussion below represents my analysis of pertinent findings related to patient's condition, differential diagnosis, treatment plan and final disposition.                         Differential diagnosis:    Subarachnoid hemorrhage, migraine headache, meningitis, intra-abdominal infection, urinary tract infection, sepsis, anemia, electrolyte abnormality      Additional sources:    - Discussed/ obtained information from independent historians: Family at bedside    - External (non-ED) record review: PCP notes with Dr. Roman shows history of hypertension, memory loss, protein calorie malnutrition, respiratory failure, peripheral vascular disease, hip pain, hyperlipidemia, neurology notes for mild cognitive impairment    - Chronic or social conditions impacting care: Hypertension, hyperlipidemia,    - Shared decision making: Patient/patient representative in complete agreement with current plans for evaluation and management.      Orders placed during this visit:  Orders Placed This Encounter   Procedures    COVID PRE-OP / PRE-PROCEDURE SCREENING ORDER (NO ISOLATION) - Swab, Nasopharynx    COVID-19, FLU A/B, RSV PCR 1 HR TAT - Swab, Nasopharynx    Blood Culture - Blood,    Blood Culture - Blood,    Urine Culture - Urine,    CT Head Without Contrast    Comprehensive Metabolic Panel    Lipase    Urinalysis With Microscopic If Indicated (No Culture) - Urine, Clean Catch    High  Sensitivity Troponin T    BNP    Lactic Acid, Plasma    Procalcitonin    C-reactive Protein    Magnesium    CBC Auto Differential    High Sensitivity Troponin T 2Hr    Urinalysis, Microscopic Only - Urine, Clean Catch    ECG 12 Lead Electrolyte Imbalance    CBC & Differential         Additional orders considered but not ordered:      ED Course:    Consultants:      ED Course as of 07/31/24 0250   Tue Jul 30, 2024 2031 This is a 78-year-old woman who presents the emergency department because of acute severe frontal headache with associated nausea and vomiting.  She says it started about an hour prior to arrival.  She underwent Mohs surgery of the scalp yesterday.  She feels like the headache preceded the nausea and vomiting.  No weakness or numbness.  Denies abdominal pain.  No other complaints at this time. [CC]   Wed Jul 31, 2024   0247 She arrived awake and alert afebrile initially profoundly hypertensive on initial triage vital 200/94 however I suspect this was erroneous on repeat check only moments later she was nearly normotensive.  She was however in obvious discomfort ongoing retching, no appreciated focal deficits on neurologic exam.  She is describing thunderclap frontal headache with nausea and vomiting concern for intracranial hemorrhage ordered stat Noncon head CT I have contacted the CT technician and let them know that this needs to be done emergently and patient was taken directly to CT scanner fortunately no abnormal findings on CT scan.  She was treated with migraine cocktail with resolution of her headache and symptoms. [CC]   0248 CBC and CMP were reassuring and nonactionable.  No elevation in lactic acid.  Initial troponin borderline elevated at 15 but a repeat troponin is not elevated and does not have significant delta has now down trended to 12.  No elevation in procalcitonin or C-reactive protein no elevation in lipase.  COVID and flu swab is negative urinalysis is consistent with urinary  tract infection she continues to deny abdominal pain and abdominal exam remains benign.  Upon reevaluation she is describing complete resolution of her symptoms I think she can safely discharge counseled on good sleep hygiene and good oral hydration and treating headaches with over-the-counter Excedrin Migraine she will follow-up with her PCP given antibiotics for the urinary tract infection, discharged in stable condition with strict return precautions. [CC]      ED Course User Index  [CC] Leandro Catalan MD              Shared Decision Making:  After my consideration of clinical presentation and any laboratory/radiology studies obtained, I discussed the findings with the patient/patient representative who is in agreement with the treatment plan and the final disposition.   Risks and benefits of discharge and/or observation/admission were discussed.       AS OF 02:50 EDT VITALS:    BP - 128/57  HR - 75  TEMP - 96.5 °F (35.8 °C) (Oral)  O2 SATS - 96%                  DIAGNOSIS  Final diagnoses:   Acute nonintractable headache, unspecified headache type   Acute UTI (urinary tract infection)   Nausea and vomiting, unspecified vomiting type         DISPOSITION  DISCHARGE    Patient discharged in stable condition.    Reviewed implications of results, diagnosis, meds, responsibility to follow up, warning signs and symptoms of possible worsening, potential complications and reasons to return to ER.    Patient/Family voiced understanding of above instructions.    Discussed plan for discharge, as there is no emergent indication for admission.  Pt/family is agreeable and understands need for follow up and possible repeat testing.  Pt/family is aware that discharge does not mean that nothing is wrong but that it indicates no emergency is currently present that requires admission and they must continue care with follow-up as given below or with a physician of their choice.     FOLLOW-UP  Sylvester Roman MD  [id number]  [place] [id number] [phone]    Call            Medication List        New Prescriptions      cefdinir 300 MG capsule  Commonly known as: OMNICEF  Take 1 capsule by mouth 2 (Two) Times a Day for 7 days.     ibuprofen 600 MG tablet  Commonly known as: ADVIL,MOTRIN  Take 1 tablet by mouth Every 6 (Six) Hours As Needed for Mild Pain.     ondansetron ODT 4 MG disintegrating tablet  Commonly known as: ZOFRAN-ODT  Place 1 tablet on the tongue 4 (Four) Times a Day As Needed for Nausea or Vomiting.               Where to Get Your Medications        These medications were sent to [id number] - [place], [place] - [address] [facility] [phone] [facility] [phone]   [address] [address]      Phone: [phone]   cefdinir 300 MG capsule  ibuprofen 600 MG tablet  ondansetron ODT 4 MG disintegrating tablet             Please note that portions of this document were completed with voice recognition software.        Leandro Catalan MD  07/31/24 0250

## 2024-08-01 LAB — BACTERIA SPEC AEROBE CULT: NO GROWTH

## 2024-08-04 LAB
BACTERIA SPEC AEROBE CULT: NORMAL
BACTERIA SPEC AEROBE CULT: NORMAL

## 2024-08-07 LAB
QT INTERVAL: 402 MS
QTC INTERVAL: 418 MS

## 2024-08-12 RX ORDER — LISINOPRIL AND HYDROCHLOROTHIAZIDE 20; 12.5 MG/1; MG/1
1 TABLET ORAL DAILY
Qty: 90 TABLET | Refills: 1 | Status: SHIPPED | OUTPATIENT
Start: 2024-08-12

## 2024-08-12 NOTE — TELEPHONE ENCOUNTER
Rx Refill Note  Requested Prescriptions     Pending Prescriptions Disp Refills    lisinopril-hydrochlorothiazide (PRINZIDE,ZESTORETIC) 20-12.5 MG per tablet [Pharmacy Med Name: LISINOPRIL-HCTZ 20/12.5MG TABLETS] 90 tablet 1     Sig: TAKE 1 TABLET BY MOUTH DAILY      Last office visit with prescribing clinician: 5/8/2024   Last telemedicine visit with prescribing clinician: Visit date not found   Next office visit with prescribing clinician: 11/8/2024     Flagged with warnings.    Danielle Meyer MA  08/12/24, 09:08 EDT

## 2024-08-15 DIAGNOSIS — E03.9 ACQUIRED HYPOTHYROIDISM: ICD-10-CM

## 2024-08-15 RX ORDER — LEVOTHYROXINE SODIUM 0.05 MG/1
50 TABLET ORAL DAILY
Qty: 30 TABLET | Refills: 2 | Status: SHIPPED | OUTPATIENT
Start: 2024-08-15

## 2024-08-15 NOTE — TELEPHONE ENCOUNTER
Rx Refill Note  Requested Prescriptions     Pending Prescriptions Disp Refills    levothyroxine (SYNTHROID, LEVOTHROID) 50 MCG tablet [Pharmacy Med Name: LEVOTHYROXINE 0.05MG (50MCG) TAB] 30 tablet 2     Sig: TAKE 1 TABLET BY MOUTH DAILY      Last office visit with prescribing clinician: 5/8/2024   Last telemedicine visit with prescribing clinician: Visit date not found   Next office visit with prescribing clinician: 11/8/2024                         Would you like a call back once the refill request has been completed: [] Yes [] No    If the office needs to give you a call back, can they leave a voicemail: [] Yes [] No    Angeles Rutherford MA  08/15/24, 08:49 EDT

## 2024-08-26 ENCOUNTER — OFFICE VISIT (OUTPATIENT)
Dept: INTERNAL MEDICINE | Facility: CLINIC | Age: 79
End: 2024-08-26
Payer: MEDICARE

## 2024-08-26 VITALS
DIASTOLIC BLOOD PRESSURE: 64 MMHG | HEART RATE: 76 BPM | WEIGHT: 133 LBS | HEIGHT: 67 IN | BODY MASS INDEX: 20.88 KG/M2 | RESPIRATION RATE: 17 BRPM | OXYGEN SATURATION: 98 % | SYSTOLIC BLOOD PRESSURE: 98 MMHG

## 2024-08-26 DIAGNOSIS — R53.83 OTHER FATIGUE: Primary | ICD-10-CM

## 2024-08-26 DIAGNOSIS — K58.2 IRRITABLE BOWEL SYNDROME WITH BOTH CONSTIPATION AND DIARRHEA: ICD-10-CM

## 2024-08-26 DIAGNOSIS — E78.5 HYPERLIPIDEMIA LDL GOAL <100: ICD-10-CM

## 2024-08-26 DIAGNOSIS — E55.9 VITAMIN D DEFICIENCY: ICD-10-CM

## 2024-08-26 DIAGNOSIS — R42 DIZZINESS: ICD-10-CM

## 2024-08-26 DIAGNOSIS — T67.1XXA HEAT SYNCOPE, INITIAL ENCOUNTER: ICD-10-CM

## 2024-08-26 DIAGNOSIS — I10 BENIGN ESSENTIAL HYPERTENSION: ICD-10-CM

## 2024-08-26 DIAGNOSIS — R74.8 ELEVATED LIPASE: ICD-10-CM

## 2024-08-26 DIAGNOSIS — E16.2 HYPOGLYCEMIA: ICD-10-CM

## 2024-08-26 PROCEDURE — 1159F MED LIST DOCD IN RCRD: CPT | Performed by: INTERNAL MEDICINE

## 2024-08-26 PROCEDURE — 3074F SYST BP LT 130 MM HG: CPT | Performed by: INTERNAL MEDICINE

## 2024-08-26 PROCEDURE — 3078F DIAST BP <80 MM HG: CPT | Performed by: INTERNAL MEDICINE

## 2024-08-26 PROCEDURE — 1160F RVW MEDS BY RX/DR IN RCRD: CPT | Performed by: INTERNAL MEDICINE

## 2024-08-26 PROCEDURE — 1126F AMNT PAIN NOTED NONE PRSNT: CPT | Performed by: INTERNAL MEDICINE

## 2024-08-26 PROCEDURE — 99215 OFFICE O/P EST HI 40 MIN: CPT | Performed by: INTERNAL MEDICINE

## 2024-08-26 RX ORDER — DONEPEZIL HYDROCHLORIDE 10 MG/1
10 TABLET, FILM COATED ORAL NIGHTLY
COMMUNITY
Start: 2024-08-14

## 2024-08-26 RX ORDER — ACYCLOVIR 400 MG/1
1 TABLET ORAL
Qty: 1 EACH | Refills: 0 | COMMUNITY
Start: 2024-08-26

## 2024-08-26 NOTE — ASSESSMENT & PLAN NOTE
Check extensive labs, get carotid ultrasound, use dexcom7 to monitor blood sugar and record BP and pulse

## 2024-08-26 NOTE — ASSESSMENT & PLAN NOTE
Lipid abnormalities are stable    Plan:  Continue same medication/s without change.      Discussed medication dosage, use, side effects, and goals of treatment in detail.    Counseled patient on lifestyle modifications to help control hyperlipidemia.     Patient Treatment Goals:   .  LDL goal is under 100    Followup in 3 months.

## 2024-08-26 NOTE — ASSESSMENT & PLAN NOTE
Predominantly constipation and now with some loose stools with the konsyl and miralax. Follow with Dr Hatch.

## 2024-08-26 NOTE — PROGRESS NOTES
Chief Complaint   Patient presents with    Fatigue     x4mo    Constipation     x3wk    Dizziness     Counseling was given to patient and family for the following topics: diagnostic results, instructions for management, risk factor reductions, prognosis, patient and family education, impressions, risks and benefits of treatment options, and importance of treatment compliance . Total time of the encounter was 58 minutes   History of Present Illness  78 y.o. female presents for follow up of multiple ER visits and 2 episodes of syncope in the heat with prolong standing. She could tell she was going to pass out prior to passing out with increased fatigue and dizziness. She was helped to the ground and did not have shaking episodes She has had daily fatigue for the past few months and fluctuates some and not getting better but not worse. She felt similar when she had diabetes with pregnancy     Review of Systems   Constitutional:  Positive for appetite change (decreased appetite) and fatigue.   Gastrointestinal:  Positive for constipation and diarrhea.        Loose stools with miralax and konsyl.    Neurological:  Positive for dizziness.   Psychiatric/Behavioral:  Negative for dysphoric mood and sleep disturbance.      .    PMSFH:  The following portions of the patient's history were reviewed and updated as appropriate: allergies, current medications, past family history, past medical history, past social history, past surgical history and problem list.      Current Outpatient Medications:     atorvastatin (LIPITOR) 10 MG tablet, TAKE 1 TABLET BY MOUTH EVERY NIGHT AT BEDTIME, Disp: 90 tablet, Rfl: 1    cholecalciferol (VITAMIN D3) 250 MCG (46347 UT) capsule, Take 1 capsule by mouth Daily., Disp: , Rfl:     ibuprofen (ADVIL,MOTRIN) 600 MG tablet, Take 1 tablet by mouth Every 6 (Six) Hours As Needed for Mild Pain., Disp: 20 tablet, Rfl: 0    levothyroxine (SYNTHROID, LEVOTHROID) 50 MCG tablet, TAKE 1 TABLET BY MOUTH DAILY,  "Disp: 30 tablet, Rfl: 2    lisinopril-hydrochlorothiazide (PRINZIDE,ZESTORETIC) 20-12.5 MG per tablet, TAKE 1 TABLET BY MOUTH DAILY, Disp: 90 tablet, Rfl: 1    multivitamin with minerals tablet tablet, Take 1 tablet by mouth Daily., Disp: , Rfl:     Polyethylene Glycol 3350 (MIRALAX PO), Take  by mouth., Disp: , Rfl:     Continuous Glucose Sensor (Dexcom G7 Sensor) misc, Use 1 Device Every 10 (Ten) Days., Disp: 1 each, Rfl: 0    donepezil (ARICEPT) 10 MG tablet, Take 1 tablet by mouth Every Night., Disp: , Rfl:     donepezil (ARICEPT) 5 MG tablet, TAKE 1 TABLET BY MOUTH EVERY NIGHT, Disp: 90 tablet, Rfl: 1    ondansetron ODT (ZOFRAN-ODT) 4 MG disintegrating tablet, Place 1 tablet on the tongue 4 (Four) Times a Day As Needed for Nausea or Vomiting. (Patient not taking: Reported on 8/26/2024), Disp: 12 tablet, Rfl: 0    psyllium (KONSYL) 28.3 % pack pack, Take 1 packet by mouth Daily. (Patient not taking: Reported on 8/26/2024), Disp: , Rfl:     VITALS:  BP 98/64 (BP Location: Left arm, Patient Position: Sitting, Cuff Size: Adult)   Pulse 76   Resp 17   Ht 170.2 cm (67\")   Wt 60.3 kg (133 lb)   SpO2 98%   BMI 20.83 kg/m²     Physical Exam  Constitutional:       Appearance: Normal appearance.   Neck:      Vascular: No carotid bruit.   Cardiovascular:      Rate and Rhythm: Normal rate and regular rhythm.   Pulmonary:      Effort: Pulmonary effort is normal.      Breath sounds: No wheezing.   Abdominal:      General: Bowel sounds are normal.      Palpations: Abdomen is soft.      Tenderness: There is no abdominal tenderness. There is no guarding.   Musculoskeletal:         General: No swelling.   Neurological:      General: No focal deficit present.      Mental Status: She is alert and oriented to person, place, and time.   Psychiatric:         Mood and Affect: Mood normal.         Behavior: Behavior normal.         LABS:    CMP:  Lab Results   Component Value Date    BUN 15 07/30/2024    CREATININE 0.99 07/30/2024 "    EGFRIFNONA 78 12/14/2021     07/30/2024    K 4.1 07/30/2024     07/30/2024    CALCIUM 10.0 07/30/2024    ALBUMIN 4.4 07/30/2024    BILITOT 0.2 07/30/2024    ALKPHOS 97 07/30/2024    AST 24 07/30/2024    ALT 15 07/30/2024     CBC:  Lab Results   Component Value Date    WBC 10.15 07/30/2024    RBC 4.03 07/30/2024    HGB 13.0 07/30/2024    HCT 39.8 07/30/2024    MCV 98.8 (H) 07/30/2024    MCH 32.3 07/30/2024    MCHC 32.7 07/30/2024    RDW 12.2 (L) 07/30/2024     07/30/2024     TSH:  Lab Results   Component Value Date    TSH 5.640 (H) 05/10/2024     HGBA1C (LAST 3):  Lab Results   Component Value Date    HGBA1C 5.30 05/10/2024    HGBA1C 5.30 01/05/2024    HGBA1C 5.90 (H) 04/25/2023     UA:    Lab Results   Component Value Date    SQUAMEPIUA 0-2 07/30/2024    SPECGRAVUR 1.025 07/30/2024    KETONESU 15 mg/dL (1+) (A) 07/30/2024    BLOODU Negative 07/30/2024    LEUKOCYTESUR Small (1+) (A) 07/30/2024    NITRITEU Negative 07/30/2024    RBCUA 3-5 (A) 07/30/2024    WBCUA 21-50 (A) 07/30/2024    BACTERIA None Seen 07/30/2024     Procedures         ASSESSMENT/PLAN:  Diagnoses and all orders for this visit:    1. Other fatigue (Primary)  Assessment & Plan:  She has significant fatigue and will try and check BP and pulse and also given sample of Dexcom7 for 10 days monitor of blood sugar.     Orders:  -     CBC & Differential; Future  -     TSH; Future  -     T3, Free; Future  -     T4, Free; Future  -     Folate; Future  -     Vitamin B12; Future  -     Duplex Carotid Ultrasound CAR; Future    2. Dizziness  Assessment & Plan:  Check extensive labs, get carotid ultrasound, use dexcom7 to monitor blood sugar and record BP and pulse     Orders:  -     Duplex Carotid Ultrasound CAR; Future  -     Continuous Glucose Sensor (Dexcom G7 Sensor) misc; Use 1 Device Every 10 (Ten) Days.  Dispense: 1 each; Refill: 0    3. Benign essential hypertension  Assessment & Plan:   Encouraged to monitor BP and pulse and if  running low on BP will stop HCTZ     Orders:  -     Comprehensive Metabolic Panel; Future    4. Hypoglycemia  Comments:  Eat fiber and complex carbs and monitor blood sugar.  Orders:  -     Continuous Glucose Sensor (Dexcom G7 Sensor) misc; Use 1 Device Every 10 (Ten) Days.  Dispense: 1 each; Refill: 0    5. Irritable bowel syndrome with both constipation and diarrhea  Assessment & Plan:  Predominantly constipation and now with some loose stools with the konsyl and miralax. Follow with Dr Hatch.       6. Heat syncope, initial encounter  Comments:  Acute issue and get labs and carotid ultrasound Encouraged to keep records of BP and pulse and to do constant blood sugar measuring.    7. Elevated lipase  Comments:  Her lipase returned to normal when repeat end of July. Follow labs. Her pancreas enzymes were normal.  Orders:  -     Lipase; Future    8. Hyperlipidemia LDL goal <100  Assessment & Plan:   Lipid abnormalities are stable    Plan:  Continue same medication/s without change.      Discussed medication dosage, use, side effects, and goals of treatment in detail.    Counseled patient on lifestyle modifications to help control hyperlipidemia.     Patient Treatment Goals:   .  LDL goal is under 100    Followup in 3 months.    Orders:  -     Homocysteine; Future  -     Duplex Carotid Ultrasound CAR; Future    9. Vitamin D deficiency  Assessment & Plan:  Follow labs.     Orders:  -     Vitamin D,25-Hydroxy; Future        FOLLOW-UP:  Return in about 3 weeks (around 9/16/2024) for Recheck.      Electronically signed by:    Sylvester Roman MD

## 2024-08-26 NOTE — ASSESSMENT & PLAN NOTE
She has significant fatigue and will try and check BP and pulse and also given sample of Dexcom7 for 10 days monitor of blood sugar.

## 2024-08-27 ENCOUNTER — LAB (OUTPATIENT)
Dept: LAB | Facility: HOSPITAL | Age: 79
End: 2024-08-27
Payer: MEDICARE

## 2024-08-27 DIAGNOSIS — R74.8 ELEVATED LIPASE: ICD-10-CM

## 2024-08-27 DIAGNOSIS — E03.9 ACQUIRED HYPOTHYROIDISM: ICD-10-CM

## 2024-08-27 DIAGNOSIS — E55.9 VITAMIN D DEFICIENCY: ICD-10-CM

## 2024-08-27 DIAGNOSIS — R53.83 OTHER FATIGUE: ICD-10-CM

## 2024-08-27 DIAGNOSIS — E78.5 HYPERLIPIDEMIA LDL GOAL <100: ICD-10-CM

## 2024-08-27 DIAGNOSIS — I10 BENIGN ESSENTIAL HYPERTENSION: ICD-10-CM

## 2024-08-27 LAB
25(OH)D3 SERPL-MCNC: 77.6 NG/ML (ref 30–100)
ALBUMIN SERPL-MCNC: 4.5 G/DL (ref 3.5–5.2)
ALBUMIN/GLOB SERPL: 1.7 G/DL
ALP SERPL-CCNC: 79 U/L (ref 39–117)
ALT SERPL W P-5'-P-CCNC: 14 U/L (ref 1–33)
ANION GAP SERPL CALCULATED.3IONS-SCNC: 12.6 MMOL/L (ref 5–15)
AST SERPL-CCNC: 20 U/L (ref 1–32)
BASOPHILS # BLD AUTO: 0.04 10*3/MM3 (ref 0–0.2)
BASOPHILS NFR BLD AUTO: 0.8 % (ref 0–1.5)
BILIRUB SERPL-MCNC: 0.5 MG/DL (ref 0–1.2)
BUN SERPL-MCNC: 14 MG/DL (ref 8–23)
BUN/CREAT SERPL: 16.7 (ref 7–25)
CALCIUM SPEC-SCNC: 10.1 MG/DL (ref 8.6–10.5)
CHLORIDE SERPL-SCNC: 100 MMOL/L (ref 98–107)
CO2 SERPL-SCNC: 25.4 MMOL/L (ref 22–29)
CREAT SERPL-MCNC: 0.84 MG/DL (ref 0.57–1)
DEPRECATED RDW RBC AUTO: 40 FL (ref 37–54)
EGFRCR SERPLBLD CKD-EPI 2021: 71.2 ML/MIN/1.73
EOSINOPHIL # BLD AUTO: 0.2 10*3/MM3 (ref 0–0.4)
EOSINOPHIL NFR BLD AUTO: 4.1 % (ref 0.3–6.2)
ERYTHROCYTE [DISTWIDTH] IN BLOOD BY AUTOMATED COUNT: 11.7 % (ref 12.3–15.4)
FOLATE SERPL-MCNC: 20 NG/ML (ref 4.78–24.2)
GLOBULIN UR ELPH-MCNC: 2.6 GM/DL
GLUCOSE SERPL-MCNC: 113 MG/DL (ref 65–99)
HCT VFR BLD AUTO: 38.2 % (ref 34–46.6)
HCYS SERPL-MCNC: 10 UMOL/L (ref 0–15)
HGB BLD-MCNC: 13.1 G/DL (ref 12–15.9)
IMM GRANULOCYTES # BLD AUTO: 0.01 10*3/MM3 (ref 0–0.05)
IMM GRANULOCYTES NFR BLD AUTO: 0.2 % (ref 0–0.5)
LIPASE SERPL-CCNC: 64 U/L (ref 13–60)
LYMPHOCYTES # BLD AUTO: 1.79 10*3/MM3 (ref 0.7–3.1)
LYMPHOCYTES NFR BLD AUTO: 36.9 % (ref 19.6–45.3)
MCH RBC QN AUTO: 32.6 PG (ref 26.6–33)
MCHC RBC AUTO-ENTMCNC: 34.3 G/DL (ref 31.5–35.7)
MCV RBC AUTO: 95 FL (ref 79–97)
MONOCYTES # BLD AUTO: 0.42 10*3/MM3 (ref 0.1–0.9)
MONOCYTES NFR BLD AUTO: 8.7 % (ref 5–12)
NEUTROPHILS NFR BLD AUTO: 2.39 10*3/MM3 (ref 1.7–7)
NEUTROPHILS NFR BLD AUTO: 49.3 % (ref 42.7–76)
NRBC BLD AUTO-RTO: 0 /100 WBC (ref 0–0.2)
PLATELET # BLD AUTO: 314 10*3/MM3 (ref 140–450)
PMV BLD AUTO: 8.8 FL (ref 6–12)
POTASSIUM SERPL-SCNC: 4.2 MMOL/L (ref 3.5–5.2)
PROT SERPL-MCNC: 7.1 G/DL (ref 6–8.5)
RBC # BLD AUTO: 4.02 10*6/MM3 (ref 3.77–5.28)
SODIUM SERPL-SCNC: 138 MMOL/L (ref 136–145)
T3FREE SERPL-MCNC: 2.49 PG/ML (ref 2–4.4)
T4 FREE SERPL-MCNC: 1.5 NG/DL (ref 0.92–1.68)
TSH SERPL DL<=0.05 MIU/L-ACNC: 2.06 UIU/ML (ref 0.27–4.2)
VIT B12 BLD-MCNC: 520 PG/ML (ref 211–946)
WBC NRBC COR # BLD AUTO: 4.85 10*3/MM3 (ref 3.4–10.8)

## 2024-08-27 PROCEDURE — 80053 COMPREHEN METABOLIC PANEL: CPT

## 2024-08-27 PROCEDURE — 84481 FREE ASSAY (FT-3): CPT

## 2024-08-27 PROCEDURE — 82746 ASSAY OF FOLIC ACID SERUM: CPT

## 2024-08-27 PROCEDURE — 85025 COMPLETE CBC W/AUTO DIFF WBC: CPT

## 2024-08-27 PROCEDURE — 84439 ASSAY OF FREE THYROXINE: CPT

## 2024-08-27 PROCEDURE — 83690 ASSAY OF LIPASE: CPT

## 2024-08-27 PROCEDURE — 83090 ASSAY OF HOMOCYSTEINE: CPT

## 2024-08-27 PROCEDURE — 82306 VITAMIN D 25 HYDROXY: CPT

## 2024-08-27 PROCEDURE — 82607 VITAMIN B-12: CPT

## 2024-08-27 PROCEDURE — 36415 COLL VENOUS BLD VENIPUNCTURE: CPT

## 2024-08-27 PROCEDURE — 84443 ASSAY THYROID STIM HORMONE: CPT

## 2024-09-16 ENCOUNTER — TELEPHONE (OUTPATIENT)
Dept: GASTROENTEROLOGY | Facility: CLINIC | Age: 79
End: 2024-09-16
Payer: MEDICARE

## 2024-09-17 ENCOUNTER — OFFICE VISIT (OUTPATIENT)
Dept: INTERNAL MEDICINE | Facility: CLINIC | Age: 79
End: 2024-09-17
Payer: MEDICARE

## 2024-09-17 VITALS
WEIGHT: 136 LBS | TEMPERATURE: 98.4 F | DIASTOLIC BLOOD PRESSURE: 62 MMHG | BODY MASS INDEX: 21.35 KG/M2 | HEART RATE: 68 BPM | SYSTOLIC BLOOD PRESSURE: 112 MMHG | HEIGHT: 67 IN

## 2024-09-17 DIAGNOSIS — K58.2 IRRITABLE BOWEL SYNDROME WITH BOTH CONSTIPATION AND DIARRHEA: Primary | ICD-10-CM

## 2024-09-17 DIAGNOSIS — R42 DIZZINESS: ICD-10-CM

## 2024-09-17 DIAGNOSIS — R53.83 OTHER FATIGUE: ICD-10-CM

## 2024-09-17 DIAGNOSIS — E03.9 ACQUIRED HYPOTHYROIDISM: ICD-10-CM

## 2024-09-17 PROCEDURE — 1160F RVW MEDS BY RX/DR IN RCRD: CPT | Performed by: INTERNAL MEDICINE

## 2024-09-17 PROCEDURE — G2211 COMPLEX E/M VISIT ADD ON: HCPCS | Performed by: INTERNAL MEDICINE

## 2024-09-17 PROCEDURE — 3078F DIAST BP <80 MM HG: CPT | Performed by: INTERNAL MEDICINE

## 2024-09-17 PROCEDURE — 1159F MED LIST DOCD IN RCRD: CPT | Performed by: INTERNAL MEDICINE

## 2024-09-17 PROCEDURE — 3074F SYST BP LT 130 MM HG: CPT | Performed by: INTERNAL MEDICINE

## 2024-09-17 PROCEDURE — 99214 OFFICE O/P EST MOD 30 MIN: CPT | Performed by: INTERNAL MEDICINE

## 2024-09-17 PROCEDURE — 1126F AMNT PAIN NOTED NONE PRSNT: CPT | Performed by: INTERNAL MEDICINE

## 2024-10-24 ENCOUNTER — TELEPHONE (OUTPATIENT)
Dept: INTERNAL MEDICINE | Facility: CLINIC | Age: 79
End: 2024-10-24
Payer: MEDICARE

## 2024-10-24 RX ORDER — CETIRIZINE HYDROCHLORIDE 5 MG/1
5 TABLET ORAL
Qty: 30 TABLET | Refills: 1 | Status: SHIPPED | OUTPATIENT
Start: 2024-10-24 | End: 2024-12-23

## 2024-10-24 RX ORDER — FLUTICASONE PROPIONATE 50 UG/1
2 SPRAY, METERED NASAL DAILY
Qty: 48 G | Refills: 0 | Status: SHIPPED | OUTPATIENT
Start: 2024-10-24 | End: 2025-01-22

## 2024-10-24 NOTE — TELEPHONE ENCOUNTER
Viral allergy and will give meds will likely last couple weeks and antibiotics not going to help at this point

## 2024-10-24 NOTE — TELEPHONE ENCOUNTER
"Caller: Sandy Brownlee \"Agueda\"    Relationship: Self    Best call back number:  [phone]    What medication are you requesting: DRS DISCRETION    What are your current symptoms: DRAINAGE FROM NOSTRIL THAT IS CLEAR, CONSTANTLY DRIPPING BUT SHE HAS NO PAIN WITH IT    How long have you been experiencing symptoms: ONE WEEK    Have you had these symptoms before:    [x] Yes  [] No    Have you been treated for these symptoms before:   [x] Yes  [] No    If a prescription is needed, what is your preferred pharmacy and phone number: [id number] - [place], [place] - [address] AT [facility] [address] [facility] [phone] [facility] [phone] FX     Additional notes:PLEASE CALL TO ADVISE            "

## 2024-11-08 ENCOUNTER — OFFICE VISIT (OUTPATIENT)
Dept: INTERNAL MEDICINE | Facility: CLINIC | Age: 79
End: 2024-11-08
Payer: MEDICARE

## 2024-11-08 VITALS
HEART RATE: 68 BPM | WEIGHT: 144 LBS | TEMPERATURE: 98.2 F | SYSTOLIC BLOOD PRESSURE: 92 MMHG | HEIGHT: 67 IN | DIASTOLIC BLOOD PRESSURE: 48 MMHG | BODY MASS INDEX: 22.6 KG/M2

## 2024-11-08 DIAGNOSIS — K58.2 IRRITABLE BOWEL SYNDROME WITH BOTH CONSTIPATION AND DIARRHEA: ICD-10-CM

## 2024-11-08 DIAGNOSIS — R41.3 MEMORY LOSS: ICD-10-CM

## 2024-11-08 DIAGNOSIS — I10 BENIGN ESSENTIAL HYPERTENSION: Primary | ICD-10-CM

## 2024-11-08 NOTE — PROGRESS NOTES
Chief Complaint   Patient presents with    Memory Loss       History of Present Illness  78 y.o. female presents for follow up on memory loss and blood sugar and thyroid.     Review of Systems   Constitutional:  Negative for chills and fever.   Respiratory:  Negative for cough and shortness of breath.    Cardiovascular:  Negative for chest pain and palpitations.   Gastrointestinal:  Negative for abdominal pain, nausea and vomiting.   Skin:  Negative for rash.   Neurological:  Negative for dizziness, light-headedness and headaches.   Psychiatric/Behavioral:  Positive for decreased concentration. Negative for dysphoric mood.    All other systems reviewed and are negative.    .    PMSFH:  The following portions of the patient's history were reviewed and updated as appropriate: allergies, current medications, past family history, past medical history, past social history, past surgical history and problem list.      Current Outpatient Medications:     atorvastatin (LIPITOR) 10 MG tablet, TAKE 1 TABLET BY MOUTH EVERY NIGHT AT BEDTIME, Disp: 90 tablet, Rfl: 1    cetirizine (zyrTEC) 5 MG tablet, Take 1 tablet by mouth every night at bedtime for 60 days., Disp: 30 tablet, Rfl: 1    cholecalciferol (VITAMIN D3) 250 MCG (69782 UT) capsule, Take 1 capsule by mouth Daily., Disp: , Rfl:     Continuous Glucose Sensor (Dexcom G7 Sensor) misc, Use 1 Device Every 10 (Ten) Days., Disp: 1 each, Rfl: 0    fluticasone (FLONASE) 50 MCG/ACT nasal spray, Administer 2 sprays into the nostril(s) as directed by provider Daily for 90 days., Disp: 48 g, Rfl: 0    ibuprofen (ADVIL,MOTRIN) 600 MG tablet, Take 1 tablet by mouth Every 6 (Six) Hours As Needed for Mild Pain., Disp: 20 tablet, Rfl: 0    levothyroxine (SYNTHROID, LEVOTHROID) 50 MCG tablet, TAKE 1 TABLET BY MOUTH DAILY, Disp: 30 tablet, Rfl: 2    lisinopril-hydrochlorothiazide (PRINZIDE,ZESTORETIC) 20-12.5 MG per tablet, TAKE 1 TABLET BY MOUTH DAILY, Disp: 90 tablet, Rfl: 1     "multivitamin with minerals tablet tablet, Take 1 tablet by mouth Daily., Disp: , Rfl:     ondansetron ODT (ZOFRAN-ODT) 4 MG disintegrating tablet, Place 1 tablet on the tongue 4 (Four) Times a Day As Needed for Nausea or Vomiting., Disp: 12 tablet, Rfl: 0    Polyethylene Glycol 3350 (MIRALAX PO), Take  by mouth., Disp: , Rfl:     psyllium (KONSYL) 28.3 % pack pack, Take 1 packet by mouth Daily., Disp: , Rfl:     VITALS:  BP 92/48   Pulse 68   Temp 98.2 °F (36.8 °C)   Ht 170.2 cm (67.01\")   Wt 65.3 kg (144 lb)   BMI 22.55 kg/m²     Physical Exam  Vitals and nursing note reviewed.   Constitutional:       Appearance: Normal appearance. She is well-developed.   HENT:      Head: Normocephalic.   Eyes:      Extraocular Movements: Extraocular movements intact.      Conjunctiva/sclera: Conjunctivae normal.   Cardiovascular:      Rate and Rhythm: Normal rate and regular rhythm.      Heart sounds: Normal heart sounds.   Pulmonary:      Effort: Pulmonary effort is normal. No respiratory distress.      Breath sounds: Normal breath sounds.   Abdominal:      General: Bowel sounds are normal.      Palpations: Abdomen is soft.      Tenderness: There is no abdominal tenderness.   Skin:     General: Skin is warm and dry.   Neurological:      General: No focal deficit present.      Mental Status: She is alert and oriented to person, place, and time.   Psychiatric:         Mood and Affect: Mood normal.         Behavior: Behavior normal.         LABS:    CMP:  Lab Results   Component Value Date    BUN 14 08/27/2024    CREATININE 0.84 08/27/2024    EGFRIFNONA 78 12/14/2021     08/27/2024    K 4.2 08/27/2024     08/27/2024    CALCIUM 10.1 08/27/2024    ALBUMIN 4.5 08/27/2024    BILITOT 0.5 08/27/2024    ALKPHOS 79 08/27/2024    AST 20 08/27/2024    ALT 14 08/27/2024     CBC:  Lab Results   Component Value Date    WBC 4.85 08/27/2024    RBC 4.02 08/27/2024    HGB 13.1 08/27/2024    HCT 38.2 08/27/2024    MCV 95.0 " 08/27/2024    MCH 32.6 08/27/2024    MCHC 34.3 08/27/2024    RDW 11.7 (L) 08/27/2024     08/27/2024     LIPID PANEL:  Lab Results   Component Value Date    TRIG 72 05/10/2024    HDL 91 (H) 05/10/2024    VLDL 13 05/10/2024    LDL 70 05/10/2024    LDLHDL 0.75 05/10/2024     HGBA1C (LAST 3):  Lab Results   Component Value Date    HGBA1C 5.30 05/10/2024    HGBA1C 5.30 01/05/2024    HGBA1C 5.90 (H) 04/25/2023     Procedures         ASSESSMENT/PLAN:  Diagnoses and all orders for this visit:    1. Benign essential hypertension (Primary)  Assessment & Plan:  Hypertension is stable and controlled  Continue current treatment regimen.  Blood pressure will be reassessed in 6 months.      2. Memory loss  Assessment & Plan:  Encouraged to do weight resistance and walking and stretching and consider adding Recode ( End Alzhmeirs)  protocol per Dr Awad and consider adding Red light therapy and or Infrared sauna.       3. Irritable bowel syndrome with both constipation and diarrhea  Assessment & Plan:  Much improved           FOLLOW-UP:  Return in about 6 months (around 5/8/2025) for Medicare Wellness.      Electronically signed by:    Sylvester Roman MD

## 2024-11-08 NOTE — ASSESSMENT & PLAN NOTE
Encouraged to do weight resistance and walking and stretching and consider adding Recode ( End Alzhmeirs)  protocol per Dr Awad and consider adding Red light therapy and or Infrared sauna.

## 2024-11-18 RX ORDER — ATORVASTATIN CALCIUM 10 MG/1
10 TABLET, FILM COATED ORAL
Qty: 90 TABLET | Refills: 1 | Status: SHIPPED | OUTPATIENT
Start: 2024-11-18

## 2024-11-18 RX ORDER — LEVOTHYROXINE SODIUM 25 UG/1
25 TABLET ORAL
Qty: 90 TABLET | Refills: 1 | OUTPATIENT
Start: 2024-11-18

## 2024-11-26 ENCOUNTER — OFFICE VISIT (OUTPATIENT)
Dept: INTERNAL MEDICINE | Facility: CLINIC | Age: 79
End: 2024-11-26
Payer: MEDICARE

## 2024-11-26 ENCOUNTER — HOSPITAL ENCOUNTER (OUTPATIENT)
Dept: CARDIOLOGY | Facility: HOSPITAL | Age: 79
Discharge: HOME | End: 2024-11-26
Admitting: INTERNAL MEDICINE
Payer: MEDICARE

## 2024-11-26 VITALS
OXYGEN SATURATION: 97 % | TEMPERATURE: 98 F | HEIGHT: 67 IN | DIASTOLIC BLOOD PRESSURE: 80 MMHG | WEIGHT: 138 LBS | BODY MASS INDEX: 21.66 KG/M2 | HEART RATE: 75 BPM | SYSTOLIC BLOOD PRESSURE: 120 MMHG

## 2024-11-26 VITALS — HEIGHT: 67 IN | WEIGHT: 144 LBS | BODY MASS INDEX: 22.6 KG/M2

## 2024-11-26 DIAGNOSIS — E78.5 HYPERLIPIDEMIA LDL GOAL <100: ICD-10-CM

## 2024-11-26 DIAGNOSIS — L03.90 WOUND CELLULITIS: Primary | ICD-10-CM

## 2024-11-26 DIAGNOSIS — R42 DIZZINESS: ICD-10-CM

## 2024-11-26 DIAGNOSIS — M25.551 PAIN OF BOTH HIP JOINTS: ICD-10-CM

## 2024-11-26 DIAGNOSIS — R41.3 MEMORY LOSS: ICD-10-CM

## 2024-11-26 DIAGNOSIS — M25.552 PAIN OF BOTH HIP JOINTS: ICD-10-CM

## 2024-11-26 DIAGNOSIS — R53.83 OTHER FATIGUE: ICD-10-CM

## 2024-11-26 PROBLEM — L03.115 CELLULITIS OF RIGHT LOWER EXTREMITY: Status: ACTIVE | Noted: 2024-11-26

## 2024-11-26 LAB
BH CV XLRA MEAS LEFT DIST CCA EDV: 17.3 CM/SEC
BH CV XLRA MEAS LEFT DIST CCA PSV: 74 CM/SEC
BH CV XLRA MEAS LEFT DIST ICA EDV: 38.3 CM/SEC
BH CV XLRA MEAS LEFT DIST ICA PSV: 102.6 CM/SEC
BH CV XLRA MEAS LEFT ICA/CCA RATIO: 1.03
BH CV XLRA MEAS LEFT MID CCA EDV: 16 CM/SEC
BH CV XLRA MEAS LEFT MID CCA PSV: 77 CM/SEC
BH CV XLRA MEAS LEFT MID ICA EDV: 25.3 CM/SEC
BH CV XLRA MEAS LEFT MID ICA PSV: 79.2 CM/SEC
BH CV XLRA MEAS LEFT PROX CCA EDV: 15.2 CM/SEC
BH CV XLRA MEAS LEFT PROX CCA PSV: 87.4 CM/SEC
BH CV XLRA MEAS LEFT PROX ECA EDV: 11.7 CM/SEC
BH CV XLRA MEAS LEFT PROX ECA PSV: 70.5 CM/SEC
BH CV XLRA MEAS LEFT PROX ICA EDV: 14.3 CM/SEC
BH CV XLRA MEAS LEFT PROX ICA PSV: 48.4 CM/SEC
BH CV XLRA MEAS LEFT PROX SCLA PSV: 170 CM/SEC
BH CV XLRA MEAS LEFT VERTEBRAL A PSV: 64.9 CM/SEC
BH CV XLRA MEAS RIGHT DIST CCA EDV: 14.6 CM/SEC
BH CV XLRA MEAS RIGHT DIST CCA PSV: 65.6 CM/SEC
BH CV XLRA MEAS RIGHT DIST ICA EDV: 31.8 CM/SEC
BH CV XLRA MEAS RIGHT DIST ICA PSV: 107.1 CM/SEC
BH CV XLRA MEAS RIGHT ICA/CCA RATIO: 0.97
BH CV XLRA MEAS RIGHT MID CCA EDV: 10.4 CM/SEC
BH CV XLRA MEAS RIGHT MID CCA PSV: 74 CM/SEC
BH CV XLRA MEAS RIGHT MID ICA EDV: 21.1 CM/SEC
BH CV XLRA MEAS RIGHT MID ICA PSV: 72.1 CM/SEC
BH CV XLRA MEAS RIGHT PROX CCA EDV: 10.5 CM/SEC
BH CV XLRA MEAS RIGHT PROX CCA PSV: 70.4 CM/SEC
BH CV XLRA MEAS RIGHT PROX ECA EDV: 8.8 CM/SEC
BH CV XLRA MEAS RIGHT PROX ECA PSV: 63.6 CM/SEC
BH CV XLRA MEAS RIGHT PROX ICA EDV: 11.7 CM/SEC
BH CV XLRA MEAS RIGHT PROX ICA PSV: 56.5 CM/SEC
BH CV XLRA MEAS RIGHT PROX SCLA PSV: 89 CM/SEC
BH CV XLRA MEAS RIGHT VERTEBRAL A PSV: 53.2 CM/SEC
LEFT ARM BP: NORMAL MMHG
RIGHT ARM BP: NORMAL MMHG

## 2024-11-26 PROCEDURE — 3074F SYST BP LT 130 MM HG: CPT | Performed by: NURSE PRACTITIONER

## 2024-11-26 PROCEDURE — 1126F AMNT PAIN NOTED NONE PRSNT: CPT | Performed by: NURSE PRACTITIONER

## 2024-11-26 PROCEDURE — 99213 OFFICE O/P EST LOW 20 MIN: CPT | Performed by: NURSE PRACTITIONER

## 2024-11-26 PROCEDURE — 93880 EXTRACRANIAL BILAT STUDY: CPT

## 2024-11-26 PROCEDURE — 3079F DIAST BP 80-89 MM HG: CPT | Performed by: NURSE PRACTITIONER

## 2024-11-26 RX ORDER — DONEPEZIL HYDROCHLORIDE 10 MG/1
10 TABLET, FILM COATED ORAL NIGHTLY
COMMUNITY
Start: 2024-11-17

## 2024-11-26 RX ORDER — CLINDAMYCIN HYDROCHLORIDE 300 MG/1
300 CAPSULE ORAL 4 TIMES DAILY
Qty: 40 CAPSULE | Refills: 0 | Status: SHIPPED | OUTPATIENT
Start: 2024-11-26

## 2024-11-26 RX ORDER — KETOCONAZOLE 20 MG/ML
SHAMPOO, SUSPENSION TOPICAL AS NEEDED
COMMUNITY
Start: 2024-11-11

## 2024-11-26 NOTE — ASSESSMENT & PLAN NOTE
Provided education on importance of taking antibotic as prescribed.     Provided Medi-honey for wound, daily x 7 days. Keep covered with 2x2.

## 2024-11-26 NOTE — PROGRESS NOTES
Office Note     Name: Sandy Brownlee    : 1945     MRN: [id number]     Chief Complaint  Wound Infection (R lower leg)    Subjective     History of Present Illness:  Sandy Brownlee is a 78 y.o. female who presents today for wound on lower left leg that has redness surrounding the wound and drainage from Derm visit removing a concerning area.   She kept a bandaid on it for a few days and it got worse.     Denies fever or pain.     Past Medical History:   Past Medical History:   Diagnosis Date    Acute respiratory failure with hypoxia 2022 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 2022    Skin lesion of right leg 2017    precancerous    Vertigo 2004     2nd HTN       Past Surgical History:   Past Surgical History:   Procedure Laterality Date    APPENDECTOMY      COLONOSCOPY      HYSTERECTOMY  1986    SKIN CANCER EXCISION      Right arm Dr Partida       Family History:   Family History   Problem Relation Age of Onset    Hyperlipidemia Mother     Hypertension Mother     Diabetes Maternal Aunt     Obesity Maternal Aunt     Colon cancer Neg Hx        Social History:   Social History     Socioeconomic History    Marital status:      Spouse name: Vince    Number of children: 2    Years of education: 16    Highest education level: Some college, no degree   Tobacco Use    Smoking status: Never    Smokeless tobacco: Never   Vaping Use    Vaping status: Never Used   Substance and Sexual Activity    Alcohol use: Yes     Alcohol/week: 2.0 standard drinks of alcohol     Types: 2 Glasses of wine per week     Comment: nightly     Drug use: Never    Sexual activity: Yes     Partners: Male     Birth control/protection: None       Immunizations:   Immunization History   Administered Date(s) Administered    ABRYSVO (RSV, 60+ or pregnant women 32-36 wks) 10/06/2023    COVID-19  (MODERNA) 12YRS+ (SPIKEVAX) 09/10/2024    COVID-19 (MODERNA) 1st,2nd,3rd Dose Monovalent 01/14/2021, 02/11/2021, 08/15/2021    COVID-19 (UNSPECIFIED) 10/01/2023    FLUAD TRI 65YR+ 09/15/2020, 10/06/2023, 09/10/2024    Fluzone (or Fluarix & Flulaval for VFC) >6mos 09/15/2021    Hepatitis A 11/02/2018    Pneumococcal Conjugate 13-Valent (PCV13) 06/18/2015    Pneumococcal Polysaccharide (PPSV23) 11/01/2009, 11/01/2019    Shingrix 08/20/2019, 07/26/2022    Tdap 03/22/2011    Zostavax 01/28/2014    flucelvax quad pfs =>4 YRS 09/24/2019        Medications:     Current Outpatient Medications:     atorvastatin (LIPITOR) 10 MG tablet, TAKE 1 TABLET BY MOUTH EVERY NIGHT AT BEDTIME, Disp: 90 tablet, Rfl: 1    cetirizine (zyrTEC) 5 MG tablet, Take 1 tablet by mouth every night at bedtime for 60 days., Disp: 30 tablet, Rfl: 1    cholecalciferol (VITAMIN D3) 250 MCG (56032 UT) capsule, Take 1 capsule by mouth Daily., Disp: , Rfl:     Continuous Glucose Sensor (Dexcom G7 Sensor) misc, Use 1 Device Every 10 (Ten) Days., Disp: 1 each, Rfl: 0    donepezil (ARICEPT) 10 MG tablet, Take 1 tablet by mouth Every Night., Disp: , Rfl:     ibuprofen (ADVIL,MOTRIN) 600 MG tablet, Take 1 tablet by mouth Every 6 (Six) Hours As Needed for Mild Pain., Disp: 20 tablet, Rfl: 0    ketoconazole (NIZORAL) 2 % shampoo, Apply  topically to the appropriate area as directed As Needed for Dandruff or Irritation., Disp: , Rfl:     levothyroxine (SYNTHROID, LEVOTHROID) 50 MCG tablet, TAKE 1 TABLET BY MOUTH DAILY, Disp: 30 tablet, Rfl: 2    lisinopril-hydrochlorothiazide (PRINZIDE,ZESTORETIC) 20-12.5 MG per tablet, TAKE 1 TABLET BY MOUTH DAILY, Disp: 90 tablet, Rfl: 1    multivitamin with minerals tablet tablet, Take 1 tablet by mouth Daily., Disp: , Rfl:     ondansetron ODT (ZOFRAN-ODT) 4 MG disintegrating tablet, Place 1 tablet on the tongue 4 (Four) Times a Day As Needed for Nausea or Vomiting., Disp: 12 tablet, Rfl: 0    Polyethylene Glycol 3350 (MIRALAX  "PO), Take  by mouth., Disp: , Rfl:     psyllium (KONSYL) 28.3 % pack pack, Take 1 packet by mouth Daily., Disp: , Rfl:     clindamycin (Cleocin) 300 MG capsule, Take 1 capsule by mouth 4 (Four) Times a Day., Disp: 40 capsule, Rfl: 0    fluticasone (FLONASE) 50 MCG/ACT nasal spray, Administer 2 sprays into the nostril(s) as directed by provider Daily for 90 days. (Patient not taking: Reported on 11/26/2024), Disp: 48 g, Rfl: 0    Allergies:   Allergies   Allergen Reactions    Hydrocodone GI Intolerance    Codeine Nausea Only    Morphine Nausea Only       Objective     Vital Signs  /80 (BP Location: Left arm, Patient Position: Sitting, Cuff Size: Adult)   Pulse 75   Temp 98 °F (36.7 °C) (Infrared)   Ht 170.2 cm (67.01\")   Wt 62.6 kg (138 lb)   SpO2 97%   BMI 21.61 kg/m²   Estimated body mass index is 21.61 kg/m² as calculated from the following:    Height as of this encounter: 170.2 cm (67.01\").    Weight as of this encounter: 62.6 kg (138 lb).    BMI is within normal parameters. No other follow-up for BMI required.       Physical Exam  Skin:     General: Skin is warm.             Comments: Wound present, drainage.   Erythema and warmth around wound.           Assessment and Plan     Assessment/Plan:  Diagnoses and all orders for this visit:    1. Wound cellulitis (Primary)  Assessment & Plan:  Provided education on importance of taking antibotic as prescribed.     Provided Medi-honey for wound, daily x 7 days. Keep covered with 2x2.     Orders:  -     clindamycin (Cleocin) 300 MG capsule; Take 1 capsule by mouth 4 (Four) Times a Day.  Dispense: 40 capsule; Refill: 0    2. Memory loss    3. Pain of both hip joints        Follow Up  No follow-ups on file.    JOEL Garcia   [facility] [address]  "

## 2024-12-04 ENCOUNTER — OFFICE VISIT (OUTPATIENT)
Dept: INTERNAL MEDICINE | Facility: CLINIC | Age: 79
End: 2024-12-04
Payer: MEDICARE

## 2024-12-04 VITALS
HEIGHT: 67 IN | SYSTOLIC BLOOD PRESSURE: 138 MMHG | BODY MASS INDEX: 23.54 KG/M2 | WEIGHT: 150 LBS | HEART RATE: 76 BPM | DIASTOLIC BLOOD PRESSURE: 70 MMHG | TEMPERATURE: 97.1 F

## 2024-12-04 DIAGNOSIS — I10 BENIGN ESSENTIAL HYPERTENSION: Chronic | ICD-10-CM

## 2024-12-04 DIAGNOSIS — L03.90 WOUND CELLULITIS: Primary | ICD-10-CM

## 2024-12-04 PROCEDURE — 1159F MED LIST DOCD IN RCRD: CPT | Performed by: STUDENT IN AN ORGANIZED HEALTH CARE EDUCATION/TRAINING PROGRAM

## 2024-12-04 PROCEDURE — 99214 OFFICE O/P EST MOD 30 MIN: CPT | Performed by: STUDENT IN AN ORGANIZED HEALTH CARE EDUCATION/TRAINING PROGRAM

## 2024-12-04 PROCEDURE — 1160F RVW MEDS BY RX/DR IN RCRD: CPT | Performed by: STUDENT IN AN ORGANIZED HEALTH CARE EDUCATION/TRAINING PROGRAM

## 2024-12-04 PROCEDURE — 3078F DIAST BP <80 MM HG: CPT | Performed by: STUDENT IN AN ORGANIZED HEALTH CARE EDUCATION/TRAINING PROGRAM

## 2024-12-04 PROCEDURE — 1126F AMNT PAIN NOTED NONE PRSNT: CPT | Performed by: STUDENT IN AN ORGANIZED HEALTH CARE EDUCATION/TRAINING PROGRAM

## 2024-12-04 PROCEDURE — 3075F SYST BP GE 130 - 139MM HG: CPT | Performed by: STUDENT IN AN ORGANIZED HEALTH CARE EDUCATION/TRAINING PROGRAM

## 2024-12-04 RX ORDER — MUPIROCIN 20 MG/G
1 OINTMENT TOPICAL 3 TIMES DAILY
Qty: 30 G | Refills: 0 | Status: SHIPPED | OUTPATIENT
Start: 2024-12-04

## 2024-12-04 NOTE — PROGRESS NOTES
"Chief Complaint  Sandy Brownlee is a 78 y.o. female presenting for Wound Check.     Patient has a past medical history of hypertension, hyperlipidemia, heart murmur, PVD, hypothyroidism, impaired fasting glucose, vitamin D deficiency, IBS-D, SCC of skin, cellulitis, weakness, memory loss, respiratory failure and pneumonia.    History of Present Illness  Patient is here for evaluation of her right leg wound after punch biopsy.  She states it has gotten a lot better, she has been taking the antibiotic and also using the honey ointment.  She is traveling to [facility] next week, will be back in April.  She has PCP down there for any needs.    Of note she forgot to take her blood pressure medication this morning.    The following portions of the patient's history were reviewed and updated as appropriate: allergies, current medications, past family history, past medical history, past social history, past surgical history, and problem list.    Image captured per patient verbal consent:          Objective  /70 (BP Location: Left arm, Patient Position: Sitting, Cuff Size: Adult)   Pulse 76   Temp 97.1 °F (36.2 °C) (Temporal)   Ht 170.2 cm (67.01\")   Wt 68 kg (150 lb)   BMI 23.49 kg/m²     Physical Exam  Constitutional:       Appearance: Normal appearance.   HENT:      Head: Normocephalic and atraumatic.   Eyes:      Extraocular Movements: Extraocular movements intact.      Conjunctiva/sclera: Conjunctivae normal.   Pulmonary:      Effort: Pulmonary effort is normal. No respiratory distress.   Musculoskeletal:      Cervical back: Normal range of motion and neck supple.   Skin:     General: Skin is warm and dry.          Neurological:      Mental Status: She is alert and oriented to person, place, and time. Mental status is at baseline.   Psychiatric:         Behavior: Behavior normal.         Thought Content: Thought content normal.         Assessment/Plan   1. Wound cellulitis  It appears to be resolving.  " "I we will prescribe her Bactroban that she can start using if there would be any recurrence of redness.  Otherwise I would follow-up with PCP in [place] if this flares up.  Complete antibiotic as ordered.  - mupirocin (BACTROBAN) 2 % ointment; Apply 1 Application topically to the appropriate area as directed 3 (Three) Times a Day.  Dispense: 30 g; Refill: 0    2. Benign essential hypertension  Vitals:    12/04/24 1025 12/04/24 1114   BP: 142/70 138/70   BP Location: Left arm Left arm   Patient Position: Sitting Sitting   Cuff Size: Adult Adult   Pulse: 76    Temp: 97.1 °F (36.2 °C)    TempSrc: Temporal    Weight: 68 kg (150 lb)    Height: 170.2 cm (67.01\")    Initial blood pressure elevated, repeat improved, however not at goal.  Recommend taking her blood pressure medication, otherwise would not make any changes due to missing dose this morning.      No follow-ups on file.    Future Appointments         Provider Department Center    5/12/2025 9:45 AM Sylvester Roman MD [facility] INTERNAL MEDICINE [place]              Jackson Rosenthal MD  Family Medicine  12/04/2024  "

## 2024-12-21 DIAGNOSIS — E03.9 ACQUIRED HYPOTHYROIDISM: ICD-10-CM

## 2024-12-23 RX ORDER — LEVOTHYROXINE SODIUM 50 UG/1
50 TABLET ORAL DAILY
Qty: 30 TABLET | Refills: 2 | Status: SHIPPED | OUTPATIENT
Start: 2024-12-23

## 2025-01-10 ENCOUNTER — TELEPHONE (OUTPATIENT)
Dept: INTERNAL MEDICINE | Facility: CLINIC | Age: 80
End: 2025-01-10
Payer: MEDICARE

## 2025-01-10 DIAGNOSIS — R42 DIZZINESS: ICD-10-CM

## 2025-01-10 DIAGNOSIS — E16.2 HYPOGLYCEMIA: ICD-10-CM

## 2025-01-10 RX ORDER — ACYCLOVIR 400 MG/1
1 TABLET ORAL
Qty: 3 EACH | Refills: 2 | Status: SHIPPED | OUTPATIENT
Start: 2025-01-10

## 2025-01-10 NOTE — TELEPHONE ENCOUNTER
"  Caller: Sandy Brownlee \"Agueda\"    Relationship: Self    Best call back number: [phone]     What is the best time to reach you: BEFORE  1PM OR AFTER 3 PM    Who are you requesting to speak with (clinical staff, provider,  specific staff member): DR. ALBARADO'S NURSE        What was the call regarding: PATIENT WOULD LIKE A CALL BACK TO DISCUSS SOME ONGOING MEDICAL ISSUES.         "

## 2025-01-10 NOTE — TELEPHONE ENCOUNTER
Pt states in the morning she feels ok, but by 10am/12pm, she can tell her sugar is starting to bottom out.  At the moment she has no way to check it.  She is wanting to know if you can send her a wearable device to the [facility] in [place], [place].  Please advise.

## 2025-01-13 ENCOUNTER — TELEPHONE (OUTPATIENT)
Dept: INTERNAL MEDICINE | Facility: CLINIC | Age: 80
End: 2025-01-13
Payer: MEDICARE

## 2025-01-13 NOTE — TELEPHONE ENCOUNTER
I spoke to patient she stated she did go to [facility] and they listened to her heart and lungs and they were ok. They ordered labs so she is going to get that done also.

## 2025-01-13 NOTE — TELEPHONE ENCOUNTER
Patient is calling in today, states that she is currently in [facility].  And this past week that she has been feeling shaky, lightheaded and feels like she is going to  pass out.  Says that has recently checked her sugar levels and they are good.  Doesn't know what's going on.  Would like someone to call her back asap.  Says it continues to get worse

## 2025-01-17 RX ORDER — FLUTICASONE PROPIONATE 50 MCG
SPRAY, SUSPENSION (ML) NASAL
Qty: 48 G | Refills: 0 | Status: SHIPPED | OUTPATIENT
Start: 2025-01-17

## 2025-01-17 NOTE — TELEPHONE ENCOUNTER
Rx Refill Note  Requested Prescriptions     Pending Prescriptions Disp Refills    fluticasone (FLONASE) 50 MCG/ACT nasal spray [Pharmacy Med Name: FLUTICASONE 50MCG NASAL SP (120) RX] 48 g 0     Sig: ADMINISTER 2 SPRAYS IN EACH NOSTRIL EVERY DAY      Last office visit with prescribing clinician: 11/8/2024   Last telemedicine visit with prescribing clinician: Visit date not found   Next office visit with prescribing clinician: 5/12/2025                         Would you like a call back once the refill request has been completed: [] Yes [] No    If the office needs to give you a call back, can they leave a voicemail: [] Yes [] No    Ruthy Plunkett MA  01/17/25, 11:04 EST

## 2025-01-24 ENCOUNTER — TELEPHONE (OUTPATIENT)
Dept: INTERNAL MEDICINE | Facility: CLINIC | Age: 80
End: 2025-01-24
Payer: MEDICARE

## 2025-01-24 NOTE — TELEPHONE ENCOUNTER
Patient called in saying that when she breathes out she is wheezing and she is unsure of why or what she should do.  She is still in [place] and she would like a call back about this issue.

## 2025-02-10 ENCOUNTER — TELEPHONE (OUTPATIENT)
Dept: INTERNAL MEDICINE | Facility: CLINIC | Age: 80
End: 2025-02-10
Payer: MEDICARE

## 2025-02-10 RX ORDER — LISINOPRIL AND HYDROCHLOROTHIAZIDE 12.5; 2 MG/1; MG/1
1 TABLET ORAL DAILY
Qty: 90 TABLET | Refills: 1 | Status: SHIPPED | OUTPATIENT
Start: 2025-02-10 | End: 2025-02-11 | Stop reason: SDUPTHER

## 2025-02-10 NOTE — TELEPHONE ENCOUNTER
She can take OTC cough medications as needed however, I would advise her to go to urgent treatment for an assessment while out of the state.

## 2025-02-10 NOTE — TELEPHONE ENCOUNTER
Rx Refill Note  Requested Prescriptions     Pending Prescriptions Disp Refills    lisinopril-hydrochlorothiazide (PRINZIDE,ZESTORETIC) 20-12.5 MG per tablet [Pharmacy Med Name: LISINOPRIL-HCTZ 20/12.5MG TABLETS] 90 tablet 1     Sig: TAKE 1 TABLET BY MOUTH DAILY      Last office visit with prescribing clinician: 11/8/2024   Last telemedicine visit with prescribing clinician: Visit date not found   Next office visit with prescribing clinician: 2/9/2025                         Would you like a call back once the refill request has been completed: [] Yes [] No    If the office needs to give you a call back, can they leave a voicemail: [] Yes [] No    Ruthy Plunkett MA  02/10/25, 09:02 EST

## 2025-02-10 NOTE — TELEPHONE ENCOUNTER
"Caller: Sandy Brownlee \"Agueda\"    Relationship to patient: Self    Best call back number: [phone]     PATIENT IS CALLING TO STATE THAT SHE IS IN [place] AND HAS A BAD COUGH AND WANTED TO KNOW WHAT MEDICATION SHE NEEDS TO BE ON?      "

## 2025-02-11 ENCOUNTER — TELEPHONE (OUTPATIENT)
Dept: INTERNAL MEDICINE | Facility: CLINIC | Age: 80
End: 2025-02-11
Payer: MEDICARE

## 2025-02-11 RX ORDER — LISINOPRIL AND HYDROCHLOROTHIAZIDE 12.5; 2 MG/1; MG/1
1 TABLET ORAL DAILY
Qty: 90 TABLET | Refills: 1 | Status: SHIPPED | OUTPATIENT
Start: 2025-02-11

## 2025-02-11 NOTE — TELEPHONE ENCOUNTER
"  Caller: Sandy Brownlee \"Agueda\"    Relationship to patient: Self    Best call back number: [phone]     Patient is needing: lisinopril-hydrochlorothiazide (PRINZIDE,ZESTORETIC) 20-12.5 MG per tablet [13831] (Order [id number])     PLEASE SEND TO     [facility] #[id number] - [place], [place] - [address] AT [facility] OF  [address] [facility] [phone] [facility] [phone] FX   "

## 2025-03-18 DIAGNOSIS — E03.9 ACQUIRED HYPOTHYROIDISM: ICD-10-CM

## 2025-03-18 RX ORDER — LEVOTHYROXINE SODIUM 50 UG/1
50 TABLET ORAL DAILY
Qty: 30 TABLET | Refills: 2 | Status: SHIPPED | OUTPATIENT
Start: 2025-03-18

## 2025-04-02 RX ORDER — ATORVASTATIN CALCIUM 10 MG/1
10 TABLET, FILM COATED ORAL
Qty: 90 TABLET | Refills: 1 | Status: SHIPPED | OUTPATIENT
Start: 2025-04-02

## 2025-04-15 RX ORDER — ATORVASTATIN CALCIUM 10 MG/1
10 TABLET, FILM COATED ORAL
Qty: 90 TABLET | Refills: 1 | OUTPATIENT
Start: 2025-04-15

## 2025-04-15 NOTE — TELEPHONE ENCOUNTER
"    Caller: Sandy Brownlee \"Agueda\"    Relationship: Self    Best call back number:   Telephone Information:   Mobile [phone]       Requested Prescriptions:   Requested Prescriptions     Pending Prescriptions Disp Refills    atorvastatin (LIPITOR) 10 MG tablet 90 tablet 1     Sig: Take 1 tablet by mouth every night at bedtime.        Pharmacy where request should be sent: [facility] #[id number] [address] AT [facility] [address] [facility] [phone] [facility] [phone]      Last office visit with prescribing clinician: 11/8/2024   Last telemedicine visit with prescribing clinician: Visit date not found   Next office visit with prescribing clinician: 5/12/2025     Additional details provided by patient: PATIENT WAS IN [place] FOR THE LAST SEVERAL MONTHS AND PATIENT STATES THE BAG HER ATORVASTATIN WAS IN WAS NOT IN HER BELONGINGS WHEN SHE LANDED TO COME HOME. PATIENT IS OUT OF THEIR MEDICATION DUE TO IT GOING MISSING IN FLIGHT.    Does the patient have less than a 3 day supply:  [x] Yes  [] No      Ml Cat Rep   04/15/25 11:31 EDT     "

## 2025-05-15 ENCOUNTER — TELEPHONE (OUTPATIENT)
Dept: ENDOCRINOLOGY | Facility: CLINIC | Age: 80
End: 2025-05-15

## 2025-05-15 NOTE — TELEPHONE ENCOUNTER
"Caller: Sandy Brownlee \"Agueda\"    Relationship: Self    Best call back number: [phone]     Who is your current provider: [facility]    Is your current provider offboarding? NO    Who would you like your new provider to be: FREDDIE    What are your reasons for transferring care: RECOMMENDATION OF PCP      "

## 2025-05-15 NOTE — TELEPHONE ENCOUNTER
Will fill out provider change request then reach out to pt once both providers sign off, if approved.

## 2025-05-22 ENCOUNTER — OFFICE VISIT (OUTPATIENT)
Age: 80
End: 2025-05-22
Payer: MEDICARE

## 2025-05-22 VITALS
HEART RATE: 83 BPM | BODY MASS INDEX: 25.43 KG/M2 | SYSTOLIC BLOOD PRESSURE: 118 MMHG | WEIGHT: 162 LBS | DIASTOLIC BLOOD PRESSURE: 72 MMHG | OXYGEN SATURATION: 98 % | HEIGHT: 67 IN

## 2025-05-22 DIAGNOSIS — R73.9 ELEVATED RANDOM BLOOD GLUCOSE LEVEL: Primary | ICD-10-CM

## 2025-05-22 LAB
EXPIRATION DATE: NORMAL
EXPIRATION DATE: NORMAL
GLUCOSE BLDC GLUCOMTR-MCNC: 127 MG/DL (ref 70–130)
HBA1C MFR BLD: 5.5 % (ref 4.5–5.7)
Lab: NORMAL
Lab: NORMAL

## 2025-05-22 RX ORDER — BLOOD-GLUCOSE METER
KIT MISCELLANEOUS
Qty: 200 EACH | Refills: 3 | Status: SHIPPED | OUTPATIENT
Start: 2025-05-22

## 2025-05-22 RX ORDER — ACARBOSE 25 MG/1
25 TABLET ORAL
Qty: 90 TABLET | Refills: 1 | Status: SHIPPED | OUTPATIENT
Start: 2025-05-22 | End: 2026-05-22

## 2025-05-22 NOTE — PROGRESS NOTES
"     Office Note      Date: 2025  Patient Name: Sandy Brownlee  MRN: [id number]  : 1945    Chief Complaint   Patient presents with    Hypothyroidism    Hypoglycemia       History of Present Illness:   Sandy Brownlee is a 79 y.o. female who presents for symptomatic perceived hypoglycemia. Patient hasn't checked blood glucose when she feels shaky, tired, and like she must eat something, usually an hour or two after she eats. Symptoms have been going on for a few months now and she treats symptoms with apples, soda, or other carbohydrates. No documented hypoglycemia. Only abnormal value in her record I found was glucose of 208 during a hospitalization in 2025. She reports she has gained twenty pounds over the last few months since eating more sugary foods.     accompanies patient to appointment today  Subjective     Review of Systems   Constitutional:  Positive for diaphoresis.   Endocrine: Positive for polyphagia.   Neurological:  Positive for weakness.           Diet and Exercise:  Meals per day: 3  Minutes of exercise per week: 150 mins.      The following portions of the patient's history were reviewed and updated as appropriate: allergies, current medications, past family history, past medical history, past social history, past surgical history, and problem list.    Objective     Visit Vitals  /72 (BP Location: Left arm, Patient Position: Sitting, Cuff Size: Adult)   Pulse 83   Ht 170.2 cm (67\")   Wt 73.5 kg (162 lb)   SpO2 98%   BMI 25.37 kg/m²     Glucose 208 at 3:20 AM on 2025 in the hospital on [facility]    Physical Exam:  Physical Exam  Vitals reviewed.   Constitutional:       Appearance: Normal appearance. She is normal weight.   HENT:      Head: Normocephalic and atraumatic.      Nose: Nose normal.   Eyes:      Conjunctiva/sclera: Conjunctivae normal.   Cardiovascular:      Rate and Rhythm: Normal rate.   Pulmonary:      Effort: Pulmonary effort is normal.   Skin:     " "General: Skin is warm and dry.   Neurological:      General: No focal deficit present.      Mental Status: She is alert and oriented to person, place, and time. Mental status is at baseline.   Psychiatric:         Mood and Affect: Mood normal.         Behavior: Behavior normal.         Thought Content: Thought content normal.         Judgment: Judgment normal.         Labs:  Glucose in office on 5/22/2025:127  HbA1c  Hemoglobin A1C   Date Value Ref Range Status   05/22/2025 5.5 4.5 - 5.7 % Final   05/10/2024 5.30 4.80 - 5.60 % Final   .    CMP  Lab Results   Component Value Date    GLUCOSE 113 (H) 08/27/2024    BUN 14 08/27/2024    CREATININE 0.84 08/27/2024    EGFRIFNONA 78 12/14/2021    BCR 16.7 08/27/2024    K 4.2 08/27/2024    CO2 25.4 08/27/2024    CALCIUM 10.1 08/27/2024    AST 20 08/27/2024    ALT 14 08/27/2024        Lipid Panel  Lab Results   Component Value Date    HDL Cholesterol 91 (H) 05/10/2024    LDL Cholesterol  70 05/10/2024    LDL/HDL Ratio 0.75 05/10/2024    Triglycerides 72 05/10/2024        TSH  Lab Results   Component Value Date    TSH 2.060 08/27/2024    FREET4 1.50 08/27/2024        Hemoglobin A1C  No components found for: \"HGBA1C\"     Microalbumin/Creatinine  No results found for: \"MALBCRERATI\"        Assessment / Plan      Assessment & Plan:  Diagnoses and all orders for this visit:    1. Elevated random blood glucose level (Primary)  Assessment & Plan:  Patient with perceived hypoglycemia without documentation of low blood glucose  Patient did have a random glucose greater than 200 during hospitalization 2/2025  A1c is still in normal range, not even prediabetic at 5.5 today  Advised to start eating protein every 2-3 hours and never eating a carbohydrate without a protein source  Eat nuts, cheese, avocado, peanut butter, meat stick between meals  Patient to decrease the amount of carbohydrates she is ingesting, cut fruit in half, only have 1 serving of bread/potatoes/corn with a " protein/meal, avoiding juices sodas sweet tea  Patient advised to only drink 1 glass of red wine and she should not do this before bed she should do this with her dinner meal, pairing it with protein  Patient is also going to continue to exercise, but is going to eat a protein snacks prior to exercise  Trial of acarbose to see if delayed carbohydrate absorption helps prevent hypoglycemia  Glucometer prescribed and patient to test blood sugar if she feels shaky, weak and she can also check first thing in the morning fasting    Orders:  -     POC Glucose, Blood  -     POC Glycosylated Hemoglobin (Hb A1C)  -     acarbose (PRECOSE) 25 MG tablet; Take 1 tablet by mouth 3 (Three) Times a Day With Meals.  Dispense: 90 tablet; Refill: 1  -     Blood Glucose Monitoring Suppl w/Device kit; Use 1 each 2 (Two) Times a Day. Ok to change to formulary coverage  Dispense: 1 each; Refill: 0  -     Lancets Misc. kit; Use 1 each Daily.  Dispense: 90 each; Refill: 3  -     glucose blood (FREESTYLE LITE) test strip; Use 2 times daily as directed ok to change to formulary coverage  Dispense: 200 each; Refill: 3       Current Outpatient Medications   Medication Instructions    acarbose (PRECOSE) 25 mg, Oral, 3 Times Daily With Meals    atorvastatin (LIPITOR) 10 mg, Oral, Every Night at Bedtime    Blood Glucose Monitoring Suppl w/Device kit 1 each, Not Applicable, 2 Times Daily, Ok to change to formulary coverage    cetirizine (ZYRTEC) 5 mg, Oral, Every Night at Bedtime    cholecalciferol (VITAMIN D3) 10,000 Units, Daily    clindamycin (CLEOCIN) 300 mg, Oral, 4 Times Daily    Continuous Glucose Sensor (Dexcom G7 Sensor) misc 1 Device, Not Applicable, Every 10 Days    donepezil (ARICEPT) 10 mg, Nightly    fluticasone (FLONASE) 50 MCG/ACT nasal spray ADMINISTER 2 SPRAYS IN EACH NOSTRIL EVERY DAY    glucose blood (FREESTYLE LITE) test strip Use 2 times daily as directed ok to change to formulary coverage    ibuprofen (ADVIL,MOTRIN) 600 mg,  Oral, Every 6 Hours PRN    ketoconazole (NIZORAL) 2 % shampoo As Needed    Lancets Misc. kit 1 each, Not Applicable, Daily    levothyroxine (SYNTHROID, LEVOTHROID) 50 mcg, Oral, Daily    lisinopril-hydrochlorothiazide (PRINZIDE,ZESTORETIC) 20-12.5 MG per tablet 1 tablet, Oral, Daily    multivitamin with minerals tablet tablet 1 tablet, Daily    mupirocin (BACTROBAN) 2 % ointment 1 Application, Topical, 3 Times Daily    ondansetron ODT (ZOFRAN-ODT) 4 mg, Translingual, 4 Times Daily PRN    Polyethylene Glycol 3350 (MIRALAX PO) Take  by mouth.    psyllium (KONSYL) 28.3 % pack pack 1 packet, Daily      No follow-ups on file.  Spent 31 minutes with patient reviewing records prior to visit, obtaining patient history, discussing treatment, discussing dietary changes with patient and     Electronically signed by: Henrry Wiseman PA-C  05/22/2025

## 2025-05-22 NOTE — ASSESSMENT & PLAN NOTE
Patient with perceived hypoglycemia without documentation of low blood glucose  Patient did have a random glucose greater than 200 during hospitalization 2/2025  A1c is still in normal range, not even prediabetic at 5.5 today  Advised to start eating protein every 2-3 hours and never eating a carbohydrate without a protein source  Eat nuts, cheese, avocado, peanut butter, meat stick between meals  Patient to decrease the amount of carbohydrates she is ingesting, cut fruit in half, only have 1 serving of bread/potatoes/corn with a protein/meal, avoiding juices sodas sweet tea  Patient advised to only drink 1 glass of red wine and she should not do this before bed she should do this with her dinner meal, pairing it with protein  Patient is also going to continue to exercise, but is going to eat a protein snacks prior to exercise  Trial of acarbose to see if delayed carbohydrate absorption helps prevent hypoglycemia  Glucometer prescribed and patient to test blood sugar if she feels shaky, weak and she can also check first thing in the morning fasting

## 2025-06-05 DIAGNOSIS — E03.9 ACQUIRED HYPOTHYROIDISM: ICD-10-CM

## 2025-06-05 RX ORDER — LEVOTHYROXINE SODIUM 50 UG/1
50 TABLET ORAL DAILY
Qty: 30 TABLET | Refills: 2 | Status: SHIPPED | OUTPATIENT
Start: 2025-06-05

## 2025-06-13 ENCOUNTER — HOSPITAL ENCOUNTER (OUTPATIENT)
Dept: GENERAL RADIOLOGY | Facility: HOSPITAL | Age: 80
Discharge: HOME | End: 2025-06-13
Payer: MEDICARE

## 2025-06-13 ENCOUNTER — OFFICE VISIT (OUTPATIENT)
Dept: INTERNAL MEDICINE | Facility: CLINIC | Age: 80
End: 2025-06-13
Payer: MEDICARE

## 2025-06-13 ENCOUNTER — TELEPHONE (OUTPATIENT)
Dept: INTERNAL MEDICINE | Facility: CLINIC | Age: 80
End: 2025-06-13

## 2025-06-13 VITALS
HEART RATE: 88 BPM | TEMPERATURE: 97.3 F | BODY MASS INDEX: 25.58 KG/M2 | SYSTOLIC BLOOD PRESSURE: 122 MMHG | HEIGHT: 67 IN | WEIGHT: 163 LBS | DIASTOLIC BLOOD PRESSURE: 62 MMHG
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
## 2025-06-13 DIAGNOSIS — M25.561 ACUTE PAIN OF RIGHT KNEE: ICD-10-CM
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
## 2025-06-13 PROCEDURE — 73560 X-RAY EXAM OF KNEE 1 OR 2: CPT

## 2025-06-13 NOTE — TELEPHONE ENCOUNTER
"Caller: Sandy Brownlee \"Agueda\"    Relationship: Self    Best call back number: [phone]     What was the call regarding: PATIENT BELIEVES HER BLOOD SUGAR HAS BEEN LOW AND SHE WAS GIVEN A BLOOD GLUCOSE MONITOR BY DR. ALBARADO BEFORE AND WOULD LIKE TO KNOW IF HE HAS ANY MORE SHE IS ABLE TO COME IN OFFICE TO GET.           "

## 2025-06-13 NOTE — PROGRESS NOTES
Acute Office Visit      Name: Sandy Brownlee    : 1945     MRN: [id number]   Care Team: Patient Care Team:  Sylvester Roman MD as PCP - General (Internal Medicine)  Sirisha Partida MD as Consulting Physician (Dermatology)  Daniel Pascal MD as Consulting Physician (Ophthalmology)  Nayeli Bartlett MD as Consulting Physician (Endocrinology)    Chief Complaint  Fall ([place] )    Subjective     History of Present Illness  The patient is a 79-year-old female who presents for evaluation of right knee pain.    She experienced a fall approximately 2 weeks ago [place] the airport, during which she landed on the outer aspect of her right knee. Despite the initial impact, she continued with her daily activities. She reports persistent pain in the affected area, which appears to be improving but not healing as expected. She describes the pain as internal, with mild swelling present. There is no reported redness or warmth in the area. The pain does not intensify when she applies pressure to the lateral part of her knee or when she fully extends her leg. However, bending the knee exacerbates the pain. She was able to use the treadmill this morning, although with some discomfort. She has not taken any over-the-counter medications for the pain. She also reports a limp, which was pointed out by her . She expresses concern about a potential fracture. She attempted to alleviate the pain with ice initially but discontinued this approach.      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    Skin lesion of right leg 2017    precancerous    Vertigo 2004     2nd HTN       Past Surgical History:   Procedure Laterality  Date    APPENDECTOMY      COLONOSCOPY      HYSTERECTOMY  1986    SKIN CANCER EXCISION      Right arm Dr Partida       Social History     Socioeconomic History    Marital status:      Spouse name: Vince    Number of children: 2    Years of education: 16    Highest education level: Some college, no degree   Tobacco Use    Smoking status: Never    Smokeless tobacco: Never   Vaping Use    Vaping status: Never Used   Substance and Sexual Activity    Alcohol use: Yes     Alcohol/week: 2.0 standard drinks of alcohol     Types: 2 Glasses of wine per week     Comment: nightly     Drug use: Never    Sexual activity: Yes     Partners: Male     Birth control/protection: None         Current Outpatient Medications:     acarbose (PRECOSE) 25 MG tablet, Take 1 tablet by mouth 3 (Three) Times a Day With Meals., Disp: 90 tablet, Rfl: 1    atorvastatin (LIPITOR) 10 MG tablet, TAKE 1 TABLET BY MOUTH EVERY NIGHT AT BEDTIME, Disp: 90 tablet, Rfl: 1    cholecalciferol (VITAMIN D3) 250 MCG (57771 UT) capsule, Take 1 capsule by mouth Daily., Disp: , Rfl:     donepezil (ARICEPT) 10 MG tablet, Take 1 tablet by mouth Every Night., Disp: , Rfl:     ibuprofen (ADVIL,MOTRIN) 600 MG tablet, Take 1 tablet by mouth Every 6 (Six) Hours As Needed for Mild Pain., Disp: 20 tablet, Rfl: 0    levothyroxine (SYNTHROID, LEVOTHROID) 50 MCG tablet, TAKE 1 TABLET BY MOUTH DAILY, Disp: 30 tablet, Rfl: 2    lisinopril-hydrochlorothiazide (PRINZIDE,ZESTORETIC) 20-12.5 MG per tablet, Take 1 tablet by mouth Daily., Disp: 90 tablet, Rfl: 1    multivitamin with minerals tablet tablet, Take 1 tablet by mouth Daily., Disp: , Rfl:     Polyethylene Glycol 3350 (MIRALAX PO), Take  by mouth Daily., Disp: , Rfl:     psyllium (KONSYL) 28.3 % pack pack, Take 1 packet by mouth Daily., Disp: , Rfl:     Blood Glucose Monitoring Suppl w/Device kit, Use 1 each 2 (Two) Times a Day. Ok to change to formulary coverage (Patient not taking: Reported on 6/13/2025), Disp: 1 each,  "Rfl: 0    cetirizine (zyrTEC) 5 MG tablet, Take 1 tablet by mouth every night at bedtime for 60 days. (Patient not taking: Reported on 6/13/2025), Disp: 30 tablet, Rfl: 1    clindamycin (Cleocin) 300 MG capsule, Take 1 capsule by mouth 4 (Four) Times a Day. (Patient not taking: Reported on 6/13/2025), Disp: 40 capsule, Rfl: 0    Continuous Glucose Sensor (Dexcom G7 Sensor) misc, Use 1 Device Every 10 (Ten) Days. (Patient not taking: Reported on 6/13/2025), Disp: 3 each, Rfl: 2    fluticasone (FLONASE) 50 MCG/ACT nasal spray, ADMINISTER 2 SPRAYS IN EACH NOSTRIL EVERY DAY (Patient not taking: Reported on 6/13/2025), Disp: 48 g, Rfl: 0    glucose blood (FREESTYLE LITE) test strip, Use 2 times daily as directed ok to change to formulary coverage (Patient not taking: Reported on 6/13/2025), Disp: 200 each, Rfl: 3    ketoconazole (NIZORAL) 2 % shampoo, Apply  topically to the appropriate area as directed As Needed for Dandruff or Irritation. (Patient not taking: Reported on 6/13/2025), Disp: , Rfl:     Lancets Misc. kit, Use 1 each Daily. (Patient not taking: Reported on 6/13/2025), Disp: 90 each, Rfl: 3    mupirocin (BACTROBAN) 2 % ointment, Apply 1 Application topically to the appropriate area as directed 3 (Three) Times a Day. (Patient not taking: Reported on 6/13/2025), Disp: 30 g, Rfl: 0    ondansetron ODT (ZOFRAN-ODT) 4 MG disintegrating tablet, Place 1 tablet on the tongue 4 (Four) Times a Day As Needed for Nausea or Vomiting. (Patient not taking: Reported on 6/13/2025), Disp: 12 tablet, Rfl: 0    Procedures    PHQ-9 Total Score:      Objective     Vital Signs  /62 (BP Location: Left arm, Patient Position: Sitting, Cuff Size: Adult)   Pulse 88   Temp 97.3 °F (36.3 °C) (Temporal)   Ht 170.2 cm (67.01\")   Wt 73.9 kg (163 lb)   BMI 25.52 kg/m²   Estimated body mass index is 25.52 kg/m² as calculated from the following:    Height as of this encounter: 170.2 cm (67.01\").    Weight as of this encounter: 73.9 " kg (163 lb).    BMI is >= 25 and <30. (Overweight) The following options were offered after discussion;: exercise counseling/recommendations and nutrition counseling/recommendations      Physical Exam  Vitals and nursing note reviewed.   HENT:      Head: Normocephalic.   Musculoskeletal:      Right knee: Swelling present. Decreased range of motion. Tenderness present over the LCL.      Left knee: Normal.   Skin:     General: Skin is warm and dry.   Neurological:      General: No focal deficit present.      Mental Status: She is alert and oriented to person, place, and time.   Psychiatric:         Mood and Affect: Mood normal.         Behavior: Behavior normal.         Thought Content: Thought content normal.         Judgment: Judgment normal.          Assessment and Plan     Assessment/Plan:  Diagnoses and all orders for this visit:    1. Fall, initial encounter (Primary)  -     XR Knee 1 or 2 View Right; Future    2. Acute pain of right knee  -     XR Knee 1 or 2 View Right; Future         Assessment & Plan  1. Right knee pain.  - The right knee pain is likely due to [place] airport. The pain is localized to the lateral portion of the knee and is exacerbated by bending the knee.  - There is no significant swelling, redness, or warmth.  - An x-ray of the right knee will be ordered to rule out any fractures or fluid accumulation.  - She is advised to take Motrin to help decrease swelling and pain. If she prefers not to take NSAIDs orally, diclofenac gel can be used as an alternative. She is also advised to apply ice packs to the affected area at home. If the x-ray results are negative and there is no improvement in symptoms, physical therapy will be considered. If physical therapy does not alleviate the symptoms, an MRI may be ordered.    There are no Patient Instructions on file for this visit.  Plan of care reviewed with patient at the conclusion of today's visit. Education was provided regarding  diagnosis, management and any prescribed or recommended OTC medications.  Patient verbalizes understanding of and agreement with management plan.    Follow Up  Return for Next Scheduled Follow up.    Patient or patient representative verbalized consent for the use of Ambient Listening during the visit with  JOEL Aguilar for chart documentation. 6/13/2025  11:14 EDT    JOEL Aguilar

## 2025-06-30 ENCOUNTER — TELEPHONE (OUTPATIENT)
Dept: INTERNAL MEDICINE | Facility: CLINIC | Age: 80
End: 2025-06-30
Payer: MEDICARE

## 2025-06-30 DIAGNOSIS — E55.9 VITAMIN D DEFICIENCY: ICD-10-CM

## 2025-06-30 DIAGNOSIS — R79.89 ELEVATED HOMOCYSTEINE: ICD-10-CM

## 2025-06-30 DIAGNOSIS — R53.83 OTHER FATIGUE: ICD-10-CM

## 2025-06-30 DIAGNOSIS — I10 BENIGN ESSENTIAL HYPERTENSION: Primary | ICD-10-CM

## 2025-06-30 DIAGNOSIS — E78.5 HYPERLIPIDEMIA LDL GOAL <100: ICD-10-CM

## 2025-06-30 DIAGNOSIS — D64.9 ANEMIA, UNSPECIFIED TYPE: ICD-10-CM

## 2025-06-30 DIAGNOSIS — M62.81 GENERALIZED MUSCLE WEAKNESS: ICD-10-CM

## 2025-06-30 NOTE — TELEPHONE ENCOUNTER
"Caller: Sandy Brownlee \"Agueda\"    Relationship: Self    Best call back number: [phone]     What orders are you requesting (i.e. lab or imaging): LABS    In what timeframe would the patient need to come in: BEFORE NEST APPOINTMENT 7/7/25    Where will you receive your lab/imaging services: IN OFFICE    Additional notes: PATIENT IS REQUESTING A CALL BACK WHEN ORDERS ARE IN      "

## 2025-07-03 ENCOUNTER — LAB (OUTPATIENT)
Dept: LAB | Facility: HOSPITAL | Age: 80
End: 2025-07-03
Payer: MEDICARE

## 2025-07-03 DIAGNOSIS — M62.81 GENERALIZED MUSCLE WEAKNESS: ICD-10-CM

## 2025-07-03 DIAGNOSIS — E78.5 HYPERLIPIDEMIA LDL GOAL <100: ICD-10-CM

## 2025-07-03 DIAGNOSIS — R74.8 ELEVATED PANCREATIC ENZYME: ICD-10-CM

## 2025-07-03 DIAGNOSIS — D64.9 ANEMIA, UNSPECIFIED TYPE: ICD-10-CM

## 2025-07-03 DIAGNOSIS — E55.9 VITAMIN D DEFICIENCY: ICD-10-CM

## 2025-07-03 DIAGNOSIS — I10 BENIGN ESSENTIAL HYPERTENSION: ICD-10-CM

## 2025-07-03 LAB
25(OH)D3 SERPL-MCNC: 50 NG/ML (ref 30–100)
ALBUMIN SERPL-MCNC: 4.4 G/DL (ref 3.5–5.2)
ALBUMIN UR-MCNC: <1.2 MG/DL
ALBUMIN/GLOB SERPL: 1.5 G/DL
ALP SERPL-CCNC: 90 U/L (ref 39–117)
ALT SERPL W P-5'-P-CCNC: 15 U/L (ref 1–33)
ANION GAP SERPL CALCULATED.3IONS-SCNC: 11.5 MMOL/L (ref 5–15)
AST SERPL-CCNC: 24 U/L (ref 1–32)
BASOPHILS # BLD AUTO: 0.05 10*3/MM3 (ref 0–0.2)
BASOPHILS NFR BLD AUTO: 0.9 % (ref 0–1.5)
BILIRUB SERPL-MCNC: 0.5 MG/DL (ref 0–1.2)
BUN SERPL-MCNC: 15 MG/DL (ref 8–23)
BUN/CREAT SERPL: 15.8 (ref 7–25)
CALCIUM SPEC-SCNC: 9.7 MG/DL (ref 8.6–10.5)
CHLORIDE SERPL-SCNC: 101 MMOL/L (ref 98–107)
CHOLEST SERPL-MCNC: 220 MG/DL (ref 0–200)
CO2 SERPL-SCNC: 23.5 MMOL/L (ref 22–29)
CREAT SERPL-MCNC: 0.95 MG/DL (ref 0.57–1)
CREAT UR-MCNC: 99.6 MG/DL
CRP SERPL-MCNC: 0.24 MG/DL (ref 0.01–0.5)
DEPRECATED RDW RBC AUTO: 40.6 FL (ref 37–54)
EGFRCR SERPLBLD CKD-EPI 2021: 61.1 ML/MIN/1.73
EOSINOPHIL # BLD AUTO: 0.32 10*3/MM3 (ref 0–0.4)
EOSINOPHIL NFR BLD AUTO: 5.7 % (ref 0.3–6.2)
ERYTHROCYTE [DISTWIDTH] IN BLOOD BY AUTOMATED COUNT: 11.7 % (ref 12.3–15.4)
FERRITIN SERPL-MCNC: 78.8 NG/ML (ref 13–150)
FOLATE SERPL-MCNC: 13 NG/ML (ref 4.78–24.2)
GLOBULIN UR ELPH-MCNC: 2.9 GM/DL
GLUCOSE SERPL-MCNC: 89 MG/DL (ref 65–99)
HCT VFR BLD AUTO: 40.3 % (ref 34–46.6)
HCYS SERPL-MCNC: 12.2 UMOL/L (ref 0–15)
HDLC SERPL-MCNC: 74 MG/DL (ref 40–60)
HGB BLD-MCNC: 13.5 G/DL (ref 12–15.9)
IMM GRANULOCYTES # BLD AUTO: 0.01 10*3/MM3 (ref 0–0.05)
IMM GRANULOCYTES NFR BLD AUTO: 0.2 % (ref 0–0.5)
IRON 24H UR-MRATE: 111 MCG/DL (ref 37–145)
IRON SATN MFR SERPL: 24 % (ref 20–50)
LDLC SERPL CALC-MCNC: 126 MG/DL (ref 0–100)
LDLC/HDLC SERPL: 1.66 {RATIO}
LIPASE SERPL-CCNC: 49 U/L (ref 13–60)
LYMPHOCYTES # BLD AUTO: 2.09 10*3/MM3 (ref 0.7–3.1)
LYMPHOCYTES NFR BLD AUTO: 37.2 % (ref 19.6–45.3)
MAGNESIUM SERPL-MCNC: 2.2 MG/DL (ref 1.6–2.4)
MCH RBC QN AUTO: 32.1 PG (ref 26.6–33)
MCHC RBC AUTO-ENTMCNC: 33.5 G/DL (ref 31.5–35.7)
MCV RBC AUTO: 96 FL (ref 79–97)
MICROALBUMIN/CREAT UR: NORMAL MG/G{CREAT}
MONOCYTES # BLD AUTO: 0.64 10*3/MM3 (ref 0.1–0.9)
MONOCYTES NFR BLD AUTO: 11.4 % (ref 5–12)
NEUTROPHILS NFR BLD AUTO: 2.51 10*3/MM3 (ref 1.7–7)
NEUTROPHILS NFR BLD AUTO: 44.6 % (ref 42.7–76)
NRBC BLD AUTO-RTO: 0 /100 WBC (ref 0–0.2)
PLATELET # BLD AUTO: 283 10*3/MM3 (ref 140–450)
PMV BLD AUTO: 9.6 FL (ref 6–12)
POTASSIUM SERPL-SCNC: 4 MMOL/L (ref 3.5–5.2)
PROT SERPL-MCNC: 7.3 G/DL (ref 6–8.5)
RBC # BLD AUTO: 4.2 10*6/MM3 (ref 3.77–5.28)
SODIUM SERPL-SCNC: 136 MMOL/L (ref 136–145)
TIBC SERPL-MCNC: 466 MCG/DL (ref 298–536)
TRANSFERRIN SERPL-MCNC: 313 MG/DL (ref 200–360)
TRIGL SERPL-MCNC: 117 MG/DL (ref 0–150)
TSH SERPL DL<=0.05 MIU/L-ACNC: 4.11 UIU/ML (ref 0.27–4.2)
VIT B12 BLD-MCNC: 339 PG/ML (ref 211–946)
VLDLC SERPL-MCNC: 20 MG/DL (ref 5–40)
WBC NRBC COR # BLD AUTO: 5.62 10*3/MM3 (ref 3.4–10.8)

## 2025-07-03 PROCEDURE — 83540 ASSAY OF IRON: CPT

## 2025-07-03 PROCEDURE — 83735 ASSAY OF MAGNESIUM: CPT

## 2025-07-03 PROCEDURE — 86141 C-REACTIVE PROTEIN HS: CPT

## 2025-07-03 PROCEDURE — 83090 ASSAY OF HOMOCYSTEINE: CPT

## 2025-07-03 PROCEDURE — 84443 ASSAY THYROID STIM HORMONE: CPT

## 2025-07-03 PROCEDURE — 82306 VITAMIN D 25 HYDROXY: CPT

## 2025-07-03 PROCEDURE — 82607 VITAMIN B-12: CPT

## 2025-07-03 PROCEDURE — 36415 COLL VENOUS BLD VENIPUNCTURE: CPT

## 2025-07-03 PROCEDURE — 82746 ASSAY OF FOLIC ACID SERUM: CPT

## 2025-07-03 PROCEDURE — 80061 LIPID PANEL: CPT

## 2025-07-03 PROCEDURE — 85025 COMPLETE CBC W/AUTO DIFF WBC: CPT

## 2025-07-03 PROCEDURE — 82570 ASSAY OF URINE CREATININE: CPT

## 2025-07-03 PROCEDURE — 83690 ASSAY OF LIPASE: CPT

## 2025-07-03 PROCEDURE — 84466 ASSAY OF TRANSFERRIN: CPT

## 2025-07-03 PROCEDURE — 80053 COMPREHEN METABOLIC PANEL: CPT

## 2025-07-03 PROCEDURE — 82728 ASSAY OF FERRITIN: CPT

## 2025-07-03 PROCEDURE — 82043 UR ALBUMIN QUANTITATIVE: CPT

## 2025-07-03 NOTE — TELEPHONE ENCOUNTER
PT RETURNED MISSED CALL. WANTED TO LET US KNOW THAT SHE HAD HER LAB WORK COMPLETED THIS MORNING.  IF YOU NEED ANYTHING ELSE PLEASE CALL HER BACK.

## 2025-07-07 ENCOUNTER — RESULTS FOLLOW-UP (OUTPATIENT)
Dept: INTERNAL MEDICINE | Facility: CLINIC | Age: 80
End: 2025-07-07

## 2025-07-07 ENCOUNTER — LAB (OUTPATIENT)
Dept: LAB | Facility: HOSPITAL | Age: 80
End: 2025-07-07
Payer: MEDICARE

## 2025-07-07 ENCOUNTER — OFFICE VISIT (OUTPATIENT)
Dept: INTERNAL MEDICINE | Facility: CLINIC | Age: 80
End: 2025-07-07
Payer: MEDICARE

## 2025-07-07 VITALS
HEIGHT: 67 IN | SYSTOLIC BLOOD PRESSURE: 108 MMHG | DIASTOLIC BLOOD PRESSURE: 66 MMHG | BODY MASS INDEX: 25.58 KG/M2 | WEIGHT: 163 LBS | HEART RATE: 88 BPM | TEMPERATURE: 98.4 F

## 2025-07-07 DIAGNOSIS — E16.2 HYPOGLYCEMIA: ICD-10-CM

## 2025-07-07 DIAGNOSIS — N17.9 ACUTE RENAL FAILURE, UNSPECIFIED ACUTE RENAL FAILURE TYPE: ICD-10-CM

## 2025-07-07 DIAGNOSIS — R41.3 MEMORY LOSS: ICD-10-CM

## 2025-07-07 DIAGNOSIS — R73.01 IMPAIRED FASTING GLUCOSE: ICD-10-CM

## 2025-07-07 DIAGNOSIS — R79.89 ELEVATED HOMOCYSTEINE: ICD-10-CM

## 2025-07-07 DIAGNOSIS — I10 BENIGN ESSENTIAL HYPERTENSION: ICD-10-CM

## 2025-07-07 DIAGNOSIS — Z00.00 MEDICARE ANNUAL WELLNESS VISIT, SUBSEQUENT: Primary | ICD-10-CM

## 2025-07-07 PROBLEM — J96.01 ACUTE RESPIRATORY FAILURE WITH HYPOXIA: Status: RESOLVED | Noted: 2022-12-12 | Resolved: 2025-07-07

## 2025-07-07 LAB
ANION GAP SERPL CALCULATED.3IONS-SCNC: 13.6 MMOL/L (ref 5–15)
BUN SERPL-MCNC: 19 MG/DL (ref 8–23)
BUN/CREAT SERPL: 17.3 (ref 7–25)
CALCIUM SPEC-SCNC: 10.1 MG/DL (ref 8.6–10.5)
CHLORIDE SERPL-SCNC: 106 MMOL/L (ref 98–107)
CO2 SERPL-SCNC: 23.4 MMOL/L (ref 22–29)
CREAT SERPL-MCNC: 1.1 MG/DL (ref 0.57–1)
EGFRCR SERPLBLD CKD-EPI 2021: 51.2 ML/MIN/1.73
GLUCOSE SERPL-MCNC: 96 MG/DL (ref 65–99)
POTASSIUM SERPL-SCNC: 4.9 MMOL/L (ref 3.5–5.2)
SODIUM SERPL-SCNC: 143 MMOL/L (ref 136–145)

## 2025-07-07 PROCEDURE — G2211 COMPLEX E/M VISIT ADD ON: HCPCS | Performed by: INTERNAL MEDICINE

## 2025-07-07 PROCEDURE — 1126F AMNT PAIN NOTED NONE PRSNT: CPT | Performed by: INTERNAL MEDICINE

## 2025-07-07 PROCEDURE — 1170F FXNL STATUS ASSESSED: CPT | Performed by: INTERNAL MEDICINE

## 2025-07-07 PROCEDURE — 3074F SYST BP LT 130 MM HG: CPT | Performed by: INTERNAL MEDICINE

## 2025-07-07 PROCEDURE — G0439 PPPS, SUBSEQ VISIT: HCPCS | Performed by: INTERNAL MEDICINE

## 2025-07-07 PROCEDURE — 1159F MED LIST DOCD IN RCRD: CPT | Performed by: INTERNAL MEDICINE

## 2025-07-07 PROCEDURE — 99214 OFFICE O/P EST MOD 30 MIN: CPT | Performed by: INTERNAL MEDICINE

## 2025-07-07 PROCEDURE — 3078F DIAST BP <80 MM HG: CPT | Performed by: INTERNAL MEDICINE

## 2025-07-07 PROCEDURE — 1160F RVW MEDS BY RX/DR IN RCRD: CPT | Performed by: INTERNAL MEDICINE

## 2025-07-07 PROCEDURE — 80048 BASIC METABOLIC PNL TOTAL CA: CPT

## 2025-07-07 RX ORDER — L-MEFOL/A-CYST/MEB12/ALGAL OIL 6-600-2 MG
1 TABLET ORAL DAILY
Qty: 90 TABLET | Refills: 3 | Status: SHIPPED | OUTPATIENT
Start: 2025-07-07 | End: 2025-07-07 | Stop reason: SDUPTHER

## 2025-07-07 RX ORDER — RIVASTIGMINE 4.6 MG/24H
1 PATCH, EXTENDED RELEASE TRANSDERMAL DAILY
Qty: 30 PATCH | Refills: 5 | Status: SHIPPED | OUTPATIENT
Start: 2025-07-07

## 2025-07-07 RX ORDER — L-MEFOL/A-CYST/MEB12/ALGAL OIL 6-600-2 MG
1 TABLET ORAL DAILY
Qty: 90 TABLET | Refills: 3 | Status: SHIPPED | OUTPATIENT
Start: 2025-07-07

## 2025-07-07 NOTE — PROGRESS NOTES
Subjective   The ABCs of the Annual Wellness Visit  Medicare Wellness Visit      Sandy Brownlee is a 79 y.o. patient who presents for a Medicare Wellness Visit. She has acute issue of hypoglycemia and weakness and hunger and elevated homocysteine She has trouble with hypoglycemia 2 to 3 hours affter eating.     The following portions of the patient's history were reviewed and   updated as appropriate: allergies, current medications, past family history, past medical history, past social history, past surgical history, and problem list.    Compared to one year ago, the patient's physical   health is worse.  Compared to one year ago, the patient's mental   health is the same.    Recent Hospitalizations:  She was not admitted to the hospital during the last year.     Current Medical Providers:  Patient Care Team:  Sylvester Roman MD as PCP - General (Internal Medicine)  Sirisha Partida MD as Consulting Physician (Dermatology)  Daniel Pascal MD as Consulting Physician (Ophthalmology)  Nayeli Bartlett MD as Consulting Physician (Endocrinology)    Outpatient Medications Prior to Visit   Medication Sig Dispense Refill    acarbose (PRECOSE) 25 MG tablet Take 1 tablet by mouth 3 (Three) Times a Day With Meals. 90 tablet 1    atorvastatin (LIPITOR) 10 MG tablet TAKE 1 TABLET BY MOUTH EVERY NIGHT AT BEDTIME 90 tablet 1    cholecalciferol (VITAMIN D3) 250 MCG (00826 UT) capsule Take 1 capsule by mouth Daily.      ibuprofen (ADVIL,MOTRIN) 600 MG tablet Take 1 tablet by mouth Every 6 (Six) Hours As Needed for Mild Pain. 20 tablet 0    levothyroxine (SYNTHROID, LEVOTHROID) 50 MCG tablet TAKE 1 TABLET BY MOUTH DAILY 30 tablet 2    lisinopril-hydrochlorothiazide (PRINZIDE,ZESTORETIC) 20-12.5 MG per tablet Take 1 tablet by mouth Daily. 90 tablet 1    multivitamin with minerals tablet tablet Take 1 tablet by mouth Daily.      mupirocin (BACTROBAN) 2 % ointment Apply 1 Application topically to the appropriate area as  directed 3 (Three) Times a Day. 30 g 0    Polyethylene Glycol 3350 (MIRALAX PO) Take  by mouth Daily.      psyllium (KONSYL) 28.3 % pack pack Take 1 packet by mouth Daily.      donepezil (ARICEPT) 10 MG tablet Take 1 tablet by mouth Every Night.      Blood Glucose Monitoring Suppl w/Device kit Use 1 each 2 (Two) Times a Day. Ok to change to formulary coverage (Patient not taking: Reported on 7/7/2025) 1 each 0    cetirizine (zyrTEC) 5 MG tablet Take 1 tablet by mouth every night at bedtime for 60 days. (Patient not taking: Reported on 6/13/2025) 30 tablet 1    clindamycin (Cleocin) 300 MG capsule Take 1 capsule by mouth 4 (Four) Times a Day. (Patient not taking: Reported on 7/7/2025) 40 capsule 0    Continuous Glucose Sensor (Dexcom G7 Sensor) misc Use 1 Device Every 10 (Ten) Days. (Patient not taking: Reported on 7/7/2025) 3 each 2    fluticasone (FLONASE) 50 MCG/ACT nasal spray ADMINISTER 2 SPRAYS IN EACH NOSTRIL EVERY DAY (Patient not taking: Reported on 7/7/2025) 48 g 0    glucose blood (FREESTYLE LITE) test strip Use 2 times daily as directed ok to change to formulary coverage (Patient not taking: Reported on 7/7/2025) 200 each 3    ketoconazole (NIZORAL) 2 % shampoo Apply  topically to the appropriate area as directed As Needed for Dandruff or Irritation. (Patient not taking: Reported on 7/7/2025)      Lancets Misc. kit Use 1 each Daily. (Patient not taking: Reported on 7/7/2025) 90 each 3    ondansetron ODT (ZOFRAN-ODT) 4 MG disintegrating tablet Place 1 tablet on the tongue 4 (Four) Times a Day As Needed for Nausea or Vomiting. (Patient not taking: Reported on 7/7/2025) 12 tablet 0     No facility-administered medications prior to visit.     No opioid medication identified on active medication list. I have reviewed chart for other potential  high risk medication/s and harmful drug interactions in the elderly.      Aspirin is not on active medication list.  Aspirin use is not indicated based on review of  "current medical condition/s. Risk of harm outweighs potential benefits.  .    Patient Active Problem List   Diagnosis    Blister of great toe of left foot, initial encounter    Aphthous ulcer    Benign essential hypertension    Cough    Heart murmur    History of squamous cell carcinoma of skin    Hyperlipidemia LDL goal <100    Hypothyroidism    Impaired fasting glucose    Medicare annual wellness visit, subsequent    Nipple pain    Peripheral vascular disease    Vitamin D deficiency    Acute vaginitis    Dizziness    Memory loss    Pneumonia of both lower lobes due to infectious organism    Generalized muscle weakness    Elevated homocysteine    Pain of both hip joints    Other fatigue    Irritable bowel syndrome with both constipation and diarrhea    Cellulitis of right lower extremity    Wound cellulitis    Hypoglycemia     Advance Care Planning Advance Directive is on file.  ACP discussion was held with the patient during this visit. Patient has an advance directive in EMR which is still valid.             Objective   Vitals:    07/07/25 1010   BP: 108/66   Pulse: 88   Temp: 98.4 °F (36.9 °C)   Weight: 73.9 kg (163 lb)   Height: 170.2 cm (67.01\")   PainSc: 0-No pain       Estimated body mass index is 25.52 kg/m² as calculated from the following:    Height as of this encounter: 170.2 cm (67.01\").    Weight as of this encounter: 73.9 kg (163 lb).                Does the patient have evidence of cognitive impairment? Yes  Lab Results   Component Value Date    TRIG 117 07/03/2025    HDL 74 (H) 07/03/2025     (H) 07/03/2025    VLDL 20 07/03/2025    HGBA1C 5.5 05/22/2025                                                                                                Health  Risk Assessment    Smoking Status:  Social History     Tobacco Use   Smoking Status Never   Smokeless Tobacco Never     Alcohol Consumption:  Social History     Substance and Sexual Activity   Alcohol Use Yes    Alcohol/week: 2.0 standard " drinks of alcohol    Types: 2 Glasses of wine per week    Comment: nightly        Fall Risk Screen  FARHEEN Fall Risk Assessment was completed, and patient is at HIGH risk for falls. Assessment completed on:2025    Depression Screening   Little interest or pleasure in doing things? Not at all   Feeling down, depressed, or hopeless? Not at all   PHQ-2 Total Score 0      Health Habits and Functional and Cognitive Screenin/7/2025    10:11 AM   Functional & Cognitive Status   Do you have difficulty preparing food and eating? No   Do you have difficulty bathing yourself, getting dressed or grooming yourself? No   Do you have difficulty using the toilet? No   Do you have difficulty moving around from place to place? No   Do you have trouble with steps or getting out of a bed or a chair? No   Current Diet Well Balanced Diet   Dental Exam Up to date   Eye Exam Up to date   Exercise (times per week) 0 times per week   Current Exercises Include No Regular Exercise   Do you need help using the phone?  No   Are you deaf or do you have serious difficulty hearing?  No   Do you need help to go to places out of walking distance? No   Do you need help shopping? No   Do you need help preparing meals?  No   Do you need help with housework?  No   Do you need help with laundry? No   Do you need help taking your medications? No   Do you need help managing money? No   Do you ever drive or ride in a car without wearing a seat belt? No   Have you felt unusual fatigue (could be tiredness), stress, anger or loneliness in the last month? No   Who do you live with? Spouse   If you need help, do you have trouble finding someone available to you? No   Have you been bothered in the last four weeks by sexual problems? No   Do you have difficulty concentrating, remembering or making decisions? Yes           Age-appropriate Screening Schedule:  Refer to the list below for future screening recommendations based on patient's age, sex and/or  medical conditions. Orders for these recommended tests are listed in the plan section. The patient has been provided with a written plan.    Health Maintenance List  Health Maintenance   Topic Date Due    TDAP/TD VACCINES (2 - Td or Tdap) 03/22/2021    COVID-19 Vaccine (6 - 2024-25 season) 03/10/2025    DXA SCAN  04/11/2025    INFLUENZA VACCINE  10/01/2025    LIPID PANEL  07/03/2026    ANNUAL WELLNESS VISIT  07/07/2026    HEPATITIS C SCREENING  Completed    RSV Vaccine - Adults  Completed    Pneumococcal Vaccine 50+  Completed    ZOSTER VACCINE  Completed    MAMMOGRAM  Discontinued    COLORECTAL CANCER SCREENING  Discontinued                                                                                                                                                CMS Preventative Services Quick Reference  Risk Factors Identified During Encounter  Polypharmacy: Medication List reviewed    The above risks/problems have been discussed with the patient.  Pertinent information has been shared with the patient in the After Visit Summary.  An After Visit Summary and PPPS were made available to the patient.    Follow Up:   Next Medicare Wellness visit to be scheduled in 1 year.         Additional E&M Note during same encounter follows:  Patient has additional, significant, and separately identifiable condition(s)/problem(s) that require work above and beyond the Medicare Wellness Visit     Chief Complaint  Hyperlipidemia, Medicare Wellness-subsequent, and Hypoglycemia    Subjective   HPI  Agueda is also being seen today for additional medical problem/s.    Review of Systems   Constitutional:  Positive for fatigue.        Feeling of hunger    HENT:  Negative for hearing loss.    Eyes:  Negative for visual disturbance.   Respiratory:  Negative for cough and shortness of breath.    Cardiovascular:  Negative for chest pain and palpitations.   Gastrointestinal:  Negative for abdominal pain, blood in stool, constipation and  "diarrhea.   Musculoskeletal:  Negative for gait problem.   Psychiatric/Behavioral:  Positive for decreased concentration.               Objective   Vital Signs:  /66   Pulse 88   Temp 98.4 °F (36.9 °C)   Ht 170.2 cm (67.01\")   Wt 73.9 kg (163 lb)   BMI 25.52 kg/m²   Physical Exam  Constitutional:       Appearance: Normal appearance.   HENT:      Ears:      Comments: Partial cerumen impaction bilaterally   Eyes:      Conjunctiva/sclera: Conjunctivae normal.   Neck:      Vascular: No carotid bruit.   Cardiovascular:      Rate and Rhythm: Normal rate and regular rhythm.   Pulmonary:      Breath sounds: No wheezing or rales.   Abdominal:      General: Bowel sounds are normal.      Palpations: Abdomen is soft.      Tenderness: There is no abdominal tenderness.   Neurological:      Mental Status: She is alert.      Comments: Good get up and go and her clock hands were ok but without any numbers and her MMSE was 25/30   Psychiatric:         Mood and Affect: Mood normal.         Behavior: Behavior normal.                    Assessment and Plan      Medicare annual wellness visit, subsequent  Her mood is ok and her get up and go is good. She has memory loss and will add cerefolin NAC and exelon patch and see Dr Nance. She will see Dr Partida for skin survey this month. Age-appropriate Counseling:  Discussed preventative medicine issues with patient including regular exercise, healthy diet, stress reduction, adequate sleep and recommended age-appropriate screening studies.  Immunizations reviewed.             Benign essential hypertension  Hypertension is stable and controlled  Continue current treatment regimen.  Weight loss.  Regular aerobic exercise.  Blood pressure will be reassessed 4 months .    Orders:    Basic Metabolic Panel; Future    Elevated homocysteine  Acute issue and add Cerefolin NAC to help memory and energy and reduce risk of heart disease and stroke.     Orders:    Methylfol-Algae-N77-Wxkcxiwgag " (Cerefolin NAC) 6-90.314-2-600 MG tablet; Take 1 tablet by mouth Daily.    Impaired fasting glucose  See hypoglycemia and follow with Endo.   Hypoglycemia  She has  perceived hypoglycemia without documentation of low blood glucose and acute labs today did not confirm low blood sugar.   She did have a random glucose greater than 200 during hospitalization 2/2025  A1c is still in normal range, not even prediabetic at 5.5 today  Advised to start eating protein every 2-3 hours and never eating a carbohydrate without a protein source  Eat nuts, cheese, avocado, peanut butter, meat stick between meals  She is to decrease the amount of carbohydrates she is ingesting, cut fruit in half, only have 1 serving of bread/potatoes/corn with a protein/meal, avoiding juices sodas sweet tea  She is  advised to drink ideally zero to only drink 1 glass of red wine and she should not do this before bed she should do this with her dinner meal, pairing it with protein  She has  also going to continue to exercise, but is going to eat a protein snacks prior to exercise  Trial of acarbose to see if delayed carbohydrate absorption     Orders:    Basic Metabolic Panel; Future    Memory loss  See Dr Nance and her MMSE 25/30 and add cerefoin NAC and exelon patch as intolerant of aricept     Orders:    rivastigmine (Exelon) 4.6 MG/24HR patch; Place 1 patch on the skin as directed by provider Daily.    Methylfol-Algae-Z59-Zpiqjtesso (Cerefolin NAC) 6-90.314-2-600 MG tablet; Take 1 tablet by mouth Daily.    Ambulatory Referral to Neurology    Ambulatory Referral to Neurology    Acute renal failure, unspecified acute renal failure type   Acute issue and Discussed importance of good BG and BP control; avoid NSAIDs,such as ibuprofen (Advil, Motrin) or naproxen (Aleve).                  Follow Up   Return in about 4 months (around 11/11/2025) for Recheck, Labs this visit.  Patient was given instructions and counseling regarding her condition or for  health maintenance advice. Please see specific information pulled into the AVS if appropriate.

## 2025-07-08 NOTE — ASSESSMENT & PLAN NOTE
See Dr Nance and her MMSE 25/30 and add cerefoin NAC and exelon patch as intolerant of aricept     Orders:    rivastigmine (Exelon) 4.6 MG/24HR patch; Place 1 patch on the skin as directed by provider Daily.    Methylfol-Algae-Q36-Mrguoegeqs (Cerefolin NAC) 6-90.314-2-600 MG tablet; Take 1 tablet by mouth Daily.    Ambulatory Referral to Neurology    Ambulatory Referral to Neurology

## 2025-07-08 NOTE — ASSESSMENT & PLAN NOTE
Her mood is ok and her get up and go is good. She has memory loss and will add cerefolin NAC and exelon patch and see Dr Nance. She will see Dr Partida for skin survey this month. Age-appropriate Counseling:  Discussed preventative medicine issues with patient including regular exercise, healthy diet, stress reduction, adequate sleep and recommended age-appropriate screening studies.  Immunizations reviewed.

## 2025-07-08 NOTE — ASSESSMENT & PLAN NOTE
Hypertension is stable and controlled  Continue current treatment regimen.  Weight loss.  Regular aerobic exercise.  Blood pressure will be reassessed 4 months .    Orders:    Basic Metabolic Panel; Future

## 2025-07-08 NOTE — ASSESSMENT & PLAN NOTE
She has  perceived hypoglycemia without documentation of low blood glucose and acute labs today did not confirm low blood sugar.   She did have a random glucose greater than 200 during hospitalization 2/2025  A1c is still in normal range, not even prediabetic at 5.5 today  Advised to start eating protein every 2-3 hours and never eating a carbohydrate without a protein source  Eat nuts, cheese, avocado, peanut butter, meat stick between meals  She is to decrease the amount of carbohydrates she is ingesting, cut fruit in half, only have 1 serving of bread/potatoes/corn with a protein/meal, avoiding juices sodas sweet tea  She is  advised to drink ideally zero to only drink 1 glass of red wine and she should not do this before bed she should do this with her dinner meal, pairing it with protein  She has  also going to continue to exercise, but is going to eat a protein snacks prior to exercise  Trial of acarbose to see if delayed carbohydrate absorption     Orders:    Basic Metabolic Panel; Future

## 2025-07-08 NOTE — ASSESSMENT & PLAN NOTE
Acute issue and add Cerefolin NAC to help memory and energy and reduce risk of heart disease and stroke.     Orders:    Methylfol-Algae-M97-Tuuwpyogwc (Cerefolin NAC) 6-90.314-2-600 MG tablet; Take 1 tablet by mouth Daily.

## 2025-07-21 DIAGNOSIS — E03.9 ACQUIRED HYPOTHYROIDISM: ICD-10-CM

## 2025-07-21 RX ORDER — LEVOTHYROXINE SODIUM 50 UG/1
50 TABLET ORAL DAILY
Qty: 90 TABLET | Refills: 3 | Status: SHIPPED | OUTPATIENT
Start: 2025-07-21

## 2025-07-23 DIAGNOSIS — R73.9 ELEVATED RANDOM BLOOD GLUCOSE LEVEL: ICD-10-CM

## 2025-07-24 ENCOUNTER — TELEPHONE (OUTPATIENT)
Age: 80
End: 2025-07-24

## 2025-07-24 RX ORDER — ACARBOSE 25 MG/1
25 TABLET ORAL
Qty: 90 TABLET | Refills: 0 | Status: SHIPPED | OUTPATIENT
Start: 2025-07-24

## 2025-07-24 NOTE — TELEPHONE ENCOUNTER
"        Hub staff attempted to follow warm transfer process and was unsuccessful     Caller: Sandy Brownlee \"Agueda\"    Relationship to patient: Self    Best call back number: [phone]    Patient is needing: PT HAS AN APPT TODAY AT 11:30  BUT HAS AN OVERLAPPING APPT. ASKED IF SHE COULD COME IN LATER TODAY. PLEASE CALL PT BACK ON A LATER APPT FOR TODAY.  ASKED FOR AN APPT  AFTER 11:30 AM          "

## 2025-07-24 NOTE — TELEPHONE ENCOUNTER
Rx Refill Note  Requested Prescriptions     Pending Prescriptions Disp Refills    acarbose (PRECOSE) 25 MG tablet [Pharmacy Med Name: ACARBOSE 25MG TABLETS] 90 tablet 1     Sig: TAKE 1 TABLET BY MOUTH THREE TIMES DAILY WITH MEALS      Last office visit with prescribing clinician: 5/22/2025     Next office visit with prescribing clinician: 8/14/2025

## 2025-07-24 NOTE — TELEPHONE ENCOUNTER
Returned patients call. No answer, left detailed voicemail.    Gave her my number to call back. We really don't have anything much later, right now we could get her appointment moved to 1PM. If that doesn't work we would have to rescheduled.

## 2025-07-28 ENCOUNTER — OFFICE VISIT (OUTPATIENT)
Dept: INTERNAL MEDICINE | Facility: CLINIC | Age: 80
End: 2025-07-28
Payer: MEDICARE

## 2025-07-28 VITALS
HEART RATE: 82 BPM | BODY MASS INDEX: 25.58 KG/M2 | WEIGHT: 163 LBS | DIASTOLIC BLOOD PRESSURE: 52 MMHG | SYSTOLIC BLOOD PRESSURE: 110 MMHG | TEMPERATURE: 98.2 F | HEIGHT: 67 IN

## 2025-07-28 DIAGNOSIS — R79.89 ELEVATED HOMOCYSTEINE: ICD-10-CM

## 2025-07-28 DIAGNOSIS — R53.83 OTHER FATIGUE: Primary | ICD-10-CM

## 2025-07-28 DIAGNOSIS — R41.3 MEMORY LOSS: ICD-10-CM

## 2025-07-28 DIAGNOSIS — R73.01 IMPAIRED FASTING GLUCOSE: ICD-10-CM

## 2025-07-28 PROCEDURE — 1160F RVW MEDS BY RX/DR IN RCRD: CPT | Performed by: INTERNAL MEDICINE

## 2025-07-28 PROCEDURE — 99214 OFFICE O/P EST MOD 30 MIN: CPT | Performed by: INTERNAL MEDICINE

## 2025-07-28 PROCEDURE — 1126F AMNT PAIN NOTED NONE PRSNT: CPT | Performed by: INTERNAL MEDICINE

## 2025-07-28 PROCEDURE — 3074F SYST BP LT 130 MM HG: CPT | Performed by: INTERNAL MEDICINE

## 2025-07-28 PROCEDURE — 3078F DIAST BP <80 MM HG: CPT | Performed by: INTERNAL MEDICINE

## 2025-07-28 PROCEDURE — 1159F MED LIST DOCD IN RCRD: CPT | Performed by: INTERNAL MEDICINE

## 2025-07-28 PROCEDURE — G2211 COMPLEX E/M VISIT ADD ON: HCPCS | Performed by: INTERNAL MEDICINE

## 2025-07-28 RX ORDER — L-MEFOL/A-CYST/MEB12/ALGAL OIL 6-600-2 MG
1 TABLET ORAL DAILY
Qty: 90 TABLET | Refills: 3 | Status: SHIPPED | OUTPATIENT
Start: 2025-07-28

## 2025-07-28 RX ORDER — RIVASTIGMINE 4.6 MG/24H
1 PATCH, EXTENDED RELEASE TRANSDERMAL DAILY
Qty: 30 PATCH | Refills: 5 | Status: SHIPPED | OUTPATIENT
Start: 2025-07-28

## 2025-07-28 NOTE — ASSESSMENT & PLAN NOTE
Recent labs did not confirm low blood sugar.   Advised to start eating protein every 2-3 hours and never eating a carbohydrate without a protein source  Eat nuts, cheese, avocado, peanut butter, meat stick between meals  Advised to decrease the amount of carbohydrates she is ingesting, cut fruit in half, only have 1 serving of bread/potatoes/corn with a protein/meal, avoiding juices sodas sweet tea  Advise to drink ideally zero to only drink 1 glass of red wine and she should not do this before bed she should do this with her dinner meal, pairing it with protein  Also advise  to continue to exercise, but is going to eat a protein snacks prior to exercise

## 2025-07-28 NOTE — PATIENT INSTRUCTIONS
Problem List Items Addressed This Visit          Endocrine and Metabolic    Impaired fasting glucose    Current Assessment & Plan   Recent labs did not confirm low blood sugar.   Advised to start eating protein every 2-3 hours and never eating a carbohydrate without a protein source  Eat nuts, cheese, avocado, peanut butter, meat stick between meals  Advised to decrease the amount of carbohydrates she is ingesting, cut fruit in half, only have 1 serving of bread/potatoes/corn with a protein/meal, avoiding juices sodas sweet tea  Advise to drink ideally zero to only drink 1 glass of red wine and she should not do this before bed she should do this with her dinner meal, pairing it with protein  Also advise  to continue to exercise, but is going to eat a protein snacks prior to exercise         Relevant Medications    acarbose (PRECOSE) 25 MG tablet       Neuro    Memory loss    Overview   See Dr Nance and her MMSE 25/30         Current Assessment & Plan   Add the Cerefolin from [employer] and Avoid the heat and stay hydrated and stop the donepezil and hold on starting the rivastigmine (patch) for 1 to 2 weeks until you feel better          Relevant Medications    Methylfol-Algae-S59-Rdxcaxtntx (Cerefolin NAC) 6-90.314-2-600 MG tablet    rivastigmine (Exelon) 4.6 MG/24HR patch       Symptoms and Signs    Elevated homocysteine    Relevant Medications    Methylfol-Algae-Z23-Yufehzjeog (Cerefolin NAC) 6-90.314-2-600 MG tablet    Other fatigue - Primary    Current Assessment & Plan   Avoid the heat and stay hydrated and stop the donepezil and hold on starting the rivastigmine (patch) for 1 to 2 weeks until you feel better

## 2025-07-28 NOTE — ASSESSMENT & PLAN NOTE
Add the Cerefolin from [employer] and Avoid the heat and stay hydrated and stop the donepezil and hold on starting the rivastigmine (patch) for 1 to 2 weeks until you feel better

## 2025-07-28 NOTE — ASSESSMENT & PLAN NOTE
Avoid the heat and stay hydrated and stop the donepezil and hold on starting the rivastigmine (patch) for 1 to 2 weeks until you feel better

## 2025-07-28 NOTE — PROGRESS NOTES
Chief Complaint   Patient presents with    Fatigue    Blood Sugar Problem       History of Present Illness  79 y.o. female presents for evaluation of fatigue after extended time outside in heat. She has had some confusion.  She has had some blood sugar spikes and then drop in blood sugar.     Review of Systems   Constitutional:  Positive for fatigue.   Cardiovascular:  Negative for chest pain.   Gastrointestinal:  Negative for abdominal pain.   Neurological:  Positive for weakness.   Psychiatric/Behavioral:  Positive for decreased concentration.      .    PMSFH:  The following portions of the patient's history were reviewed and updated as appropriate: allergies, current medications, past family history, past medical history, past social history, past surgical history and problem list.      Current Outpatient Medications:     acarbose (PRECOSE) 25 MG tablet, TAKE 1 TABLET BY MOUTH THREE TIMES DAILY WITH MEALS, Disp: 90 tablet, Rfl: 0    atorvastatin (LIPITOR) 10 MG tablet, TAKE 1 TABLET BY MOUTH EVERY NIGHT AT BEDTIME, Disp: 90 tablet, Rfl: 1    cholecalciferol (VITAMIN D3) 250 MCG (87283 UT) capsule, Take 1 capsule by mouth Daily., Disp: , Rfl:     ibuprofen (ADVIL,MOTRIN) 600 MG tablet, Take 1 tablet by mouth Every 6 (Six) Hours As Needed for Mild Pain., Disp: 20 tablet, Rfl: 0    levothyroxine (SYNTHROID, LEVOTHROID) 50 MCG tablet, TAKE 1 TABLET BY MOUTH DAILY, Disp: 90 tablet, Rfl: 3    lisinopril-hydrochlorothiazide (PRINZIDE,ZESTORETIC) 20-12.5 MG per tablet, Take 1 tablet by mouth Daily., Disp: 90 tablet, Rfl: 1    Methylfol-Algae-E99-Dumkxehzvj (Cerefolin NAC) 6-90.314-2-600 MG tablet, Take 1 tablet by mouth Daily., Disp: 90 tablet, Rfl: 3    multivitamin with minerals tablet tablet, Take 1 tablet by mouth Daily., Disp: , Rfl:     mupirocin (BACTROBAN) 2 % ointment, Apply 1 Application topically to the appropriate area as directed 3 (Three) Times a Day., Disp: 30 g, Rfl: 0    Polyethylene Glycol 3350 (MIRALAX  "PO), Take  by mouth Daily., Disp: , Rfl:     psyllium (KONSYL) 28.3 % pack pack, Take 1 packet by mouth Daily., Disp: , Rfl:     rivastigmine (Exelon) 4.6 MG/24HR patch, Place 1 patch on the skin as directed by provider Daily., Disp: 30 patch, Rfl: 5    VITALS:  /52   Pulse 82   Temp 98.2 °F (36.8 °C)   Ht 170.2 cm (67.01\")   Wt 73.9 kg (163 lb)   BMI 25.52 kg/m²     Physical Exam  Cardiovascular:      Rate and Rhythm: Normal rate and regular rhythm.   Pulmonary:      Effort: Pulmonary effort is normal. No respiratory distress.   Abdominal:      General: Bowel sounds are normal.      Palpations: Abdomen is soft.      Tenderness: There is no abdominal tenderness.   Neurological:      General: No focal deficit present.      Mental Status: She is oriented to person, place, and time.   Psychiatric:         Mood and Affect: Mood normal.         LABS:    CMP:  Lab Results   Component Value Date    BUN 19.0 07/07/2025    CREATININE 1.10 (H) 07/07/2025    EGFRIFNONA 78 12/14/2021     07/07/2025    K 4.9 07/07/2025     07/07/2025    CALCIUM 10.1 07/07/2025    ALBUMIN 4.4 07/03/2025    BILITOT 0.5 07/03/2025    ALKPHOS 90 07/03/2025    AST 24 07/03/2025    ALT 15 07/03/2025     CBC w/ diff:   Lab Results   Component Value Date    WBC 5.62 07/03/2025    RBC 4.20 07/03/2025    HGB 13.5 07/03/2025    HCT 40.3 07/03/2025    MCV 96.0 07/03/2025    MCH 32.1 07/03/2025    MCHC 33.5 07/03/2025    RDW 11.7 (L) 07/03/2025     07/03/2025    NEUTRORELPCT 44.6 07/03/2025    AUTOIGPER 0.2 07/03/2025    LYMPHORELPCT 37.2 07/03/2025    MONORELPCT 11.4 07/03/2025    EOSRELPCT 5.7 07/03/2025    BASORELPCT 0.9 07/03/2025     LIPID PANEL:  Lab Results   Component Value Date    TRIG 117 07/03/2025    HDL 74 (H) 07/03/2025    VLDL 20 07/03/2025     (H) 07/03/2025    LDLHDL 1.66 07/03/2025     HGBA1C (LAST 3):  Lab Results   Component Value Date    HGBA1C 5.5 05/22/2025    HGBA1C 5.30 05/10/2024    HGBA1C 5.30 " 01/05/2024     MICROALBUMIN SPOT URINE:  Lab Results   Component Value Date    MICROALBUR <1.2 07/03/2025     Procedures         ASSESSMENT/PLAN:  Diagnoses and all orders for this visit:    1. Other fatigue (Primary)  Assessment & Plan:  Avoid the heat and stay hydrated and stop the donepezil and hold on starting the rivastigmine (patch) for 1 to 2 weeks until you feel better       2. Elevated homocysteine  Assessment & Plan:  Resend the cerefolin Nac     Orders:  -     Methylfol-Algae-K04-Flalvjiisg (Cerefolin NAC) 6-90.314-2-600 MG tablet; Take 1 tablet by mouth Daily.  Dispense: 90 tablet; Refill: 3    3. Memory loss  Assessment & Plan:  Add the Cerefolin from [employer] and Avoid the heat and stay hydrated and stop the donepezil and hold on starting the rivastigmine (patch) for 1 to 2 weeks until you feel better     Orders:  -     Methylfol-Algae-N88-Qdiiafkrum (Cerefolin NAC) 6-90.314-2-600 MG tablet; Take 1 tablet by mouth Daily.  Dispense: 90 tablet; Refill: 3  -     rivastigmine (Exelon) 4.6 MG/24HR patch; Place 1 patch on the skin as directed by provider Daily.  Dispense: 30 patch; Refill: 5    4. Impaired fasting glucose  Assessment & Plan:  Recent labs did not confirm low blood sugar.   Advised to start eating protein every 2-3 hours and never eating a carbohydrate without a protein source  Eat nuts, cheese, avocado, peanut butter, meat stick between meals  Advised to decrease the amount of carbohydrates she is ingesting, cut fruit in half, only have 1 serving of bread/potatoes/corn with a protein/meal, avoiding juices sodas sweet tea  Advise to drink ideally zero to only drink 1 glass of red wine and she should not do this before bed she should do this with her dinner meal, pairing it with protein  Also advise  to continue to exercise, but is going to eat a protein snacks prior to exercise          FOLLOW-UP:  Return for Next scheduled follow up.      Electronically signed by:    Sylvester Roman  MD

## 2025-08-01 RX ORDER — LISINOPRIL AND HYDROCHLOROTHIAZIDE 12.5; 2 MG/1; MG/1
1 TABLET ORAL DAILY
Qty: 90 TABLET | Refills: 1 | Status: SHIPPED | OUTPATIENT
Start: 2025-08-01

## 2025-08-01 NOTE — TELEPHONE ENCOUNTER
Rx Refill Note  Requested Prescriptions     Pending Prescriptions Disp Refills    lisinopril-hydrochlorothiazide (PRINZIDE,ZESTORETIC) 20-12.5 MG per tablet [Pharmacy Med Name: LISINOPRIL-HCTZ 20/12.5MG TABLETS] 90 tablet 1     Sig: TAKE 1 TABLET BY MOUTH DAILY      Last office visit with prescribing clinician: 7/28/2025   Last telemedicine visit with prescribing clinician: Visit date not found   Next office visit with prescribing clinician: 11/7/2025                         Would you like a call back once the refill request has been completed: [] Yes [] No    If the office needs to give you a call back, can they leave a voicemail: [] Yes [] No    Laly Uribe MA  08/01/25, 14:11 EDT

## 2025-08-02 DIAGNOSIS — E03.9 ACQUIRED HYPOTHYROIDISM: ICD-10-CM

## 2025-08-04 RX ORDER — LEVOTHYROXINE SODIUM 50 UG/1
50 TABLET ORAL DAILY
Qty: 90 TABLET | Refills: 3 | OUTPATIENT
Start: 2025-08-04

## 2025-08-05 RX ORDER — ATORVASTATIN CALCIUM 10 MG/1
10 TABLET, FILM COATED ORAL
Qty: 90 TABLET | Refills: 1 | Status: SHIPPED | OUTPATIENT
Start: 2025-08-05

## 2025-08-19 ENCOUNTER — OFFICE VISIT (OUTPATIENT)
Dept: INTERNAL MEDICINE | Facility: CLINIC | Age: 80
End: 2025-08-19
Payer: MEDICARE

## 2025-08-19 VITALS
TEMPERATURE: 97 F | BODY MASS INDEX: 25.52 KG/M2 | WEIGHT: 163 LBS | HEART RATE: 87 BPM | DIASTOLIC BLOOD PRESSURE: 70 MMHG | SYSTOLIC BLOOD PRESSURE: 110 MMHG | OXYGEN SATURATION: 96 %

## 2025-08-19 DIAGNOSIS — S81.802A WOUND OF LEFT LOWER EXTREMITY, INITIAL ENCOUNTER: ICD-10-CM

## 2025-08-19 DIAGNOSIS — R05.1 ACUTE COUGH: Primary | ICD-10-CM

## 2025-08-19 PROCEDURE — 3074F SYST BP LT 130 MM HG: CPT

## 2025-08-19 PROCEDURE — 99213 OFFICE O/P EST LOW 20 MIN: CPT

## 2025-08-19 PROCEDURE — 1126F AMNT PAIN NOTED NONE PRSNT: CPT

## 2025-08-19 PROCEDURE — 3078F DIAST BP <80 MM HG: CPT

## 2025-08-19 PROCEDURE — 87428 SARSCOV & INF VIR A&B AG IA: CPT
